# Patient Record
Sex: FEMALE | Race: WHITE | Employment: OTHER | URBAN - METROPOLITAN AREA
[De-identification: names, ages, dates, MRNs, and addresses within clinical notes are randomized per-mention and may not be internally consistent; named-entity substitution may affect disease eponyms.]

---

## 2017-01-04 ENCOUNTER — OFFICE VISIT (OUTPATIENT)
Dept: INTERNAL MEDICINE CLINIC | Age: 62
End: 2017-01-04

## 2017-01-04 VITALS
RESPIRATION RATE: 14 BRPM | HEIGHT: 64 IN | SYSTOLIC BLOOD PRESSURE: 138 MMHG | TEMPERATURE: 98.7 F | HEART RATE: 82 BPM | WEIGHT: 204.4 LBS | OXYGEN SATURATION: 98 % | BODY MASS INDEX: 34.89 KG/M2 | DIASTOLIC BLOOD PRESSURE: 68 MMHG

## 2017-01-04 DIAGNOSIS — Z79.4 TYPE 2 DIABETES MELLITUS WITHOUT COMPLICATION, WITH LONG-TERM CURRENT USE OF INSULIN (HCC): ICD-10-CM

## 2017-01-04 DIAGNOSIS — J18.9 PNEUMONIA, UNSPECIFIED ORGANISM: Primary | ICD-10-CM

## 2017-01-04 DIAGNOSIS — E11.9 TYPE 2 DIABETES MELLITUS WITHOUT COMPLICATION, WITH LONG-TERM CURRENT USE OF INSULIN (HCC): ICD-10-CM

## 2017-01-04 RX ORDER — FLUTICASONE PROPIONATE 44 UG/1
AEROSOL, METERED RESPIRATORY (INHALATION)
COMMUNITY
Start: 2016-12-29 | End: 2018-06-22

## 2017-01-04 RX ORDER — ALBUTEROL SULFATE 90 UG/1
AEROSOL, METERED RESPIRATORY (INHALATION)
COMMUNITY
Start: 2016-12-29 | End: 2018-06-22

## 2017-01-04 RX ORDER — BENZONATATE 200 MG/1
200 CAPSULE ORAL
Qty: 30 CAP | Refills: 0 | Status: SHIPPED | OUTPATIENT
Start: 2017-01-04 | End: 2017-02-01

## 2017-01-04 RX ORDER — HYDROCODONE POLISTIREX AND CHLORPHENIRAMINE POLISTIREX 10; 8 MG/5ML; MG/5ML
1 SUSPENSION, EXTENDED RELEASE ORAL
Qty: 100 ML | Refills: 0 | Status: SHIPPED | OUTPATIENT
Start: 2017-01-04 | End: 2017-02-01

## 2017-01-04 NOTE — PROGRESS NOTES
Pertinent items are noted in HPI. Subjective:   Teresita Segura is a 64 y.o. female who complains she was diagnosed with pneumonia at urgent care 6 days ago and has had symptoms for a total of 8 days. She was started on zpak, albuterol. She is also on flovent. She notes some small amt of clear sputum with her cough. She does feel there has been improvement but seems to be unchanged over the past few days. Notes wheezing, nasal congestion with clear drainage. BS running high in 300 range. She denies a history of chills, fevers and shortness of breath. Evaluation to date: none. Treatment to date: OTC products. Patient does not smoke cigarettes. Relevant PMH: No pertinent additional PMH. Current Outpatient Prescriptions   Medication Sig Dispense Refill    PROAIR HFA 90 mcg/actuation inhaler       FLOVENT HFA 44 mcg/actuation inhaler       valsartan-hydrochlorothiazide (DIOVAN-HCT) 80-12.5 mg per tablet TAKE 1 TABLET DAILY 90 Tab 1    allopurinol (ZYLOPRIM) 300 mg tablet TAKE 1 TABLET DAILY 90 Tab 1    insulin glargine (LANTUS) 100 unit/mL injection 70 Units by SubCUTAneous route nightly.  fenofibrate (LOFIBRA) 54 mg tablet Take  by mouth daily.  escitalopram oxalate (LEXAPRO) 20 mg tablet Take 1 Tab by mouth daily. 30 Tab 5    albiglutide 30 mg/0.5 mL pnij by SubCUTAneous route every seven (7) days.  simvastatin (ZOCOR) 40 mg tablet TAKE 1 TABLET NIGHTLY 90 Tab 1    cyclobenzaprine (FLEXERIL) 10 mg tablet as needed.  terbinafine HCl (LAMISIL) 1 % topical cream Apply  to affected area two (2) times a day. (Patient taking differently: Apply  to affected area two (2) times daily as needed.) 30 g 0    nystatin (MYCOSTATIN) powder Apply  to affected area two (2) times a day. As needed 60 g 1    glucose blood VI test strips (ASCENSIA CONTOUR) strip by Does Not Apply route See Admin Instructions. Boo contour test strips, tests 2-4 times per day. Ordered by Dr. Angel Arevalo.       Cetirizine (ZYRTEC) 10 mg cap Take  by mouth as needed.  glipizide-metformin (METAGLIP) 2.5-500 mg per tablet TAKE 2 TABLETS BEFORE BREAKFAST AND 2 TABLETS BEFORE DINNER 360 tablet 0    HUMALOG 100 unit/mL injection USE AS DIRECTED (Patient taking differently: USE AS DIRECTED; 5 UNITS PER 15 GRAMS CARBS THREE TIMES DAILY) 3 Vial 1    Insulin Syringe-Needle U-100 (BD INSULIN SYRINGE ULT-FINE II) 1 mL 31 x 5/16\" syrg USE 4 TIMES A DAY AS DIRECTED 100 Syringe 5    aspirin 81 mg tablet Take 81 mg by mouth. Allergies   Allergen Reactions    Adhesive Tape Contact Dermatitis     Blistering UNDER STERI-STRIPS    Contrast Agent [Iodine] Hives    Pcn [Penicillins] Hives    Levaquin [Levofloxacin] Nausea and Vomiting    Other Medication Hives and Swelling     CRAB        Review of Systems  Pertinent items are noted in HPI. Objective:     Visit Vitals    /68 (BP 1 Location: Right arm, BP Patient Position: Sitting)    Pulse 82    Temp 98.7 °F (37.1 °C) (Oral)    Resp 14    Ht 5' 4\" (1.626 m)    Wt 204 lb 6.4 oz (92.7 kg)    SpO2 98%    BMI 35.09 kg/m2     General:  alert, cooperative, no distress   Eyes: negative   Ears: normal TM's and external ear canals AU   Sinuses: Mild nasal congestion, sinus nontender. Mouth:  normal findings: oropharynx pink & moist without lesions or evidence of thrush   Neck: supple, symmetrical, trachea midline and no adenopathy. Heart: S1 and S2 normal, no murmurs noted. Lungs: clear to auscultation bilaterally   Abdomen: soft, non-tender. Bowel sounds normal. No masses,  no organomegaly        Assessment/Plan:       ICD-10-CM ICD-9-CM    1. Pneumonia, unspecified organism J18.9 486    2. Type 2 diabetes mellitus without complication, with long-term current use of insulin (HCC)  Not controlled. E11.9 250.00     Z79.4 V58.67    improving and will continue current plan and add tussionex and tessalon for cough. Recheck chest xray in 1 month.  BS running higher with infection. Will increase lantus to 75 units daily until infection resolved and she will make appt with her endocrinologist.  Reviewed her pt first note at visit. Orders Placed This Encounter    PROAIR HFA 90 mcg/actuation inhaler    FLOVENT HFA 44 mcg/actuation inhaler    chlorpheniramine-HYDROcodone (TUSSIONEX) 10-8 mg/5 mL suspension    benzonatate (TESSALON) 200 mg capsule    I have discussed the diagnosis with the patient and the intended plan as seen in the above orders. The patient has received an after-visit summary and questions were answered concerning future plans. I have discussed medication side effects and warnings with the patient as well. She has expressed understanding of the diagnosis and plan    Follow-up Disposition:  Return in about 4 weeks (around 2/1/2017) for follow up.

## 2017-01-18 LAB
CREATININE, EXTERNAL: 0.7
HBA1C MFR BLD HPLC: 10.4 %
LDL-C, EXTERNAL: 91

## 2017-02-01 ENCOUNTER — HOSPITAL ENCOUNTER (OUTPATIENT)
Dept: GENERAL RADIOLOGY | Age: 62
Discharge: HOME | End: 2017-02-01
Attending: INTERNAL MEDICINE
Payer: COMMERCIAL

## 2017-02-01 ENCOUNTER — OFFICE VISIT (OUTPATIENT)
Dept: INTERNAL MEDICINE CLINIC | Age: 62
End: 2017-02-01

## 2017-02-01 VITALS
RESPIRATION RATE: 16 BRPM | HEIGHT: 64 IN | SYSTOLIC BLOOD PRESSURE: 150 MMHG | TEMPERATURE: 98.9 F | HEART RATE: 74 BPM | BODY MASS INDEX: 34.76 KG/M2 | OXYGEN SATURATION: 97 % | WEIGHT: 203.6 LBS | DIASTOLIC BLOOD PRESSURE: 78 MMHG

## 2017-02-01 DIAGNOSIS — J18.9 PNEUMONIA, UNSPECIFIED ORGANISM: ICD-10-CM

## 2017-02-01 DIAGNOSIS — E78.5 HYPERLIPIDEMIA WITH TARGET LDL LESS THAN 100: ICD-10-CM

## 2017-02-01 DIAGNOSIS — I10 ESSENTIAL HYPERTENSION: ICD-10-CM

## 2017-02-01 DIAGNOSIS — F32.5 MAJOR DEPRESSIVE DISORDER WITH SINGLE EPISODE, IN FULL REMISSION (HCC): ICD-10-CM

## 2017-02-01 PROCEDURE — 71020 XR CHEST PA LAT: CPT

## 2017-02-01 NOTE — PROGRESS NOTES
HPI:  Hannah Foote is a 64y.o. year old female who returns to clinic today for follow up: pneumonia, DM, HTN, hypercholesterolemia. She notes cough is resolved and not using proair. Not SOB or wheezing. BS improved with increase in lantus to 75 units daily and Dr Yenny Dc her endocrinologist just added bydureon. -250 which is an improvement. Taking cholesterol medication and BP medication with no side effects. Brings recent lab work and LDL at goal.   Current Outpatient Prescriptions   Medication Sig Dispense Refill    FLOVENT HFA 44 mcg/actuation inhaler       valsartan-hydrochlorothiazide (DIOVAN-HCT) 80-12.5 mg per tablet TAKE 1 TABLET DAILY 90 Tab 1    allopurinol (ZYLOPRIM) 300 mg tablet TAKE 1 TABLET DAILY 90 Tab 1    insulin glargine (LANTUS) 100 unit/mL injection 75 Units by SubCUTAneous route nightly.  fenofibrate (LOFIBRA) 54 mg tablet Take  by mouth daily.  escitalopram oxalate (LEXAPRO) 20 mg tablet Take 1 Tab by mouth daily. 30 Tab 5    simvastatin (ZOCOR) 40 mg tablet TAKE 1 TABLET NIGHTLY 90 Tab 1    cyclobenzaprine (FLEXERIL) 10 mg tablet as needed.  terbinafine HCl (LAMISIL) 1 % topical cream Apply  to affected area two (2) times a day. (Patient taking differently: Apply  to affected area two (2) times daily as needed.) 30 g 0    nystatin (MYCOSTATIN) powder Apply  to affected area two (2) times a day. As needed 60 g 1    glucose blood VI test strips (ASCENSIA CONTOUR) strip by Does Not Apply route See Admin Instructions. Boo contour test strips, tests 2-4 times per day. Ordered by Dr. Yenny Dc.       glipizide-metformin (METAGLIP) 2.5-500 mg per tablet TAKE 2 TABLETS BEFORE BREAKFAST AND 2 TABLETS BEFORE DINNER 360 tablet 0    HUMALOG 100 unit/mL injection USE AS DIRECTED (Patient taking differently: USE AS DIRECTED; 5 UNITS PER 15 GRAMS CARBS THREE TIMES DAILY) 3 Vial 1    Insulin Syringe-Needle U-100 (BD INSULIN SYRINGE ULT-FINE II) 1 mL 31 x 5/16\" syrg USE 4 TIMES A DAY AS DIRECTED 100 Syringe 5    aspirin 81 mg tablet Take 81 mg by mouth.  PROAIR HFA 90 mcg/actuation inhaler        Allergies   Allergen Reactions    Adhesive Tape Contact Dermatitis     Blistering UNDER STERI-STRIPS    Contrast Agent [Iodine] Hives    Pcn [Penicillins] Hives    Levaquin [Levofloxacin] Nausea and Vomiting    Other Medication Hives and Swelling     CRAB     Social History   Substance Use Topics    Smoking status: Never Smoker    Smokeless tobacco: Never Used    Alcohol use No         Review of Systems   Constitutional: Negative for malaise/fatigue. Respiratory: Negative for shortness of breath. Cardiovascular: Negative for chest pain and leg swelling. Gastrointestinal: Negative for abdominal pain and heartburn. Neurological: Negative for dizziness and headaches. Physical Exam   Constitutional: She appears well-developed. No distress. /78 (BP 1 Location: Right arm, BP Patient Position: Sitting)  Pulse 74  Temp 98.9 °F (37.2 °C)  Resp 16  Ht 5' 4\" (1.626 m)  Wt 203 lb 9.6 oz (92.4 kg)  SpO2 97%  BMI 34.95 kg/m2   Cardiovascular: Normal rate, regular rhythm, normal heart sounds and intact distal pulses. No murmur heard. Pulmonary/Chest: Effort normal and breath sounds normal. She has no wheezes. Abdominal: Soft. Bowel sounds are normal. She exhibits no distension and no mass. There is no tenderness. Musculoskeletal: She exhibits no edema. Psychiatric: She has a normal mood and affect. Vitals reviewed. Assessment & Plan:    ICD-10-CM ICD-9-CM    1. Uncontrolled type 2 diabetes mellitus without complication, with long-term current use of insulin ([address]  BS improved with recent changes in medication by her endocrine specialist. Reinforced importance of taking medication and following diabetic diet. E11.65 250.02     Z79.4 V58.67    2. Essential hypertension  Continue current medication. I10 401.9    3.  Major depressive disorder with single episode, in full remission ([email or web address] F32.5 296.26    4. Hyperlipidemia with target LDL less than 100 E78.5 272.4    5. Pneumonia, unspecified organism  Clinically resolved. Recheck chest xray. J18.9 486 XR CHEST PA LAT        Follow-up Disposition:  Return in about 6 months (around 8/1/2017) for follow up. Advised her to call back or return to office if symptoms worsen/change/persist.  Discussed expected course/resolution/complications of diagnosis in detail with patient. Medication risks/benefits/costs/interactions/alternatives discussed with patient. She was given an after visit summary which includes diagnoses, current medications, & vitals. She expressed understanding with the diagnosis and plan.

## 2017-02-03 ENCOUNTER — TELEPHONE (OUTPATIENT)
Dept: INTERNAL MEDICINE CLINIC | Age: 62
End: 2017-02-03

## 2017-03-17 RX ORDER — SIMVASTATIN 40 MG/1
TABLET, FILM COATED ORAL
Qty: 90 TAB | Refills: 1 | Status: SHIPPED | OUTPATIENT
Start: 2017-03-17 | End: 2017-12-31 | Stop reason: SDUPTHER

## 2017-08-08 LAB — HBA1C MFR BLD HPLC: 11.3 %

## 2017-08-15 ENCOUNTER — OFFICE VISIT (OUTPATIENT)
Dept: INTERNAL MEDICINE CLINIC | Age: 62
End: 2017-08-15

## 2017-08-15 VITALS
TEMPERATURE: 98.9 F | SYSTOLIC BLOOD PRESSURE: 102 MMHG | HEART RATE: 72 BPM | BODY MASS INDEX: 34.97 KG/M2 | OXYGEN SATURATION: 97 % | WEIGHT: 204.8 LBS | RESPIRATION RATE: 16 BRPM | DIASTOLIC BLOOD PRESSURE: 64 MMHG | HEIGHT: 64 IN

## 2017-08-15 DIAGNOSIS — Z12.31 VISIT FOR SCREENING MAMMOGRAM: ICD-10-CM

## 2017-08-15 DIAGNOSIS — E78.5 HYPERLIPIDEMIA WITH TARGET LDL LESS THAN 100: ICD-10-CM

## 2017-08-15 DIAGNOSIS — I10 ESSENTIAL HYPERTENSION: ICD-10-CM

## 2017-08-15 DIAGNOSIS — F32.5 MAJOR DEPRESSIVE DISORDER WITH SINGLE EPISODE, IN FULL REMISSION (HCC): ICD-10-CM

## 2017-08-15 RX ORDER — EXENATIDE 2 MG/.65ML
2 INJECTION, SUSPENSION, EXTENDED RELEASE SUBCUTANEOUS
Refills: 3 | COMMUNITY
Start: 2017-06-26 | End: 2020-05-08

## 2017-08-15 RX ORDER — IBUPROFEN 200 MG
TABLET ORAL
COMMUNITY
End: 2021-06-24

## 2017-08-15 NOTE — PROGRESS NOTES
HPI:  Kala Cardenas is a 58y.o. year old female who returns to clinic today for follow up: AODM, HTN, hypercholesterolemia. 1. Endocrine Review  Since last visit: she has seen diabetic teaching for dietary changes. Home glucose monitorin-200. She reports medication compliance: compliant all of the time. She reports the following medication side effects: none. Diabetic diet compliance: compliant most of the time. 2.Cardiovascular: She reports taking medications as instructed, no medication side effects noted, The home BP readings outside of office: not checing. Diet and Lifestyle: generally follows a low fat low cholesterol diet, generally follows a low sodium diet. Exercise: going to the gym 1 x a week and doing cardio and back strengthening exercise. 3. Depression:  Treatment includes Lexapro and no other therapies. Ongoing symptoms include none. She denies depressed mood, anhedonia and No suicidal ideation. .   She experiences the following side effects from the treatment: none. Current Outpatient Prescriptions   Medication Sig Dispense Refill    ibuprofen (ADVIL) 200 mg tablet Take  by mouth.  valsartan-hydroCHLOROthiazide (DIOVAN-HCT) 80-12.5 mg per tablet TAKE 1 TABLET DAILY 90 Tab 0    allopurinol (ZYLOPRIM) 300 mg tablet TAKE 1 TABLET DAILY 90 Tab 0    escitalopram oxalate (LEXAPRO) 20 mg tablet TAKE 1 TAB BY MOUTH DAILY. 90 Tab 0    simvastatin (ZOCOR) 40 mg tablet TAKE 1 TABLET BY MOUTH AT BEDTIME 90 Tab 1    PROAIR HFA 90 mcg/actuation inhaler       FLOVENT HFA 44 mcg/actuation inhaler       insulin glargine (LANTUS) 100 unit/mL injection 75 Units by SubCUTAneous route nightly.  fenofibrate (LOFIBRA) 54 mg tablet Take  by mouth daily.  terbinafine HCl (LAMISIL) 1 % topical cream Apply  to affected area two (2) times a day.  (Patient taking differently: Apply  to affected area two (2) times daily as needed.) 30 g 0    nystatin (MYCOSTATIN) powder Apply  to affected area two (2) times a day. As needed 60 g 1    glucose blood VI test strips (ASCENSIA CONTOUR) strip by Does Not Apply route See Admin Instructions. Boo contour test strips, tests 2-4 times per day. Ordered by Dr. Safia Huynh.  glipizide-metformin (METAGLIP) 2.5-500 mg per tablet TAKE 2 TABLETS BEFORE BREAKFAST AND 2 TABLETS BEFORE DINNER 360 tablet 0    HUMALOG 100 unit/mL injection USE AS DIRECTED (Patient taking differently: USE AS DIRECTED; 5 UNITS PER 15 GRAMS CARBS THREE TIMES DAILY) 3 Vial 1    Insulin Syringe-Needle U-100 (BD INSULIN SYRINGE ULT-FINE II) 1 mL 31 x 5/16\" syrg USE 4 TIMES A DAY AS DIRECTED 100 Syringe 5    aspirin 81 mg tablet Take 81 mg by mouth.  BYDUREON 2 mg/0.65 mL pnij 2 mg by SubCUTAneous route every seven (7) days. 3    cyclobenzaprine (FLEXERIL) 10 mg tablet as needed. Allergies   Allergen Reactions    Adhesive Tape Contact Dermatitis     Blistering UNDER STERI-STRIPS    Contrast Agent [Iodine] Hives    Pcn [Penicillins] Hives    Levaquin [Levofloxacin] Nausea and Vomiting    Other Medication Hives and Swelling     CRAB     Social History   Substance Use Topics    Smoking status: Never Smoker    Smokeless tobacco: Never Used    Alcohol use No         Review of Systems   Constitutional: Positive for malaise/fatigue. Respiratory: Negative for shortness of breath. Cardiovascular: Negative for chest pain and leg swelling. Gastrointestinal: Positive for abdominal pain (has pulling sensation at site of mesh in mid abdomen where had hernia repair. ). Negative for heartburn. Neurological: Negative for dizziness and headaches. Psychiatric/Behavioral: The patient has insomnia ( wakes her up at 3 am to go to work. ). Physical Exam   Constitutional: She appears well-developed. No distress.    /64  Pulse 72  Temp 98.9 °F (37.2 °C) (Oral)   Resp 16  Ht 5' 4\" (1.626 m)  Wt 204 lb 12.8 oz (92.9 kg)  SpO2 97%  BMI 35.15 kg/m2   Neck: Carotid bruit is not present. Cardiovascular: Normal rate, regular rhythm, normal heart sounds and intact distal pulses. No murmur heard. Pulmonary/Chest: Effort normal and breath sounds normal. She has no wheezes. Abdominal: Soft. Bowel sounds are normal. She exhibits no distension and no mass. There is tenderness (mildly tender over mid line incision which is well healed. ). Musculoskeletal: She exhibits no edema. Psychiatric: She has a normal mood and affect. Vitals reviewed. foot exam: Monofilament intact bilaterally. Pulses 2+ bilaterally. No skin lesions or open wounds. Assessment & Plan:    ICD-10-CM ICD-9-CM    1. Uncontrolled type 2 diabetes mellitus without complication, with long-term current use of insulin ([address]  Not at goal. Working endocrinologist. [facility] lab in 3 weeks at her next visit Dr Wild Ramos. E11.65 250.02  DIABETES FOOT EXAM    Z79.4 V58.67    2. Hyperlipidemia with target LDL less than 100  Well controlled, continue current medication. E78.5 272.4    3. Essential hypertension  Well controlled, continue current medication. I10 401.9    4. Major depressive disorder with single episode, in full remission ([address]  Well controlled, continue current medication. F32.5 296.26       Orders Placed This Encounter    EDITH MAMMO BI SCREENING INCL CAD    AMB EXT HGBA1C    MICROALBUMIN, UR, RAND W/ MICROALBUMIN/CREA RATIO     DIABETES FOOT EXAM     Follow-up Disposition:  Return in about 6 months (around 2/15/2018) for follow up. Advised her to call back or return to office if symptoms worsen/change/persist.  Discussed expected course/resolution/complications of diagnosis in detail with patient. Medication risks/benefits/costs/interactions/alternatives discussed with patient. She was given an after visit summary which includes diagnoses, current medications, & vitals. She expressed understanding with the diagnosis and plan.

## 2017-08-15 NOTE — PROGRESS NOTES
Chief Complaint   Patient presents with    Medication Management     1. Have you been to the ER, urgent care clinic since your last visit? Hospitalized since your last visit? No    2. Have you seen or consulted any other health care providers outside of the [address] [place] [address] since your last visit? Include any pap smears or colon screening.  No

## 2017-08-16 LAB
ALBUMIN/CREAT UR: 5.6 MG/G CREAT (ref 0–30)
CREAT UR-MCNC: 91.5 MG/DL
MICROALBUMIN UR-MCNC: 5.1 UG/ML

## 2017-10-10 ENCOUNTER — TELEPHONE (OUTPATIENT)
Dept: INTERNAL MEDICINE CLINIC | Age: 62
End: 2017-10-10

## 2017-10-10 NOTE — TELEPHONE ENCOUNTER
Pt wants to know if information was faxed to [address] [place] [address]  about her being a diabetic?

## 2017-10-19 NOTE — TELEPHONE ENCOUNTER
Called pt and left a message informing her that we do not have the [employer] [facility] form and can pt send us another form.

## 2017-10-25 ENCOUNTER — TELEPHONE (OUTPATIENT)
Dept: INTERNAL MEDICINE CLINIC | Age: 62
End: 2017-10-25

## 2017-12-31 ENCOUNTER — TELEPHONE (OUTPATIENT)
Dept: INTERNAL MEDICINE CLINIC | Age: 62
End: 2017-12-31

## 2018-01-02 RX ORDER — SIMVASTATIN 40 MG/1
TABLET, FILM COATED ORAL
Qty: 90 TAB | Refills: 0 | Status: SHIPPED | OUTPATIENT
Start: 2018-01-02 | End: 2018-08-09 | Stop reason: SDUPTHER

## 2018-01-06 LAB
HBA1C MFR BLD HPLC: 11.3 %
LDL-C, EXTERNAL: 81
MICROALBUMIN UR TEST STR-MCNC: 58 MG/DL

## 2018-03-11 RX ORDER — ALLOPURINOL 300 MG/1
TABLET ORAL
Qty: 90 TAB | Refills: 0 | Status: SHIPPED | OUTPATIENT
Start: 2018-03-11 | End: 2018-03-30 | Stop reason: SDUPTHER

## 2018-03-21 ENCOUNTER — TELEPHONE (OUTPATIENT)
Dept: INTERNAL MEDICINE CLINIC | Age: 63
End: 2018-03-21

## 2018-03-21 DIAGNOSIS — N20.0 NEPHROLITHIASIS, URIC ACID: ICD-10-CM

## 2018-03-21 DIAGNOSIS — E11.9 TYPE 2 DIABETES MELLITUS WITHOUT COMPLICATION, WITHOUT LONG-TERM CURRENT USE OF INSULIN (HCC): Primary | ICD-10-CM

## 2018-03-21 DIAGNOSIS — E78.5 HYPERLIPIDEMIA WITH TARGET LDL LESS THAN 100: ICD-10-CM

## 2018-03-21 DIAGNOSIS — I10 ESSENTIAL HYPERTENSION: ICD-10-CM

## 2018-03-21 NOTE — TELEPHONE ENCOUNTER
[phone] pt would like to come in and labs done this Friday for her appt next Friday. Pleas be sure labs order is in the computer for her.

## 2018-03-24 LAB
ALBUMIN SERPL-MCNC: 4.3 G/DL (ref 3.6–4.8)
ALBUMIN/CREAT UR: 15.4 MG/G CREAT (ref 0–30)
ALBUMIN/GLOB SERPL: 1.8 {RATIO} (ref 1.2–2.2)
ALP SERPL-CCNC: 64 IU/L (ref 39–117)
ALT SERPL-CCNC: 22 IU/L (ref 0–32)
AST SERPL-CCNC: 22 IU/L (ref 0–40)
BASOPHILS # BLD AUTO: 0 X10E3/UL (ref 0–0.2)
BASOPHILS NFR BLD AUTO: 0 %
BILIRUB SERPL-MCNC: 0.3 MG/DL (ref 0–1.2)
BUN SERPL-MCNC: 21 MG/DL (ref 8–27)
BUN/CREAT SERPL: 27 (ref 12–28)
CALCIUM SERPL-MCNC: 9.2 MG/DL (ref 8.7–10.3)
CHLORIDE SERPL-SCNC: 98 MMOL/L (ref 96–106)
CHOLEST SERPL-MCNC: 180 MG/DL (ref 100–199)
CO2 SERPL-SCNC: 23 MMOL/L (ref 18–29)
CREAT SERPL-MCNC: 0.78 MG/DL (ref 0.57–1)
CREAT UR-MCNC: 86.5 MG/DL
EOSINOPHIL # BLD AUTO: 0.2 X10E3/UL (ref 0–0.4)
EOSINOPHIL NFR BLD AUTO: 3 %
ERYTHROCYTE [DISTWIDTH] IN BLOOD BY AUTOMATED COUNT: 15.4 % (ref 12.3–15.4)
EST. AVERAGE GLUCOSE BLD GHB EST-MCNC: 275 MG/DL
GFR SERPLBLD CREATININE-BSD FMLA CKD-EPI: 82 ML/MIN/1.73
GFR SERPLBLD CREATININE-BSD FMLA CKD-EPI: 94 ML/MIN/1.73
GLOBULIN SER CALC-MCNC: 2.4 G/DL (ref 1.5–4.5)
GLUCOSE SERPL-MCNC: 142 MG/DL (ref 65–99)
HBA1C MFR BLD: 11.2 % (ref 4.8–5.6)
HCT VFR BLD AUTO: 39.9 % (ref 34–46.6)
HDLC SERPL-MCNC: 75 MG/DL
HGB BLD-MCNC: 12.9 G/DL (ref 11.1–15.9)
IMM GRANULOCYTES # BLD: 0 X10E3/UL (ref 0–0.1)
IMM GRANULOCYTES NFR BLD: 0 %
LDLC SERPL CALC-MCNC: 84 MG/DL (ref 0–99)
LYMPHOCYTES # BLD AUTO: 2.5 X10E3/UL (ref 0.7–3.1)
LYMPHOCYTES NFR BLD AUTO: 34 %
MCH RBC QN AUTO: 27.4 PG (ref 26.6–33)
MCHC RBC AUTO-ENTMCNC: 32.3 G/DL (ref 31.5–35.7)
MCV RBC AUTO: 85 FL (ref 79–97)
MICROALBUMIN UR-MCNC: 13.3 UG/ML
MONOCYTES # BLD AUTO: 0.4 X10E3/UL (ref 0.1–0.9)
MONOCYTES NFR BLD AUTO: 5 %
NEUTROPHILS # BLD AUTO: 4.4 X10E3/UL (ref 1.4–7)
NEUTROPHILS NFR BLD AUTO: 58 %
PLATELET # BLD AUTO: 291 X10E3/UL (ref 150–379)
POTASSIUM SERPL-SCNC: 4.5 MMOL/L (ref 3.5–5.2)
PROT SERPL-MCNC: 6.7 G/DL (ref 6–8.5)
RBC # BLD AUTO: 4.7 X10E6/UL (ref 3.77–5.28)
SODIUM SERPL-SCNC: 139 MMOL/L (ref 134–144)
TRIGL SERPL-MCNC: 107 MG/DL (ref 0–149)
URATE SERPL-MCNC: 4.4 MG/DL (ref 2.5–7.1)
VLDLC SERPL CALC-MCNC: 21 MG/DL (ref 5–40)
WBC # BLD AUTO: 7.6 X10E3/UL (ref 3.4–10.8)

## 2018-03-30 ENCOUNTER — OFFICE VISIT (OUTPATIENT)
Dept: INTERNAL MEDICINE CLINIC | Age: 63
End: 2018-03-30

## 2018-03-30 VITALS
RESPIRATION RATE: 16 BRPM | SYSTOLIC BLOOD PRESSURE: 122 MMHG | DIASTOLIC BLOOD PRESSURE: 60 MMHG | OXYGEN SATURATION: 95 % | TEMPERATURE: 98 F | BODY MASS INDEX: 34.69 KG/M2 | HEIGHT: 64 IN | WEIGHT: 203.2 LBS | HEART RATE: 94 BPM

## 2018-03-30 DIAGNOSIS — E66.9 OBESITY (BMI 30-39.9): ICD-10-CM

## 2018-03-30 DIAGNOSIS — E78.5 HYPERLIPIDEMIA WITH TARGET LDL LESS THAN 100: ICD-10-CM

## 2018-03-30 DIAGNOSIS — I10 ESSENTIAL HYPERTENSION: ICD-10-CM

## 2018-03-30 DIAGNOSIS — E11.9 TYPE 2 DIABETES MELLITUS WITHOUT COMPLICATION, WITHOUT LONG-TERM CURRENT USE OF INSULIN (HCC): Primary | ICD-10-CM

## 2018-03-30 RX ORDER — ESCITALOPRAM OXALATE 20 MG/1
TABLET ORAL
Qty: 30 TAB | Refills: 5 | Status: SHIPPED | OUTPATIENT
Start: 2018-03-30 | End: 2019-03-09 | Stop reason: SDUPTHER

## 2018-03-30 RX ORDER — NAPROXEN SODIUM 220 MG
220 TABLET ORAL AS NEEDED
COMMUNITY
End: 2021-06-24

## 2018-03-30 RX ORDER — ALLOPURINOL 300 MG/1
TABLET ORAL
Qty: 30 TAB | Refills: 5 | Status: SHIPPED | OUTPATIENT
Start: 2018-03-30 | End: 2019-03-14 | Stop reason: SDUPTHER

## 2018-03-30 RX ORDER — INSULIN DEGLUDEC INJECTION 200 U/ML
80 INJECTION, SOLUTION SUBCUTANEOUS DAILY
Refills: 6 | COMMUNITY
Start: 2018-01-19

## 2018-03-30 RX ORDER — VALSARTAN AND HYDROCHLOROTHIAZIDE 80; 12.5 MG/1; MG/1
TABLET, FILM COATED ORAL
Qty: 30 TAB | Refills: 5 | Status: SHIPPED | OUTPATIENT
Start: 2018-03-30 | End: 2019-02-21 | Stop reason: SDUPTHER

## 2018-03-30 RX ORDER — INSULIN DEGLUDEC 100 U/ML
INJECTION, SOLUTION SUBCUTANEOUS
COMMUNITY
End: 2018-03-30 | Stop reason: CLARIF

## 2018-03-30 NOTE — PROGRESS NOTES
HPI:  Hina Salgado is a 58y.o. year old female who returns to clinic today for follow up:   AODM, HTN, hypercholesterolemia. 1. Endocrine Review  Since last visit: followed by Dr Shannan Garcia glucose monitorin-240   She reports medication compliance: compliant some of the time. She reports the following medication side effects: none. Diabetic diet compliance: compliant most of the time. 2.Cardiovascular: She reports taking medications as instructed, no medication side effects noted, The home BP readings outside of office: not checing. Diet and Lifestyle: generally follows a low fat low cholesterol diet, generally follows a low sodium diet. Exercise: going to the gym 3 x a week water aerobics. 3. Depression:  Treatment includes Lexapro and no other therapies. Ongoing symptoms include none. She denies depressed mood, anhedonia and No suicidal ideation. .   She experiences the following side effects from the treatment: none. Current Outpatient Prescriptions   Medication Sig Dispense Refill    naproxen sodium (ALEVE) 220 mg tablet Take 220 mg by mouth two (2) times daily (with meals).  TRESIBA FLEXTOUCH U-200 200 unit/mL (3 mL) inpn INJECT 75 UNITS DAILY  6    valsartan-hydroCHLOROthiazide (DIOVAN-HCT) 80-12.5 mg per tablet TAKE 1 TABLET BY MOUTH EVERY DAY 30 Tab 0    escitalopram oxalate (LEXAPRO) 20 mg tablet TAKE 1 TABLET BY MOUTH DAILY 30 Tab 0    allopurinol (ZYLOPRIM) 300 mg tablet TAKE 1 TABLET BY MOUTH EVERY DAY 90 Tab 0    simvastatin (ZOCOR) 40 mg tablet TAKE 1 TABLET BY MOUTH AT BEDTIME 90 Tab 0    NYSTOP powder APPLY TO AFFECTED AREA TWO (2) TIMES A DAY. AS NEEDED 60 g 1    BYDUREON 2 mg/0.65 mL pnij 2 mg by SubCUTAneous route every seven (7) days. 3    fenofibrate (LOFIBRA) 54 mg tablet Take  by mouth daily.  cyclobenzaprine (FLEXERIL) 10 mg tablet as needed.  terbinafine HCl (LAMISIL) 1 % topical cream Apply  to affected area two (2) times a day.  (Patient taking differently: Apply  to affected area two (2) times daily as needed.) 30 g 0    glucose blood VI test strips (ASCENSIA CONTOUR) strip by Does Not Apply route See Admin Instructions. Boo contour test strips, tests 2-4 times per day. Ordered by Dr. Meenakshi Esparza.  glipizide-metformin (METAGLIP) 2.5-500 mg per tablet TAKE 2 TABLETS BEFORE BREAKFAST AND 2 TABLETS BEFORE DINNER 360 tablet 0    HUMALOG 100 unit/mL injection USE AS DIRECTED (Patient taking differently: USE AS DIRECTED; 5 UNITS PER 15 GRAMS CARBS THREE TIMES DAILY) 3 Vial 1    Insulin Syringe-Needle U-100 (BD INSULIN SYRINGE ULT-FINE II) 1 mL 31 x 5/16\" syrg USE 4 TIMES A DAY AS DIRECTED 100 Syringe 5    aspirin 81 mg tablet Take 81 mg by mouth.  ibuprofen (ADVIL) 200 mg tablet Take  by mouth.  PROAIR HFA 90 mcg/actuation inhaler       FLOVENT HFA 44 mcg/actuation inhaler       insulin glargine (LANTUS) 100 unit/mL injection 75 Units by SubCUTAneous route nightly. Allergies   Allergen Reactions    Adhesive Tape Contact Dermatitis     Blistering UNDER STERI-STRIPS    Contrast Agent [Iodine] Hives    Pcn [Penicillins] Hives    Levaquin [Levofloxacin] Nausea and Vomiting    Other Medication Hives and Swelling     CRAB     Social History   Substance Use Topics    Smoking status: Never Smoker    Smokeless tobacco: Never Used    Alcohol use No         Review of Systems   Constitutional: Negative for malaise/fatigue. Respiratory: Negative for shortness of breath. Cardiovascular: Negative for chest pain and leg swelling. Gastrointestinal: Negative for abdominal pain and heartburn. Neurological: Negative for dizziness and headaches. Physical Exam   Constitutional: She appears well-developed. No distress. /60  Pulse 94  Temp 98 °F (36.7 °C)  Resp 16  Ht 5' 4\" (1.626 m)  Wt 203 lb 3.2 oz (92.2 kg)  SpO2 95%  BMI 34.88 kg/m2   Neck: Carotid bruit is not present.    Cardiovascular: Normal rate, regular rhythm, normal heart sounds and intact distal pulses. No murmur heard. Pulmonary/Chest: Effort normal and breath sounds normal. She has no wheezes. Abdominal: Soft. Bowel sounds are normal. She exhibits no distension and no mass. There is no tenderness. Musculoskeletal: She exhibits no edema. Psychiatric: She has a normal mood and affect. Vitals reviewed. Assessment & Plan:    ICD-10-CM ICD-9-CM    1. Type 2 diabetes mellitus without complication, without long-term current use of insulin ([address]  Check lab work and continue with current medication. She is followed by endocrine. A1c has not been at goal but this is primarily due to patient's noncompliance at times with her medications and poor diet. She states her understanding of importance of lifestyle changes. E11.9 250.00    2. Hyperlipidemia with target LDL less than 100  At goal continue current medication. E78.5 272.4    3. Essential hypertension  At goal continue current medication. I10 401.9 AMB POC EKG ROUTINE W/ 12 LEADS, INTER & REP   4. Obesity (BMI 30-39. 9)  Counseled regarding elevated BMI and current weight goals. Reviewed weight loss strategies including dietary changes and exercise. E66.9 278.00    5. Depression: Well-controlled continue with current medication. Orders Placed This Encounter    AMB POC EKG ROUTINE W/ 12 LEADS, INTER & REP    DISCONTD: insulin degludec (TRESIBA FLEXTOUCH U-100) 100 unit/mL (3 mL) inpn    naproxen sodium (ALEVE) 220 mg tablet    TRESIBA FLEXTOUCH U-200 200 unit/mL (3 mL) inpn    allopurinol (ZYLOPRIM) 300 mg tablet    escitalopram oxalate (LEXAPRO) 20 mg tablet    valsartan-hydroCHLOROthiazide (DIOVAN-HCT) 80-12.5 mg per tablet        Follow-up Disposition:  Return in about 3 months (around 6/30/2018). Advised her to call back or return to office if symptoms worsen/change/persist.  Discussed expected course/resolution/complications of diagnosis in detail with patient.     Medication risks/benefits/costs/interactions/alternatives discussed with patient. She was given an after visit summary which includes diagnoses, current medications, & vitals. She expressed understanding with the diagnosis and plan.

## 2018-03-30 NOTE — PROGRESS NOTES
Chief Complaint   Patient presents with    Hypertension    Diabetes    Other     discuss medication    Results     most recent labs    Medication Refill     Patient states she is here for routine follow up and to discuss most recent lab results. Patient also states she is needing refill of medications. Patient states she is gets a lot of gas after she eats anything.

## 2018-06-22 ENCOUNTER — OFFICE VISIT (OUTPATIENT)
Dept: INTERNAL MEDICINE CLINIC | Age: 63
End: 2018-06-22

## 2018-06-22 ENCOUNTER — HOSPITAL ENCOUNTER (OUTPATIENT)
Dept: GENERAL RADIOLOGY | Age: 63
Discharge: HOME | End: 2018-06-22
Payer: COMMERCIAL

## 2018-06-22 VITALS
SYSTOLIC BLOOD PRESSURE: 136 MMHG | BODY MASS INDEX: 35.1 KG/M2 | RESPIRATION RATE: 16 BRPM | HEIGHT: 64 IN | DIASTOLIC BLOOD PRESSURE: 62 MMHG | OXYGEN SATURATION: 94 % | TEMPERATURE: 98.1 F | HEART RATE: 78 BPM | WEIGHT: 205.6 LBS

## 2018-06-22 DIAGNOSIS — G89.29 CHRONIC MIDLINE LOW BACK PAIN WITHOUT SCIATICA: ICD-10-CM

## 2018-06-22 DIAGNOSIS — M54.50 CHRONIC MIDLINE LOW BACK PAIN WITHOUT SCIATICA: ICD-10-CM

## 2018-06-22 DIAGNOSIS — G89.29 CHRONIC MIDLINE LOW BACK PAIN WITHOUT SCIATICA: Primary | ICD-10-CM

## 2018-06-22 DIAGNOSIS — M54.50 CHRONIC MIDLINE LOW BACK PAIN WITHOUT SCIATICA: Primary | ICD-10-CM

## 2018-06-22 PROBLEM — E66.01 SEVERE OBESITY (BMI 35.0-39.9): Status: ACTIVE | Noted: 2018-06-22

## 2018-06-22 PROCEDURE — 72072 X-RAY EXAM THORAC SPINE 3VWS: CPT

## 2018-06-22 PROCEDURE — 72100 X-RAY EXAM L-S SPINE 2/3 VWS: CPT

## 2018-06-22 RX ORDER — METFORMIN HYDROCHLORIDE 500 MG/1
1000 TABLET, EXTENDED RELEASE ORAL 2 TIMES DAILY
Refills: 6 | COMMUNITY
Start: 2018-05-18

## 2018-06-22 RX ORDER — GLIMEPIRIDE 4 MG/1
4 TABLET ORAL 2 TIMES DAILY
Refills: 6 | COMMUNITY
Start: 2018-05-18

## 2018-06-22 RX ORDER — INSULIN LISPRO 100 [IU]/ML
INJECTION, SOLUTION INTRAVENOUS; SUBCUTANEOUS
Refills: 2 | COMMUNITY
Start: 2018-05-23

## 2018-06-22 RX ORDER — PEN NEEDLE, DIABETIC 32GX 5/32"
NEEDLE, DISPOSABLE MISCELLANEOUS
Refills: 3 | COMMUNITY
Start: 2018-04-04

## 2018-06-22 NOTE — PROGRESS NOTES
HPI:  Levi Thomas is a 61y.o. year old female who returns to clinic today for an acute visit: low back pain middle to right side of back. No radiation down her leg and no sensory or motor loss. If she stands and tries to walk the pain is severe but if sits resolves. She has been seen by a chiropractor. Has tried massage, manipulations, dry needling, water aerobic and gentle strength training. Current Outpatient Prescriptions   Medication Sig Dispense Refill    glimepiride (AMARYL) 4 mg tablet TAKE 1 TABLET WITH BREAKFAST OR THE FIRST MAIN MEAL OF THE DAY ONCE A DAY  6    metFORMIN ER (GLUCOPHAGE XR) 500 mg tablet TAKE 4 TABLETS WITH BREAKFAST ONCE A DAY  6    HUMALOG KWIKPEN INSULIN 100 unit/mL kwikpen INJECT 5 UNITS PER 15 GRAMS OF CARBS UP  UNITS PER DAY SUBCUTANEOUSLY  2    NOHEMY PEN NEEDLE 32 gauge x 5/32\" ndle USE AS DIRECTED FOR INSULIN INJECTIONS 4 TIMES A DAY  3    naproxen sodium (ALEVE) 220 mg tablet Take 220 mg by mouth two (2) times daily (with meals).  TRESIBA FLEXTOUCH U-200 200 unit/mL (3 mL) inpn INJECT 75 UNITS DAILY  6    allopurinol (ZYLOPRIM) 300 mg tablet TAKE 1 TABLET BY MOUTH EVERY DAY 30 Tab 5    escitalopram oxalate (LEXAPRO) 20 mg tablet TAKE 1 TABLET BY MOUTH DAILY 30 Tab 5    valsartan-hydroCHLOROthiazide (DIOVAN-HCT) 80-12.5 mg per tablet TAKE 1 TABLET BY MOUTH EVERY DAY 30 Tab 5    simvastatin (ZOCOR) 40 mg tablet TAKE 1 TABLET BY MOUTH AT BEDTIME 90 Tab 0    NYSTOP powder APPLY TO AFFECTED AREA TWO (2) TIMES A DAY. AS NEEDED 60 g 1    BYDUREON 2 mg/0.65 mL pnij 2 mg by SubCUTAneous route every seven (7) days. 3    ibuprofen (ADVIL) 200 mg tablet Take  by mouth.  insulin glargine (LANTUS) 100 unit/mL injection 75 Units by SubCUTAneous route nightly.  fenofibrate (LOFIBRA) 54 mg tablet Take  by mouth daily.  terbinafine HCl (LAMISIL) 1 % topical cream Apply  to affected area two (2) times a day.  (Patient taking differently: Apply  to affected area two (2) times daily as needed.) 30 g 0    glucose blood VI test strips (ASCENSIA CONTOUR) strip by Does Not Apply route See Admin Instructions. Boo contour test strips, tests 2-4 times per day. Ordered by Dr. Mya Macias.  Insulin Syringe-Needle U-100 (BD INSULIN SYRINGE ULT-FINE II) 1 mL 31 x 5/16\" syrg USE 4 TIMES A DAY AS DIRECTED 100 Syringe 5    aspirin 81 mg tablet Take 81 mg by mouth.  cyclobenzaprine (FLEXERIL) 10 mg tablet as needed. Allergies   Allergen Reactions    Adhesive Tape Contact Dermatitis     Blistering UNDER STERI-STRIPS    Contrast Agent [Iodine] Hives    Pcn [Penicillins] Hives    Levaquin [Levofloxacin] Nausea and Vomiting    Other Medication Hives and Swelling     CRAB     Social History   Substance Use Topics    Smoking status: Never Smoker    Smokeless tobacco: Never Used    Alcohol use No         Review of Systems   Constitutional: Negative for fever. Respiratory: Negative for shortness of breath. Cardiovascular: Negative for chest pain. Gastrointestinal: Negative for abdominal pain. Neurological: Negative for sensory change and focal weakness. Physical Exam   Constitutional: She appears well-developed. No distress. /62  Pulse 78  Temp 98.1 °F (36.7 °C) (Oral)   Resp 16  Ht 5' 4\" (1.626 m)  Wt 205 lb 9.6 oz (93.3 kg)  SpO2 94%  BMI 35.29 kg/m2   Cardiovascular: Intact distal pulses. Abdominal: Soft. Bowel sounds are normal. There is no tenderness. Musculoskeletal: She exhibits no edema. Back exam: antalgic gait, limited range of motion, pain with motion noted during exam, tenderness noted mid to upper lumbar and paralumbar musculature bilaterally, negative straight-leg raise bilaterally, intact strength bilateral lower extremities, sensory exam intact bilateral lower extremities. Vitals reviewed. Assessment & Plan:    ICD-10-CM ICD-9-CM    1.  Chronic midline low back pain without sciatica  M54.5 724.2 REFERRAL TO ORTHOPEDIC SURGERY    G89.29 338.29 XR SPINE LUMB 2 OR 3 V      XR SPINE THORAC 3 V   Reviewed diagnosis today with patient and recommend lumbar thoracic x-ray. Will likely need lumbar MRI. Referral to orthopedic surgery. Recommend weight loss. Orders Placed This Encounter    XR SPINE LUMB 2 OR 3 V    XR SPINE THORAC 3 V    REFERRAL TO ORTHOPEDIC SURGERY    glimepiride (AMARYL) 4 mg tablet    metFORMIN ER (GLUCOPHAGE XR) 500 mg tablet    HUMALOG KWIKPEN INSULIN 100 unit/mL kwikpen    NOHEMY PEN NEEDLE 32 gauge x 5/32\" ndle      Counseled regarding elevated BMI and current weight goals. Reviewed weight loss strategies including dietary changes and exercise. Follow-up Disposition:  Return if symptoms worsen or fail to improve. Advised her to call back or return to office if symptoms worsen/change/persist.  Discussed expected course/resolution/complications of diagnosis in detail with patient. Medication risks/benefits/costs/interactions/alternatives discussed with patient. She was given an after visit summary which includes diagnoses, current medications, & vitals. She expressed understanding with the diagnosis and plan.

## 2018-06-22 NOTE — PROGRESS NOTES
Chief Complaint   Patient presents with    LOW BACK PAIN     dull pain     Patient states she is here for low back pain. Patient states pain is dull and comes and goes all day long. Patient states chiropractor voiced to her she has DDD and compression fracture. Patient states she has done water aerobics, dry needling, therapeutic massage, chiropractic adjustments and general strength training. Patient states nothing has helped.

## 2018-06-27 ENCOUNTER — TELEPHONE (OUTPATIENT)
Dept: INTERNAL MEDICINE CLINIC | Age: 63
End: 2018-06-27

## 2018-07-20 ENCOUNTER — HOSPITAL ENCOUNTER (OUTPATIENT)
Dept: BONE DENSITY | Age: 63
Discharge: HOME | End: 2018-07-20
Attending: INTERNAL MEDICINE
Payer: COMMERCIAL

## 2018-07-20 DIAGNOSIS — M81.0 SENILE OSTEOPOROSIS: ICD-10-CM

## 2018-07-20 PROCEDURE — 77080 DXA BONE DENSITY AXIAL: CPT

## 2018-08-09 RX ORDER — SIMVASTATIN 40 MG/1
TABLET, FILM COATED ORAL
Qty: 90 TAB | Refills: 0 | Status: SHIPPED | OUTPATIENT
Start: 2018-08-09 | End: 2019-02-21 | Stop reason: SDUPTHER

## 2018-10-12 ENCOUNTER — OFFICE VISIT (OUTPATIENT)
Dept: INTERNAL MEDICINE CLINIC | Age: 63
End: 2018-10-12

## 2018-10-12 VITALS
HEIGHT: 64 IN | HEART RATE: 72 BPM | WEIGHT: 208 LBS | OXYGEN SATURATION: 96 % | BODY MASS INDEX: 35.51 KG/M2 | DIASTOLIC BLOOD PRESSURE: 72 MMHG | SYSTOLIC BLOOD PRESSURE: 131 MMHG | RESPIRATION RATE: 16 BRPM | TEMPERATURE: 98.5 F

## 2018-10-12 DIAGNOSIS — R40.0 DAYTIME SOMNOLENCE: ICD-10-CM

## 2018-10-12 DIAGNOSIS — Z00.00 ROUTINE GENERAL MEDICAL EXAMINATION AT A HEALTH CARE FACILITY: Primary | ICD-10-CM

## 2018-10-12 DIAGNOSIS — E78.5 HYPERLIPIDEMIA WITH TARGET LDL LESS THAN 100: ICD-10-CM

## 2018-10-12 DIAGNOSIS — I10 ESSENTIAL HYPERTENSION: ICD-10-CM

## 2018-10-12 DIAGNOSIS — L65.9 HAIR LOSS: ICD-10-CM

## 2018-10-12 DIAGNOSIS — E11.9 TYPE 2 DIABETES MELLITUS WITHOUT COMPLICATION, WITHOUT LONG-TERM CURRENT USE OF INSULIN (HCC): ICD-10-CM

## 2018-10-12 DIAGNOSIS — E66.09 CLASS 2 OBESITY DUE TO EXCESS CALORIES WITH BODY MASS INDEX (BMI) OF 35.0 TO 35.9 IN ADULT, UNSPECIFIED WHETHER SERIOUS COMORBIDITY PRESENT: ICD-10-CM

## 2018-10-12 PROBLEM — F32.A MILD DEPRESSION: Status: ACTIVE | Noted: 2018-10-12

## 2018-10-12 NOTE — PROGRESS NOTES
HPI: 
Terra Mccartney is a 61y.o. year old female who returns to clinic today for physical and follow up: AODM, HTN, hypercholesterolemia. Health maintenance reviewed and updated with patient today at visit. She would like to have the shingles vaccine. 1. Endocrine Review  Since last visit: followed by Dr Diana Seymour. Home glucose monitorin-200 and notes the higher numbers have been for the past month after her brother passed away. She states has not been eating as good of a diabetic diet.    She reports medication compliance: compliant some of the time.  She reports the following medication side effects: none.   
2.Cardiovascular: She reports taking medications as instructed, no medication side effects noted, The home BP readings outside of office: not checing.  Diet and Lifestyle: generally follows a low fat low cholesterol diet, generally follows a low sodium diet. Exercise: not exercising recently. 3. Depression:  Treatment includes Lexapro and no other therapies. Ongoing symptoms include feeling more down since brother passed away. She notes sleeping more. She denies anhedonia and No suicidal ideation. .  
She experiences the following side effects from the treatment: none.    
4. Lumbar stenosis: has has steroid injection which did help. She is in PT for her neck pain. No pain in back today and usually occurs if she walks or stands for prolonged periods. Current Outpatient Prescriptions Medication Sig Dispense Refill  simvastatin (ZOCOR) 40 mg tablet TAKE 1 TABLET BY MOUTH AT BEDTIME 90 Tab 0  
 glimepiride (AMARYL) 4 mg tablet TAKE 1 TABLET WITH BREAKFAST OR THE FIRST MAIN MEAL OF THE DAY ONCE A DAY  6  
 metFORMIN ER (GLUCOPHAGE XR) 500 mg tablet TAKE 4 TABLETS WITH BREAKFAST ONCE A DAY  6  
 HUMALOG KWIKPEN INSULIN 100 unit/mL kwikpen INJECT 5 UNITS PER 15 GRAMS OF CARBS UP  UNITS PER DAY SUBCUTANEOUSLY  2  
 NOHEMY PEN NEEDLE 32 gauge x \" ndle USE AS DIRECTED FOR INSULIN INJECTIONS 4 TIMES A DAY  3  
 naproxen sodium (ALEVE) 220 mg tablet Take 220 mg by mouth two (2) times daily (with meals).  TRESIBA FLEXTOUCH U-200 200 unit/mL (3 mL) inpn INJECT 75 UNITS DAILY  6  
 allopurinol (ZYLOPRIM) 300 mg tablet TAKE 1 TABLET BY MOUTH EVERY DAY 30 Tab 5  
 escitalopram oxalate (LEXAPRO) 20 mg tablet TAKE 1 TABLET BY MOUTH DAILY 30 Tab 5  
 valsartan-hydroCHLOROthiazide (DIOVAN-HCT) 80-12.5 mg per tablet TAKE 1 TABLET BY MOUTH EVERY DAY 30 Tab 5  
 NYSTOP powder APPLY TO AFFECTED AREA TWO (2) TIMES A DAY. AS NEEDED 60 g 1  
 BYDUREON 2 mg/0.65 mL pnij 2 mg by SubCUTAneous route every seven (7) days. 3  
 ibuprofen (ADVIL) 200 mg tablet Take  by mouth.  fenofibrate (LOFIBRA) 54 mg tablet Take  by mouth daily.  terbinafine HCl (LAMISIL) 1 % topical cream Apply  to affected area two (2) times a day. (Patient taking differently: Apply  to affected area two (2) times daily as needed.) 30 g 0  
 glucose blood VI test strips (CONWEAVERIA CONTOUR) strip by Does Not Apply route See Admin Instructions. Boo contour test strips, tests 2-4 times per day. Ordered by Dr. Nicky Mondragon.  Insulin Syringe-Needle U-100 (BD INSULIN SYRINGE ULT-FINE II) 1 mL 31 x 5/16\" syrg USE 4 TIMES A DAY AS DIRECTED 100 Syringe 5  
 aspirin 81 mg tablet Take 81 mg by mouth. Allergies Allergen Reactions  Adhesive Tape Contact Dermatitis Blistering UNDER STERI-STRIPS  Contrast Agent [Iodine] Hives  Pcn [Penicillins] Hives  Levaquin [Levofloxacin] Nausea and Vomiting  Other Medication Hives and Swelling CRAB Past Medical History:  
Diagnosis Date  Achilles tendonitis  Allergic rhinitis  Calculus 2.28/11 EXCESS URIC ACID; HAS STONES CURRENTLY  Chronic pain  Color vision deficiency  Depression  Diabetes ([facility] [address]  Hypercholesterolemia  Hypertension  Incisional hernia 10/17/2012  Pancreatitis 1969 CYST ON PANCREAS BURST BEFORE SURG FOR REMOVAL  Trigger thumb of left hand  Unspecified adverse effect of anesthesia WOKE UP BEFORE END OF SURGERY WITH MAC & CATH  
 Unspecified essential hypertension 1/27/2010 Past Surgical History:  
Procedure Laterality Date  ABDOMEN SURGERY PROC UNLISTED  2014  
 hernia repair  CARDIAC SURG PROCEDURE UNLIST  2002 CARDIAC CATH FOR PALPITATIONS  
 HX CATARACT REMOVAL Bilateral 2015 Brunilda Choi [facility]  HX GI    
 pancreatitis; CYST  
 HX GI  05/29/15  
 hernia repair, #2  
 HX GI  2014 HERNIA REPAIR #1  
[facility]  [id number]  
 incisional hernia repair with component separation  HX GYN    
 5 [facility]rat[facility]  HX HEENT    
 LASER SURGERY BOTH EYES  
 HX HEENT  7-2-15  
 left catarac surgery  HX HEENT  7-16-15  
 right catarac surgery  HX HERNIA REPAIR  1-14-16  
 recurrent ventral incisional hernia repair-[facility]-Dr. Sujey Muhammad  HX HERNIA REPAIR  2015  HX HERNIA REPAIR  08/01/2016  
 hernia mesh repair [address]  HX OOPHORECTOMY  3/11  
 benign ovarian cyst  
 HX ORTHOPAEDIC  1982 TUMOR EXCISED L MIDDLE FINGER  
 [address] [facility] MOLES EXCISED-FACE & BACK (WOKE DURING)  HX OTHER SURGICAL  08/04/2016  
 exploratory lap, DIONNE  HX OVARIAN CYST REMOVAL  3/2011 Family History Problem Relation Age of Onset  Lung Disease Mother   
  copd  Cancer Father LUNG, LIVER, skin  Other Brother ACROMEGALY  Cancer Brother   
  melanoma  Anesth Problems Neg Hx Social History Substance Use Topics  Smoking status: Never Smoker  Smokeless tobacco: Never Used  Alcohol use No  
   
 
Review of Systems Constitutional: Positive for malaise/fatigue. Negative for chills and fever. HENT: Negative for congestion, sinus pain and sore throat. Eyes: Negative for double vision. Respiratory: Negative for cough and shortness of breath. Cardiovascular: Negative for chest pain and leg swelling. Gastrointestinal: Negative for abdominal pain, blood in stool, constipation, diarrhea, heartburn and nausea. Genitourinary: Negative for dysuria, frequency and urgency. Musculoskeletal: Positive for back pain. Neurological: Negative for dizziness, sensory change, focal weakness and headaches. Endo/Heme/Allergies:  
     She complains of some diffuse thinning of her hair. No family history of hair loss. Psychiatric/Behavioral: Negative for suicidal ideas. The patient is not nervous/anxious. Physical Exam  
Constitutional: She appears well-developed. No distress. /72  Pulse 72  Temp 98.5 °F (36.9 °C) (Oral)   Resp 16  Ht 5' 4\" (1.626 m)  Wt 208 lb (94.3 kg)  SpO2 96%  BMI 35.7 kg/m2 HENT:  
Head: Normocephalic and atraumatic. Nose: Nose normal.  
Mouth/Throat: Oropharynx is clear and moist.  
Neck: Carotid bruit is not present. No thyromegaly present. Cardiovascular: Normal rate, regular rhythm, normal heart sounds and intact distal pulses. No murmur heard. Pulmonary/Chest: Effort normal and breath sounds normal. She has no wheezes. Abdominal: Soft. Bowel sounds are normal. She exhibits no distension and no mass. There is no tenderness. Musculoskeletal: She exhibits no edema. Lymphadenopathy:  
  She has no cervical adenopathy. Neurological:  
nonfocal  
Psychiatric: She has a normal mood and affect. Vitals reviewed. Assessment & Plan: ICD-10-CM ICD-9-CM 1. Routine general medical examination at a health care facility Health maintenance reviewed and updated with patient today at visit. Z00.00 V70.0 2. Essential hypertension Well controlled, continue current medication. I10 401.9 3. Hyperlipidemia with target LDL less than 100 Check lab work. Continue current medication. E78.5 272.4 4. Type 2 diabetes mellitus without complication, without long-term current use of insulin (HCC) E11.9 250.00  DIABETES FOOT EXAM  
5. Class 2 obesity due to excess calories with body mass index (BMI) of 35.0 to 35.9 in adult, unspecified whether serious comorbidity present Counseled regarding elevated BMI and current weight goals. Reviewed weight loss strategies including dietary changes and exercise. E66.09 278.00   
 Z68.35 V85.35   
6. Daytime somnolence Refer for sleep medicine evaluation possible sleep apnea. R40.0 780.54 SLEEP MEDICINE REFERRAL 7. Hair loss Had thyroid studies to lab work. L65.9 704.00 Lab work has been drawn yesterday and is pending. Orders Placed This Encounter  SLEEP MEDICINE REFERRAL  
  DIABETES FOOT EXAM  
 varicella-zoster recombinant, PF, (SHINGRIX, PF,) 50 mcg/0.5 mL susr injection Follow-up Disposition: 
Return in about 6 months (around 4/12/2019). Advised her to call back or return to office if symptoms worsen/change/persist. 
Discussed expected course/resolution/complications of diagnosis in detail with patient. Medication risks/benefits/costs/interactions/alternatives discussed with patient. She was given an after visit summary which includes diagnoses, current medications, & vitals. She expressed understanding with the diagnosis and plan.

## 2018-10-12 NOTE — PROGRESS NOTES
Patient states she feels the Lexapro may be too high of a dose, she is sleeping more than normal. She also complains of abd pain from where she had a previous hernia. Chief Complaint Patient presents with  Complete Physical  
 
 
1. Have you been to the ER, urgent care clinic since your last visit? Hospitalized since your last visit? No 
 
2. Have you seen or consulted any other health care providers outside of the [address] [place] [address] since your last visit? Include any pap smears or colon screening. No 
 
Health Maintenance Due Topic Date Due  Shingrix Vaccine Age 50> (1 of 2) 04/21/2005  
 FOOT EXAM Q1  08/15/2018  HEMOGLOBIN A1C Q6M  09/23/2018 Med Reconciliation completed and up to date and changes noted.  Please update med list.

## 2018-10-18 ENCOUNTER — DOCUMENTATION ONLY (OUTPATIENT)
Dept: INTERNAL MEDICINE CLINIC | Age: 63
End: 2018-10-18

## 2018-11-20 NOTE — PERIOP NOTES
[facility] Ambulatory Surgery Unit Pre-operative Instructions Procedure Date  Tuesday, November 27, 2018            Tentative Arrival Time 230 
 
 
1. On the day of your procedure, please report to the Ambulatory Surgery Unit Registration Desk and sign in at your designated time. The Ambulatory Surgery Unit is located in [facility] on the [facility] side of the [facility] across from the [facility]. Please have all of your health insurance cards and a photo ID. 2. You must have someone with you to drive you home as directed by your surgeon. 3. You may have a light breakfast and take normal morning medications. 4. We recommend you do not drink any alcoholic beverages for 24 hours before and after your procedure. 5. Contact your surgeons office for instructions on the following medications: non-steroidal anti-inflammatory drugs (i.e. Advil, Aleve), vitamins, and supplements. (Some surgeons will want you to stop these medications prior to surgery and others may allow you to take them) **If you are currently taking Plavix, Coumadin, Aspirin and/or other blood-thinning agents, contact your surgeon for instructions. ** Your surgeon will partner with the physician prescribing these medications to determine if it is safe to stop or if you need to continue taking. Please do not stop taking these medications without instructions from your surgeon. 6. In an effort to help prevent surgical site infection, we ask that you shower with an anti-bacterial soap (i.e. Dial or Safeguard) on the morning of your procedure. Do not apply any lotions, powders, or deodorants after showering. 7. Wear comfortable clothes. Wear glasses instead of contacts. Do not bring any jewelry or money (other than copays or fees as instructed). Do not wear make-up, particularly mascara, the morning of your procedure. Wear your hair loose or down, no ponytails, buns, adelia pins or clips.  All body piercings must be removed. 8. You should understand that if you do not follow these instructions your procedure may be cancelled. If your physical condition changes (i.e. fever, cold or flu) please contact your surgeon as soon as possible. 9. It is important that you be on time. If a situation occurs where you may be late, or if you have any questions or problems, please call [phone]. 
 
10. Your procedure time may be subject to change. You will receive a phone call the day prior to confirm your arrival time. I understand a pre-operative phone call will be made to verify my procedure time. In the event that I am not available, I give permission for a message to be left on my answering service and/or with another person? yes Preop instructions reviewed  Pt verbalized understanding.  
 
 ___________________      ___________________      ___________________ 
(Signature of Patient)          (Witness)                   (Date and Time)

## 2018-11-27 ENCOUNTER — APPOINTMENT (OUTPATIENT)
Dept: GENERAL RADIOLOGY | Age: 63
End: 2018-11-27
Attending: PHYSICAL MEDICINE & REHABILITATION
Payer: COMMERCIAL

## 2018-11-27 ENCOUNTER — HOSPITAL ENCOUNTER (OUTPATIENT)
Age: 63
Setting detail: OUTPATIENT SURGERY
Discharge: HOME | End: 2018-11-27
Attending: PHYSICAL MEDICINE & REHABILITATION | Admitting: PHYSICAL MEDICINE & REHABILITATION
Payer: COMMERCIAL

## 2018-11-27 VITALS
BODY MASS INDEX: 35.85 KG/M2 | HEART RATE: 80 BPM | HEIGHT: 64 IN | WEIGHT: 210 LBS | SYSTOLIC BLOOD PRESSURE: 152 MMHG | TEMPERATURE: 98.7 F | RESPIRATION RATE: 16 BRPM | DIASTOLIC BLOOD PRESSURE: 70 MMHG | OXYGEN SATURATION: 96 %

## 2018-11-27 LAB
GLUCOSE BLD STRIP.AUTO-MCNC: 104 MG/DL (ref 65–100)
GLUCOSE BLD STRIP.AUTO-MCNC: 79 MG/DL (ref 65–100)
GLUCOSE BLD STRIP.AUTO-MCNC: 85 MG/DL (ref 65–100)
SERVICE CMNT-IMP: ABNORMAL
SERVICE CMNT-IMP: NORMAL
SERVICE CMNT-IMP: NORMAL

## 2018-11-27 PROCEDURE — 76210000046 HC AMBSU PH II REC FIRST 0.5 HR: Performed by: PHYSICAL MEDICINE & REHABILITATION

## 2018-11-27 PROCEDURE — 74011250636 HC RX REV CODE- 250/636: Performed by: PHYSICAL MEDICINE & REHABILITATION

## 2018-11-27 PROCEDURE — 74011000250 HC RX REV CODE- 250: Performed by: PHYSICAL MEDICINE & REHABILITATION

## 2018-11-27 PROCEDURE — 76030000002 HC AMB SURG OR TIME FIRST 0.: Performed by: PHYSICAL MEDICINE & REHABILITATION

## 2018-11-27 PROCEDURE — 77030003665 HC NDL SPN BBMI -A: Performed by: PHYSICAL MEDICINE & REHABILITATION

## 2018-11-27 PROCEDURE — 82962 GLUCOSE BLOOD TEST: CPT

## 2018-11-27 PROCEDURE — 76000 FLUOROSCOPY <1 HR PHYS/QHP: CPT

## 2018-11-27 PROCEDURE — 72020 X-RAY EXAM OF SPINE 1 VIEW: CPT

## 2018-11-27 RX ORDER — BUPIVACAINE HYDROCHLORIDE 5 MG/ML
5 INJECTION, SOLUTION EPIDURAL; INTRACAUDAL ONCE
Status: COMPLETED | OUTPATIENT
Start: 2018-11-27 | End: 2018-11-27

## 2018-11-27 RX ORDER — METHYLPREDNISOLONE ACETATE 40 MG/ML
40 INJECTION, SUSPENSION INTRA-ARTICULAR; INTRALESIONAL; INTRAMUSCULAR; SOFT TISSUE ONCE
Status: COMPLETED | OUTPATIENT
Start: 2018-11-27 | End: 2018-11-27

## 2018-11-27 RX ORDER — LIDOCAINE HYDROCHLORIDE 20 MG/ML
10 INJECTION, SOLUTION INFILTRATION; PERINEURAL ONCE
Status: COMPLETED | OUTPATIENT
Start: 2018-11-27 | End: 2018-11-27

## 2018-11-27 NOTE — PERIOP NOTES
[address] flutters in stomach, usually feels this way when blood sugar is low. Patient states she took her insulin this  morning,  humalog 10 units and 74 units of tresiba as well as metformin. She has been NPO. Her blood sugar was 234 this morning and at 11am 166. .  Pre procedure BS was 106. Now blood sugar is 79. Patient given peanut crackers and regular gingerale (refuses applejuice). Patient with bilateral strong equal dorsi and plantar fexion. No c/o pain or discomfort. 1605 Repeat BS 85.  Patient states fluttering is gone in stomach. She is going to get something to eat

## 2018-11-27 NOTE — OP NOTES
Facet Steroid Injection Operative Report    Indications: This is a 61 y.o. female who presents with LBP. She was positive for LS DJD. The patient was admitted for surgery as conservative measures have failed. Date of Surgery: 11/27/2018    Preoperative Diagnosis: LS DJD    Postoperative Diagnosis: LS DJD    Surgeon(s) and Role:     * Faisal Perez MD - Primary     Procedure:  Procedure(s):  BILATERAL L4-5 L5-S1 FACET    Procedure in Detail:  After appropriate informed consent was obtained, the patient was taken to the operating suite and placed in the prone position on the operating table on appropriate padding. The LS region was prepped and draped in the usual sterile fashion. Intraoperative fluoroscopy was used to localize the LS spine. The skin was infiltrated with 2% lidocaine. An 22-g needle was advanced into the Niall L4-5 and L5-S1 facets under fluoroscopic guidance. Next, 2ml of 0.5% marcaine and 80mg of Depo-Medrol were injected. The needle was removed from the patient. The patient was then turned back into the supine position on the stretcher and was taken to the Recovery Room in stable condition.     Estimated Blood Loss:  none     Specimens: None       Drains: None          Complications:  None    Signed By: Susana Walker MD                        November 27, 2018

## 2018-11-27 NOTE — PERIOP NOTES
Skin assessment: 
 WNL Skin color: normal for ethnicity Skin condition: small cut to right arm and left hand Skin integrity: WNL Turgor: normal 
 
Neuro:  Push/Pull assessment: LUE Response: strong  LLE Response: strong push, moderate pull RUE Response: strong - equal 
 RLE Response: strong push/ moderate pull OB/Gyn assessment: 
 
LMP: n/a If no menstrual period then reason: n/a

## 2018-11-27 NOTE — DISCHARGE INSTRUCTIONS
Discharge Instructions  Lumbar Facet Block/ Medial Branch Block  You had a lumbar facet injection today. You will probably have some numbness in your low back area for the next 6 to 8 hours. The steroids will slowly become effective, reducing your pain, over the next 2 weeks. You should begin feeling better after a few days, but it may take up to 2 weeks to notice the difference. The benefit you get from your injection will last a variable amount of time, depending on the severity of your lumbar spine problem. If the results you experience are significant, but not long lasting, you may be a candidate for a procedure that can be longer lasting (radiofrequency ablation of the nerves innervating the facet joints).  Pain:  Most people do not have any increase in pain after this injection. However, you might experience some soreness at the site of the injection. If this happens, putting an ice pack over the sore area will help.  Bandage: You have a small bandage covering the site of the injection. You may remove it once you get home.  Restrictions:  Someone should drive you home after the injection. After that, you have no restrictions. You may resume your normal level of activity. You may take a shower or bath, and you may eat normally. You should continue your current exercises and/or therapy routine.  Medications:  Continue your current medications as prescribed. If your pain decreases, you may reduce the amount of your pain medicines. If you stopped taking anticoagulants or blood-thinners before the injection, start them tomorrow. If you have diabetes, your blood sugar may be elevated for a few days. Call your primary doctor with any questions.   Call Dr. Aneudy Martin at [phone] if you experience:   Fever (101 degrees Fahrenheit or greater)   Nausea or vomiting   Headache unrelieved by your normal pain medicine   Redness or swelling at the injection site that lasts more than 1 day   New numbness, tingling, weakness, or pain that you didnt have before the injection. If still having pain in 1-2 weeks, call office at [phone] for a follow up appointment. DISCHARGE SUMMARY from Nurse    The following personal items collected during your admission are returned to you:   Dental Appliance:    Vision:    Hearing Aid:    Jewelry:    Clothing:    Other Valuables:    Valuables sent to safe: If you were given prescriptions, please review the written information on prescribed medications. · You will receive a Post Operative Call from one of the Recovery Room Nurses on the day after your surgery to check on you. It is very important for us to know how you are recovering after your surgery. · You may receive an e-mail or letter in the mail from [facility] regarding your experience with us in the Ambulatory Surgery Unit. Your feedback is valuable to us and we appreciate your participation in the survey. If you have not had your influenza or pneumococcal vaccines, please follow up with your primary care physician. The discharge information has been reviewed with the patient. The patient verbalized understanding.

## 2018-11-27 NOTE — H&P
Procedural Case Note 
 
11/27/2018    (2:43 PM) Seth Yi 1955   (61 y.o.) 
 
[id number] CC:  pain ROS:   Complete ROS obtained, no CP, no SOB, no N or V 
 
PMH:    
Past Medical History:  
Diagnosis Date  Achilles tendonitis  Adverse effect of anesthesia   
 woke during surgical procedure with MAC and cath  Allergic rhinitis  Calculus 2.28/11 EXCESS URIC ACID; HAS STONES CURRENTLY  Chronic pain  Color vision deficiency  Depression  Diabetes ([facility] [address]  Hypercholesterolemia  Hypertension  Incisional hernia 10/17/2012  Pancreatitis 1969 CYST ON PANCREAS BURST BEFORE SURG FOR REMOVAL  Trigger thumb of left hand   
 and right hand  Unspecified essential hypertension 1/27/2010 ALLERGIES:    
Allergies Allergen Reactions  Adhesive Tape Contact Dermatitis Blistering UNDER STERI-STRIPS  Contrast Agent [Iodine] Hives  Pcn [Penicillins] Hives  Levaquin [Levofloxacin] Nausea and Vomiting  Other Medication Hives and Swelling CRAB  
 
 
MEDS:    
No current facility-administered medications for this encounter. Visit Vitals /68 (BP 1 Location: Right arm, BP Patient Position: At rest) Pulse (!) 104 Temp 98.1 °F (36.7 °C) Resp 17 Ht 5' 4\" (1.626 m) Wt 95.3 kg (210 lb) SpO2 96% BMI 36.05 kg/m² PE: 
Gen: NAD Head: normocephalic Heart: RRR Lungs: CTA emanuel Abd: NT, ND, soft Neuro: awake and alert Skin: intact IMPRESSION:   LS DJD Note:  The clinical status was discussed in detail with the patient. The procedure was again discussed and described in detail. All understand and accept the planned procedure and risks; reject other forms of treatment. All questions are answered.  
 
Dottie Hoyos MD

## 2018-11-27 NOTE — PERIOP NOTES
Salma Patino 1955 
[id number] Situation: 
Verbal report given from: TUAN Siegel RN Procedure: Procedure(s): BILATERAL L4-5 L5-S1 FACET Background: 
 
Preoperative diagnosis: LUMBAR DEGENERATIVE DISC DISEASE Postoperative diagnosis: LUMBAR DEGENERATIVE DISC DISEASE :  Dr. Ziggy Arthur Assessment: 
Intra-procedure medications, procedure, and allergies reviewed Recommendation: 
 
Discharge patient home after discharge instructions reviewed with patient. Rest until local has worn off.

## 2018-12-17 NOTE — H&P
CARE TEAM:  Patient Care Team:  Joan Sesay MD as PCP - General (Internal Medicine)     ASSESSMENT:  1. Trigger finger of right thumb    2. Carpal tunnel syndrome of right wrist              Patient Active Problem List   Diagnosis    Diabetes mellitus    Depression         PLAN:  Treatment Plan:  We discussed treatment options at length for her trigger thumb. She has failed conservative treatment consisting of a cortisone injection. I have recommended operative management. Sedation. We discussed reasonable expectations. We discussed usual postoperative course. She has given informed consent to proceed.     No orders of the defined types were placed in this encounter. Follow-up: Return for first postoperative visit.      HISTORY OF PRESENT ILLNESS:  Chief Complaint: Trigger Finger of the Right Thumb     Age: 61 y.o. Sex: female   History of present illness: Sandeep Villafuerte a pleasant 61 y.o. female who presents today for evaluation of right thumb trigger. She was seen in September which trigger thumb and carpal tunnel. She was given an injection for her trigger thumb. She was treated conservatively for her carpal tunnel with nighttime extension splint. She reports her carpal tunnel symptoms are much improved. She reports that the injection for trigger thumb provided minimal if any relief. Still has significant mechanical symptoms. Still has significant pain with lifting or gripping activities. Denies numbness. She does have diabetes. No current facility-administered medications on file prior to encounter.       Current Outpatient Medications on File Prior to Encounter   Medication Sig Dispense Refill    simvastatin (ZOCOR) 40 mg tablet TAKE 1 TABLET BY MOUTH AT BEDTIME 90 Tab 0    glimepiride (AMARYL) 4 mg tablet TAKE 1 TABLET WITH BREAKFAST OR THE FIRST MAIN MEAL OF THE DAY ONCE A DAY  6    metFORMIN ER (GLUCOPHAGE XR) 500 mg tablet two tabs with breakfast and two tabs with dinner  6  HUMALOG KWIKPEN INSULIN 100 unit/mL kwikpen INJECT 5 UNITS PER 15 GRAMS OF CARBS UP  UNITS PER DAY SUBCUTANEOUSLY  2    NOHEMY PEN NEEDLE 32 gauge x 5/32\" ndle USE AS DIRECTED FOR INSULIN INJECTIONS 4 TIMES A DAY  3    naproxen sodium (ALEVE) 220 mg tablet Take 220 mg by mouth two (2) times daily (with meals).  TRESIBA FLEXTOUCH U-200 200 unit/mL (3 mL) inpn INJECT 75 UNITS DAILY  6    allopurinol (ZYLOPRIM) 300 mg tablet TAKE 1 TABLET BY MOUTH EVERY DAY 30 Tab 5    escitalopram oxalate (LEXAPRO) 20 mg tablet TAKE 1 TABLET BY MOUTH DAILY 30 Tab 5    valsartan-hydroCHLOROthiazide (DIOVAN-HCT) 80-12.5 mg per tablet TAKE 1 TABLET BY MOUTH EVERY DAY 30 Tab 5    NYSTOP powder APPLY TO AFFECTED AREA TWO (2) TIMES A DAY. AS NEEDED 60 g 1    BYDUREON 2 mg/0.65 mL pnij 2 mg by SubCUTAneous route every seven (7) days. 3    ibuprofen (ADVIL) 200 mg tablet Take  by mouth every six (6) hours as needed.  fenofibrate (LOFIBRA) 54 mg tablet Take  by mouth daily.  terbinafine HCl (LAMISIL) 1 % topical cream Apply  to affected area two (2) times a day. (Patient taking differently: Apply  to affected area two (2) times daily as needed.) 30 g 0    glucose blood VI test strips (ASCENSIA CONTOUR) strip by Does Not Apply route See Admin Instructions. Boo contour test strips, tests 2-4 times per day. Ordered by Dr. Nicky Mondragon.  Insulin Syringe-Needle U-100 (BD INSULIN SYRINGE ULT-FINE II) 1 mL 31 x 5/16\" syrg USE 4 TIMES A DAY AS DIRECTED 100 Syringe 5    aspirin 81 mg tablet Take 81 mg by mouth daily.          Past Medical History:   Diagnosis Date    Achilles tendonitis     Adverse effect of anesthesia     woke during surgical procedure with MAC and cath    Allergic rhinitis     Calculus 2.28/11    EXCESS URIC ACID; HAS STONES CURRENTLY    Chronic pain     Color vision deficiency     Depression     Diabetes ([address]     Hypercholesterolemia     Hypertension     Incisional hernia 10/17/2012    Pancreatitis 1969    CYST ON PANCREAS BURST BEFORE SURG FOR REMOVAL    Trigger thumb of left hand     and right hand    Unspecified essential hypertension 1/27/2010       Allergies   Allergen Reactions    Adhesive Tape Contact Dermatitis     Blistering UNDER STERI-STRIPS    Contrast Agent [Iodine] Hives    Pcn [Penicillins] Hives    Levaquin [Levofloxacin] Nausea and Vomiting    Other Medication Hives and Swelling     CRAB       Social History     Socioeconomic History    Marital status:      Spouse name: Not on file    Number of children: Not on file    Years of education: Not on file    Highest education level: Not on file   Social Needs    Financial resource strain: Not on file    Food insecurity - worry: Not on file    Food insecurity - inability: Not on file   [employer] needs - medical: Not on file   [employer] needs - non-medical: Not on file   Occupational History    Not on file   Tobacco Use    Smoking status: Never Smoker    Smokeless tobacco: Never Used   Substance and Sexual Activity    Alcohol use: No    Drug use: No    Sexual activity: Yes     Partners: Male     Birth control/protection: None, Surgical   Other Topics Concern    Not on file   Social History Narrative    Not on file          Past medical history, past surgical history, medications, allergies, social history, and review of systems have been reviewed by me.     Review of Systems   12/7/2018     Constitutional: Unexplained: Negative  Genitourinary: Frequent Urination: Negative  HEENT: Vision Loss: Positive  Neurological: Memory Loss: Negative  Integumentary: Rash: Negative  Cardiovascular: Palpatations: Negative  Hematologic: Bruises/Bleeds Easily: Negative  Gastrointestinal: Constipation: Negative  Immunological: Seasonal Allergies: Positive  Musculoskeletal: Joint Pain: Positive        OBJECTIVE:  Constitutional:  No acute distress. Pleasant and cooperative with exam.  HEENT: Normocephalic. Atraumatic. Eyes are clear  Hearing intact to spoken word   Respiratory:  Breathing unlabored. .  Cardiovascular:  No marked edema. Psychiatric: Alert.  Affect appropriate. Gait: Normal.  Musculoskeletal    Skin is intact. She has significant triggering of the thumb. Large nodule underneath the A1 pulley. Significant tenderness at the A1 pulley.   Intact sensibility.     IMAGING / STUDIES:           No imaging obtained

## 2018-12-19 ENCOUNTER — ANESTHESIA EVENT (OUTPATIENT)
Dept: MEDSURG UNIT | Age: 63
End: 2018-12-19
Payer: COMMERCIAL

## 2018-12-19 ENCOUNTER — HOSPITAL ENCOUNTER (OUTPATIENT)
Age: 63
Setting detail: OUTPATIENT SURGERY
Discharge: HOME | End: 2018-12-19
Attending: ORTHOPAEDIC SURGERY | Admitting: ORTHOPAEDIC SURGERY
Payer: COMMERCIAL

## 2018-12-19 ENCOUNTER — ANESTHESIA (OUTPATIENT)
Dept: MEDSURG UNIT | Age: 63
End: 2018-12-19
Payer: COMMERCIAL

## 2018-12-19 VITALS
RESPIRATION RATE: 15 BRPM | TEMPERATURE: 97.6 F | HEIGHT: 64 IN | OXYGEN SATURATION: 99 % | WEIGHT: 210.1 LBS | DIASTOLIC BLOOD PRESSURE: 74 MMHG | SYSTOLIC BLOOD PRESSURE: 138 MMHG | HEART RATE: 77 BPM | BODY MASS INDEX: 35.87 KG/M2

## 2018-12-19 DIAGNOSIS — M65.311 TRIGGER THUMB OF RIGHT HAND: Primary | ICD-10-CM

## 2018-12-19 LAB
GLUCOSE BLD STRIP.AUTO-MCNC: 117 MG/DL (ref 65–100)
GLUCOSE BLD STRIP.AUTO-MCNC: 173 MG/DL (ref 65–100)
SERVICE CMNT-IMP: ABNORMAL
SERVICE CMNT-IMP: ABNORMAL

## 2018-12-19 PROCEDURE — 76030000002 HC AMB SURG OR TIME FIRST 0.: Performed by: ORTHOPAEDIC SURGERY

## 2018-12-19 PROCEDURE — 74011250636 HC RX REV CODE- 250/636: Performed by: ANESTHESIOLOGY

## 2018-12-19 PROCEDURE — 74011250636 HC RX REV CODE- 250/636

## 2018-12-19 PROCEDURE — 74011250636 HC RX REV CODE- 250/636: Performed by: ORTHOPAEDIC SURGERY

## 2018-12-19 PROCEDURE — 82962 GLUCOSE BLOOD TEST: CPT

## 2018-12-19 PROCEDURE — 77030018836 HC SOL IRR NACL ICUM -A: Performed by: ORTHOPAEDIC SURGERY

## 2018-12-19 PROCEDURE — 76210000035 HC AMBSU PH I REC 1 TO 1.5 HR: Performed by: ORTHOPAEDIC SURGERY

## 2018-12-19 PROCEDURE — 76060000073 HC AMB SURG ANES FIRST 0.5 HR: Performed by: ORTHOPAEDIC SURGERY

## 2018-12-19 PROCEDURE — 74011250637 HC RX REV CODE- 250/637: Performed by: ORTHOPAEDIC SURGERY

## 2018-12-19 PROCEDURE — 77030020782 HC GWN BAIR PAWS FLX 3M -B

## 2018-12-19 PROCEDURE — 77030002916 HC SUT ETHLN J&J -A: Performed by: ORTHOPAEDIC SURGERY

## 2018-12-19 PROCEDURE — 76210000050 HC AMBSU PH II REC 0.5 TO 1 HR: Performed by: ORTHOPAEDIC SURGERY

## 2018-12-19 PROCEDURE — 77030020754 HC CUF TRNQT 2BLA STRY -B: Performed by: ORTHOPAEDIC SURGERY

## 2018-12-19 PROCEDURE — 74011000250 HC RX REV CODE- 250: Performed by: ORTHOPAEDIC SURGERY

## 2018-12-19 RX ORDER — HYDROMORPHONE HYDROCHLORIDE 2 MG/ML
0.5 INJECTION, SOLUTION INTRAMUSCULAR; INTRAVENOUS; SUBCUTANEOUS
Status: DISCONTINUED | OUTPATIENT
Start: 2018-12-19 | End: 2018-12-19 | Stop reason: HOSPADM

## 2018-12-19 RX ORDER — HYDROCODONE BITARTRATE AND ACETAMINOPHEN 5; 325 MG/1; MG/1
1 TABLET ORAL ONCE
Status: COMPLETED | OUTPATIENT
Start: 2018-12-19 | End: 2018-12-19

## 2018-12-19 RX ORDER — SODIUM CHLORIDE 0.9 % (FLUSH) 0.9 %
5-10 SYRINGE (ML) INJECTION AS NEEDED
Status: DISCONTINUED | OUTPATIENT
Start: 2018-12-19 | End: 2018-12-19 | Stop reason: HOSPADM

## 2018-12-19 RX ORDER — MIDAZOLAM HYDROCHLORIDE 1 MG/ML
INJECTION, SOLUTION INTRAMUSCULAR; INTRAVENOUS AS NEEDED
Status: DISCONTINUED | OUTPATIENT
Start: 2018-12-19 | End: 2018-12-19 | Stop reason: HOSPADM

## 2018-12-19 RX ORDER — PROPOFOL 10 MG/ML
INJECTION, EMULSION INTRAVENOUS AS NEEDED
Status: DISCONTINUED | OUTPATIENT
Start: 2018-12-19 | End: 2018-12-19 | Stop reason: HOSPADM

## 2018-12-19 RX ORDER — MORPHINE SULFATE 10 MG/ML
2 INJECTION, SOLUTION INTRAMUSCULAR; INTRAVENOUS
Status: DISCONTINUED | OUTPATIENT
Start: 2018-12-19 | End: 2018-12-19 | Stop reason: HOSPADM

## 2018-12-19 RX ORDER — LIDOCAINE HYDROCHLORIDE 10 MG/ML
0.1 INJECTION, SOLUTION EPIDURAL; INFILTRATION; INTRACAUDAL; PERINEURAL AS NEEDED
Status: DISCONTINUED | OUTPATIENT
Start: 2018-12-19 | End: 2018-12-19 | Stop reason: HOSPADM

## 2018-12-19 RX ORDER — FENTANYL CITRATE 50 UG/ML
INJECTION, SOLUTION INTRAMUSCULAR; INTRAVENOUS AS NEEDED
Status: DISCONTINUED | OUTPATIENT
Start: 2018-12-19 | End: 2018-12-19 | Stop reason: HOSPADM

## 2018-12-19 RX ORDER — PROPOFOL 10 MG/ML
INJECTION, EMULSION INTRAVENOUS
Status: DISCONTINUED | OUTPATIENT
Start: 2018-12-19 | End: 2018-12-19

## 2018-12-19 RX ORDER — VANCOMYCIN/0.9 % SOD CHLORIDE 1.5G/250ML
1500 PLASTIC BAG, INJECTION (ML) INTRAVENOUS ONCE
Status: COMPLETED | OUTPATIENT
Start: 2018-12-19 | End: 2018-12-19

## 2018-12-19 RX ORDER — ONDANSETRON 2 MG/ML
4 INJECTION INTRAMUSCULAR; INTRAVENOUS AS NEEDED
Status: DISCONTINUED | OUTPATIENT
Start: 2018-12-19 | End: 2018-12-19 | Stop reason: HOSPADM

## 2018-12-19 RX ORDER — SODIUM CHLORIDE 0.9 % (FLUSH) 0.9 %
5-10 SYRINGE (ML) INJECTION EVERY 8 HOURS
Status: DISCONTINUED | OUTPATIENT
Start: 2018-12-19 | End: 2018-12-19 | Stop reason: HOSPADM

## 2018-12-19 RX ORDER — HYDROCODONE BITARTRATE AND ACETAMINOPHEN 5; 325 MG/1; MG/1
1 TABLET ORAL
Qty: 8 TAB | Refills: 0 | Status: SHIPPED | OUTPATIENT
Start: 2018-12-19 | End: 2019-12-26

## 2018-12-19 RX ORDER — ONDANSETRON 2 MG/ML
INJECTION INTRAMUSCULAR; INTRAVENOUS AS NEEDED
Status: DISCONTINUED | OUTPATIENT
Start: 2018-12-19 | End: 2018-12-19 | Stop reason: HOSPADM

## 2018-12-19 RX ORDER — SODIUM CHLORIDE, SODIUM LACTATE, POTASSIUM CHLORIDE, CALCIUM CHLORIDE 600; 310; 30; 20 MG/100ML; MG/100ML; MG/100ML; MG/100ML
50 INJECTION, SOLUTION INTRAVENOUS CONTINUOUS
Status: DISCONTINUED | OUTPATIENT
Start: 2018-12-19 | End: 2018-12-19 | Stop reason: HOSPADM

## 2018-12-19 RX ORDER — LIDOCAINE HYDROCHLORIDE 20 MG/ML
INJECTION, SOLUTION EPIDURAL; INFILTRATION; INTRACAUDAL; PERINEURAL AS NEEDED
Status: DISCONTINUED | OUTPATIENT
Start: 2018-12-19 | End: 2018-12-19 | Stop reason: HOSPADM

## 2018-12-19 RX ORDER — DEXAMETHASONE SODIUM PHOSPHATE 4 MG/ML
INJECTION, SOLUTION INTRA-ARTICULAR; INTRALESIONAL; INTRAMUSCULAR; INTRAVENOUS; SOFT TISSUE AS NEEDED
Status: DISCONTINUED | OUTPATIENT
Start: 2018-12-19 | End: 2018-12-19 | Stop reason: HOSPADM

## 2018-12-19 RX ORDER — FENTANYL CITRATE 50 UG/ML
25 INJECTION, SOLUTION INTRAMUSCULAR; INTRAVENOUS
Status: DISCONTINUED | OUTPATIENT
Start: 2018-12-19 | End: 2018-12-19 | Stop reason: HOSPADM

## 2018-12-19 RX ADMIN — SODIUM CHLORIDE, SODIUM LACTATE, POTASSIUM CHLORIDE, AND CALCIUM CHLORIDE 50 ML/HR: 600; 310; 30; 20 INJECTION, SOLUTION INTRAVENOUS at 07:00

## 2018-12-19 RX ADMIN — MIDAZOLAM HYDROCHLORIDE 2 MG: 1 INJECTION, SOLUTION INTRAMUSCULAR; INTRAVENOUS at 07:21

## 2018-12-19 RX ADMIN — ONDANSETRON 4 MG: 2 INJECTION INTRAMUSCULAR; INTRAVENOUS at 07:34

## 2018-12-19 RX ADMIN — PROPOFOL 20 MG: 10 INJECTION, EMULSION INTRAVENOUS at 07:38

## 2018-12-19 RX ADMIN — LIDOCAINE HYDROCHLORIDE 40 MG: 20 INJECTION, SOLUTION EPIDURAL; INFILTRATION; INTRACAUDAL; PERINEURAL at 07:29

## 2018-12-19 RX ADMIN — FENTANYL CITRATE 25 MCG: 50 INJECTION, SOLUTION INTRAMUSCULAR; INTRAVENOUS at 07:34

## 2018-12-19 RX ADMIN — HYDROCODONE BITARTRATE AND ACETAMINOPHEN 1 TABLET: 5; 325 TABLET ORAL at 09:21

## 2018-12-19 RX ADMIN — DEXAMETHASONE SODIUM PHOSPHATE 4 MG: 4 INJECTION, SOLUTION INTRA-ARTICULAR; INTRALESIONAL; INTRAMUSCULAR; INTRAVENOUS; SOFT TISSUE at 07:34

## 2018-12-19 RX ADMIN — PROPOFOL 50 MG: 10 INJECTION, EMULSION INTRAVENOUS at 07:29

## 2018-12-19 RX ADMIN — VANCOMYCIN HYDROCHLORIDE 1500 MG: 10 INJECTION, POWDER, LYOPHILIZED, FOR SOLUTION INTRAVENOUS at 07:05

## 2018-12-19 RX ADMIN — PROPOFOL 50 MG: 10 INJECTION, EMULSION INTRAVENOUS at 07:34

## 2018-12-19 NOTE — ROUTINE PROCESS
Patient: Leland Ma MRN: [id number]  SSN: [id number]   YOB: 1955  Age: 61 y.o. Sex: female     Patient is status post Procedure(s):  RIGHT TRIGGER THUMB RELEASE.     Surgeon(s) and Role:     * Rand Thurman MD - Primary    Local/Dose/Irrigation:                    Peripheral IV 12/19/18 Left Arm (Active)   Site Assessment Clean, dry, & intact 12/19/2018  7:00 AM   Phlebitis Assessment 0 12/19/2018  7:00 AM   Infiltration Assessment 0 12/19/2018  7:00 AM   Dressing Status Clean, dry, & intact 12/19/2018  7:00 AM   Alcohol Cap Used Yes 12/19/2018  7:00 AM                           Dressing/Packing:  Wound Hand Right-DRESSING TYPE: (xeroform 4x4 cast padding ace) (12/19/18 0700)  Splint/Cast:  ]    Other:

## 2018-12-19 NOTE — DISCHARGE INSTRUCTIONS
Dr. Opal Thomas  Postoperative Instructions    1. Please maintain the dressing placed at surgery for 5 days. You may remove it in 5 days. Please keep the dressing clean and dry for 5 days. In 5 days you may get the wound wet and then cover it with a bandaid. If you have any questions or problems with your dressing, please call our office at [phone].  2. Please elevate the operative extremity to the level of the heart to keep swelling at a minimum. You may get up to move around, but when seated please keep the extremity elevated as much as possible. This will decrease swelling and postoperative pain. You may do activities as tolerated. 3. You may ice your arm/hand 5 times a day for 20 minutes at a time. 4. A prescription for pain medication is provided. The key to pain control is staying ahead of the pain. For the first 48-72 hours after your surgery, you may want to take your pain medication on a regular schedule. After that, you may only need it on an as needed basis. 5. Signs and symptoms of infection include: redness, increased pain, increased swelling not relieved by elevation, drainage, fever, or chills, If you develop any of these symptoms, call the office at [phone]. 6. Please Follow-up at my office 14 days after surgery or when specified at your pre-operative appointment. DISCHARGE SUMMARY from Nurse    PATIENT INSTRUCTIONS:    After general anesthesia or intravenous sedation, for 24 hours or while taking prescription Narcotics:  · Limit your activities  · Do not drive and operate hazardous machinery  · Do not make important personal or business decisions  · Do  not drink alcoholic beverages  · If you have not urinated within 8 hours after discharge, please contact your surgeon on call.     Report the following to your surgeon:  · Excessive pain, swelling, redness or odor of or around the surgical area  · Temperature over 100.5  · Nausea and vomiting lasting longer than 4 hours or if unable to take medications  · Any signs of decreased circulation or nerve impairment to extremity: change in color, persistent  numbness, tingling, coldness or increase pain  · Any questions    What to do at Home:  Recommended activity: Activity as tolerated and no driving for today,     If you experience any of the following symptoms as aforementioned, please follow up with . *  Please give a list of your current medications to your Primary Care Provider. *  Please update this list whenever your medications are discontinued, doses are      changed, or new medications (including over-the-counter products) are added. *  Please carry medication information at all times in case of emergency situations. These are general instructions for a healthy lifestyle:    No smoking/ No tobacco products/ Avoid exposure to second hand smoke  Surgeon General's Warning:  Quitting smoking now greatly reduces serious risk to your health. Obesity, smoking, and sedentary lifestyle greatly increases your risk for illness    A healthy diet, regular physical exercise & weight monitoring are important for maintaining a healthy lifestyle    You may be retaining fluid if you have a history of heart failure or if you experience any of the following symptoms:  Weight gain of 3 pounds or more overnight or 5 pounds in a week, increased swelling in our hands or feet or shortness of breath while lying flat in bed. Please call your doctor as soon as you notice any of these symptoms; do not wait until your next office visit. Recognize signs and symptoms of STROKE:    F-face looks uneven    A-arms unable to move or move unevenly    S-speech slurred or non-existent    T-time-call 911 as soon as signs and symptoms begin-DO NOT go       Back to bed or wait to see if you get better-TIME IS BRAIN. Warning Signs of HEART ATTACK     Call 911 if you have these symptoms:   Chest discomfort.  Most heart attacks involve discomfort in the center of the chest that lasts more than a few minutes, or that goes away and comes back. It can feel like uncomfortable pressure, squeezing, fullness, or pain.  Discomfort in other areas of the upper body. Symptoms can include pain or discomfort in one or both arms, the back, neck, jaw, or stomach.  Shortness of breath with or without chest discomfort.  Other signs may include breaking out in a cold sweat, nausea, or lightheadedness. Don't wait more than five minutes to call 911 - MINUTES MATTER! Fast action can save your life. Calling 911 is almost always the fastest way to get lifesaving treatment. Emergency Medical Services staff can begin treatment when they arrive -- up to an hour sooner than if someone gets to the hospital by car. The discharge information has been reviewed with the patient. The patient verbalized understanding. Discharge medications reviewed with the patient and appropriate educational materials and side effects teaching were provided.   ___________________________________________________________________________________________________________________________________

## 2018-12-19 NOTE — ANESTHESIA PREPROCEDURE EVALUATION
Anesthetic History   No history of anesthetic complications            Review of Systems / Medical History  Patient summary reviewed, nursing notes reviewed and pertinent labs reviewed    Pulmonary  Within defined limits                 Neuro/Psych         Psychiatric history     Cardiovascular    Hypertension              Exercise tolerance: >4 METS     GI/Hepatic/Renal  Within defined limits              Endo/Other    Diabetes    Morbid obesity     Other Findings              Physical Exam    Airway  Mallampati: I  TM Distance: > 6 cm  Neck ROM: normal range of motion   Mouth opening: Normal     Cardiovascular    Rhythm: regular  Rate: normal         Dental  No notable dental hx       Pulmonary  Breath sounds clear to auscultation               Abdominal         Other Findings            Anesthetic Plan    ASA: 2  Anesthesia type: MAC          Induction: Intravenous  Anesthetic plan and risks discussed with: Patient

## 2018-12-19 NOTE — ANESTHESIA POSTPROCEDURE EVALUATION
Post-Anesthesia Evaluation and Assessment    Patient: Juan Francisco Monroe MRN: [id number]  SSN: [id number]    YOB: 1955  Age: 61 y.o. Sex: female      I have evaluated the patient and they are stable and ready for discharge from the PACU. Cardiovascular Function/Vital Signs  Visit Vitals  /62   Pulse 77   Temp 36.4 °C (97.6 °F)   Resp 13   Ht 5' 4\" (1.626 m)   Wt 95.3 kg (210 lb 1.6 oz)   SpO2 95%   BMI 36.06 kg/m²       Patient is status post MAC anesthesia for Procedure(s):  RIGHT TRIGGER THUMB RELEASE. Nausea/Vomiting: None    Postoperative hydration reviewed and adequate. Pain:  Pain Scale 1: Numeric (0 - 10) (12/19/18 0745)  Pain Intensity 1: 0 (12/19/18 0745)   Managed    Neurological Status:   Neuro (WDL): Within Defined Limits (12/19/18 0745)  Neuro  LUE Motor Response: Purposeful (12/19/18 0745)  LLE Motor Response: Purposeful (12/19/18 0745)  RUE Motor Response: Purposeful (12/19/18 0745)  RLE Motor Response: Purposeful (12/19/18 0745)   At baseline    Mental Status, Level of Consciousness: Alert and  oriented to person, place, and time    Pulmonary Status:   O2 Device: Nasal cannula (12/19/18 0749)   Adequate oxygenation and airway patent    Complications related to anesthesia: None    Post-anesthesia assessment completed.  No concerns    Signed By: Juancarlos John MD     December 19, 2018              Procedure(s):  RIGHT TRIGGER THUMB RELEASE.    <BSHSIANPOST>    Visit Vitals  /62   Pulse 77   Temp 36.4 °C (97.6 °F)   Resp 13   Ht 5' 4\" (1.626 m)   Wt 95.3 kg (210 lb 1.6 oz)   SpO2 95%   BMI 36.06 kg/m²

## 2018-12-19 NOTE — INTERVAL H&P NOTE
H&P Update:  Salma Patino was seen and examined. History and physical has been reviewed. The patient has been examined.  There have been no significant clinical changes since the completion of the originally dated History and Physical.    Signed By: Aneesh Morataya MD     December 19, 2018 7:21 AM

## 2018-12-19 NOTE — OP NOTES
PREOPERATIVE DIAGNOSIS: Right thumb trigger.     POSTOPERATIVE DIAGNOSIS: Right thumb trigger     PROCEDURES PERFORMED: Release of right thumb trigger.     SURGEON: Judah Catalan. Tish Braxton MD.     ANESTHESIA: Sedation.     ESTIMATED BLOOD LOSS: Minimal.     SPECIMENS REMOVED: None.      COMPLICATIONS: None.     IMPLANTS: None.     INDICATIONS FOR PROCEDURE: This is a 60 yo diabetic female who   has a trigger thumb, which has not responded to   conservative treatment. She has elected for surgical management. We   discussed risks, benefits, potential complications and alternatives of   surgery, and She gave informed consent to proceed.     DESCRIPTION OF PROCEDURE: The patient was identified in the   preoperative holding area. Informed consent was obtained. The   operative site was marked. She was then transported to the OR and   placed supine on the operating table. All bony prominences were well-  padded. A tourniquet was applied to the upper brachium. After   induction of deep sedation, the planned surgical site was anesthetized   with 0.5% Marcaine and 1% lidocaine. The right upper extremity was   then sterilely prepped and draped in the usual fashion. Surgical time-  out was held. The operative site was confirmed. Preoperative   antibiotics were used. After Esmarch exsanguination, the   tourniquet was elevated to 250 mmHg. A transverse incision was made   in the palm over the A1 pulley. Blunt dissection was performed. The   radial and ulnar neurovascular bundles were protected throughout the   remainder of the case. The A1 pulley was incised. Complete release   was confirmed both proximally and distally. The digit was taken   through a full range of motion without triggering. The wound   was irrigated. The skin was closed with 4-0 nylon sutures. Sterile   dressings were applied. The tourniquet was let down and brisk cap   refill returned to the digits.  She was transferred to the PACU in stable   condition without complication.

## 2019-01-24 ENCOUNTER — OFFICE VISIT (OUTPATIENT)
Dept: INTERNAL MEDICINE CLINIC | Age: 64
End: 2019-01-24

## 2019-01-24 VITALS
RESPIRATION RATE: 16 BRPM | OXYGEN SATURATION: 92 % | DIASTOLIC BLOOD PRESSURE: 60 MMHG | HEART RATE: 106 BPM | BODY MASS INDEX: 36.23 KG/M2 | SYSTOLIC BLOOD PRESSURE: 146 MMHG | TEMPERATURE: 98.7 F | WEIGHT: 212.2 LBS | HEIGHT: 64 IN

## 2019-01-24 DIAGNOSIS — J20.9 ACUTE BRONCHITIS WITH BRONCHOSPASM: Primary | ICD-10-CM

## 2019-01-24 RX ORDER — BENZONATATE 200 MG/1
200 CAPSULE ORAL
Qty: 30 CAP | Refills: 0 | Status: SHIPPED | OUTPATIENT
Start: 2019-01-24 | End: 2019-12-26

## 2019-01-24 RX ORDER — DOXYCYCLINE 100 MG/1
100 CAPSULE ORAL 2 TIMES DAILY
Qty: 20 CAP | Refills: 0 | Status: SHIPPED | OUTPATIENT
Start: 2019-01-24 | End: 2019-02-03

## 2019-01-24 RX ORDER — METHYLPREDNISOLONE 4 MG/1
TABLET ORAL
Qty: 1 DOSE PACK | Refills: 0 | Status: SHIPPED | OUTPATIENT
Start: 2019-01-24 | End: 2019-04-26 | Stop reason: ALTCHOICE

## 2019-01-24 NOTE — PROGRESS NOTES
Chief Complaint   Patient presents with    Cough    Nasal Congestion     Patient states states she is here for cough and nasal congestion for the past 4 weeks. Patient states sputum is yellow in color and thick and nasal congestion is clear. [facility] stated [facility] and was [facility] Z-Bo about 2 weeks ago and patient states she is still not feeling any better. Patient states she is coughing a lot even at night.

## 2019-01-24 NOTE — PROGRESS NOTES
Subjective:   May Basurto is a 61 y.o. female who complains 4 weeks ago onset of cold symptoms and was seen and [facility]. She felt some initial improvement but over the past week he states has continued cough yellow sputum, wheezing and SOB. Notes nasal congestion clear drainage. She denies a history of fevers. Patient does not smoke cigarettes. Relevant PMH: No pertinent additional PMH. Current Outpatient Medications   Medication Sig Dispense Refill    simvastatin (ZOCOR) 40 mg tablet TAKE 1 TABLET BY MOUTH AT BEDTIME 90 Tab 0    glimepiride (AMARYL) 4 mg tablet TAKE 1 TABLET WITH BREAKFAST OR THE FIRST MAIN MEAL OF THE DAY ONCE A DAY  6    metFORMIN ER (GLUCOPHAGE XR) 500 mg tablet two tabs with breakfast and two tabs with dinner  6    HUMALOG KWIKPEN INSULIN 100 unit/mL kwikpen INJECT 5 UNITS PER 15 GRAMS OF CARBS UP  UNITS PER DAY SUBCUTANEOUSLY  2    NOHEMY PEN NEEDLE 32 gauge x 5/32\" ndle USE AS DIRECTED FOR INSULIN INJECTIONS 4 TIMES A DAY  3    naproxen sodium (ALEVE) 220 mg tablet Take 220 mg by mouth two (2) times daily (with meals).  TRESIBA FLEXTOUCH U-200 200 unit/mL (3 mL) inpn INJECT 75 UNITS DAILY  6    allopurinol (ZYLOPRIM) 300 mg tablet TAKE 1 TABLET BY MOUTH EVERY DAY 30 Tab 5    escitalopram oxalate (LEXAPRO) 20 mg tablet TAKE 1 TABLET BY MOUTH DAILY 30 Tab 5    valsartan-hydroCHLOROthiazide (DIOVAN-HCT) 80-12.5 mg per tablet TAKE 1 TABLET BY MOUTH EVERY DAY 30 Tab 5    NYSTOP powder APPLY TO AFFECTED AREA TWO (2) TIMES A DAY. AS NEEDED 60 g 1    BYDUREON 2 mg/0.65 mL pnij 2 mg by SubCUTAneous route every seven (7) days. 3    ibuprofen (ADVIL) 200 mg tablet Take  by mouth every six (6) hours as needed.  fenofibrate (LOFIBRA) 54 mg tablet Take  by mouth daily.  terbinafine HCl (LAMISIL) 1 % topical cream Apply  to affected area two (2) times a day.  (Patient taking differently: Apply  to affected area two (2) times daily as needed.) 30 g 0    glucose blood VI test strips (ASCENSIA CONTOUR) strip by Does Not Apply route See Admin Instructions. Boo contour test strips, tests 2-4 times per day. Ordered by Dr. Gina Starr.  Insulin Syringe-Needle U-100 (BD INSULIN SYRINGE ULT-FINE II) 1 mL 31 x 5/16\" syrg USE 4 TIMES A DAY AS DIRECTED 100 Syringe 5    aspirin 81 mg tablet Take 81 mg by mouth daily.  HYDROcodone-acetaminophen (NORCO) 5-325 mg per tablet Take 1 Tab by mouth every six (6) hours as needed for Pain. Max Daily Amount: 4 Tabs. 8 Tab 0     Allergies   Allergen Reactions    Adhesive Tape Contact Dermatitis     Blistering UNDER STERI-STRIPS    Contrast Agent [Iodine] Hives    Pcn [Penicillins] Hives    Levaquin [Levofloxacin] Nausea and Vomiting    Other Medication Hives and Swelling     CRAB        Review of Systems  Pertinent items are noted in HPI. Objective:     Visit Vitals  /60 (BP 1 Location: Left arm, BP Patient Position: Sitting)   Pulse (!) 106   Temp 98.7 °F (37.1 °C) (Oral)   Resp 16   Ht 5' 4\" (1.626 m)   Wt 212 lb 3.2 oz (96.3 kg)   SpO2 92%   BMI 36.42 kg/m²     General:  alert, cooperative, no distress   Eyes: negative   Ears: normal TM's and external ear canals AU   Sinuses:  Mild nasal congestion sinuses nontender. Mouth:  normal findings: oropharynx pink & moist without lesions or evidence of thrush   Neck: supple, symmetrical, trachea midline and no adenopathy. Heart: normal rate and regular rhythm. Lungs:  Few scattered wheezes. Improved post neb. Abdomen: soft, non-tender. Bowel sounds normal. No masses,  no organomegaly        Assessment/Plan:     Encounter Diagnoses   Name Primary?  Acute bronchitis with bronchospasm Yes   Treated with albuterol neb with improvement in symptoms. Plan:   Orders Placed This Encounter    doxycycline (MONODOX) 100 mg capsule    methylPREDNISolone (MEDROL DOSEPACK) 4 mg tablet    benzonatate (TESSALON) 200 mg capsule   . I have discussed the diagnosis with the patient and the intended plan as seen in the above orders. The patient has received an after-visit summary and questions were answered concerning future plans. I have discussed medication side effects and warnings with the patient as well. She has expressed understanding of the diagnosis and plan    Follow-up Disposition:  Return if symptoms worsen or fail to improve.

## 2019-03-01 ENCOUNTER — TELEPHONE (OUTPATIENT)
Dept: INTERNAL MEDICINE CLINIC | Age: 64
End: 2019-03-01

## 2019-03-01 NOTE — TELEPHONE ENCOUNTER
Notify patient due for follow-up appointment and lab work for her diabetes. Please schedule an appointment.

## 2019-03-18 ENCOUNTER — OFFICE VISIT (OUTPATIENT)
Dept: SLEEP MEDICINE | Age: 64
End: 2019-03-18

## 2019-03-18 VITALS
DIASTOLIC BLOOD PRESSURE: 78 MMHG | HEART RATE: 71 BPM | SYSTOLIC BLOOD PRESSURE: 117 MMHG | OXYGEN SATURATION: 96 % | HEIGHT: 64 IN | WEIGHT: 212 LBS | BODY MASS INDEX: 36.19 KG/M2

## 2019-03-18 DIAGNOSIS — G47.33 OSA (OBSTRUCTIVE SLEEP APNEA): Primary | ICD-10-CM

## 2019-03-18 NOTE — PATIENT INSTRUCTIONS

## 2019-03-18 NOTE — PROGRESS NOTES
[address] [place]. [place], [address]  Tel.  [phone]  Fax. [address] [facility] [address]  [place], [address] [facility]  Tel.  [phone]  Fax. [phone] [address] [facility] [address] [place] [address]  Tel.  [phone]  Fax. [phone]       Chief Complaint       Chief Complaint   Patient presents with    Sleep Problem     NP refd by Dr. Pato Bhardwaj for daytime fatigue       HPI      Eric Llanos is a  61 y.o.  female seen for evaluation of a sleep disorder  . She notes that she is \"exhausted all the time\". She believes that this is becoming more pronounced. In general, she retires at 8: 30 p.m. and awakens at 5 AM with an alarm. She is not fatigued on awakening but often will go back to sleep. She notes that sleep is often fragmented and that she is \"dreaming all the time. She may doze if seated and inactive such as watching TV or reading. She denies hypnagogic hallucinations or sleep paralysis, sleep talking or sleepwalking, bruxism or nocturnal incontinence, headache on awakening, abnormal arm or leg movements, cataplexy. The patient has not undergone diagnostic testing for the current problems.          [place] Sleepiness Score: 10       Allergies   Allergen Reactions    Adhesive Tape Contact Dermatitis     Blistering UNDER STERI-STRIPS    Contrast Agent [Iodine] Hives    Pcn [Penicillins] Hives    Levaquin [Levofloxacin] Nausea and Vomiting    Other Medication Hives and Swelling     CRAB       Current Outpatient Medications   Medication Sig Dispense Refill    allopurinol (ZYLOPRIM) 300 mg tablet TAKE 1 TABLET BY MOUTH EVERY DAY 90 Tab 0    escitalopram oxalate (LEXAPRO) 20 mg tablet TAKE 1 TABLET BY MOUTH DAILY 30 Tab 1    simvastatin (ZOCOR) 40 mg tablet TAKE 1 TABLET BY MOUTH AT BEDTIME 90 Tab 0    valsartan-hydroCHLOROthiazide (DIOVAN-HCT) 80-12.5 mg per tablet TAKE 1 TABLET BY MOUTH EVERY DAY 90 Tab 0    glimepiride (AMARYL) 4 mg tablet TAKE 1 TABLET WITH BREAKFAST OR THE FIRST MAIN MEAL OF THE DAY ONCE A DAY  6    metFORMIN ER (GLUCOPHAGE XR) 500 mg tablet two tabs with breakfast and two tabs with dinner  6    HUMALOG KWIKPEN INSULIN 100 unit/mL kwikpen INJECT 5 UNITS PER 15 GRAMS OF CARBS UP  UNITS PER DAY SUBCUTANEOUSLY  2    NOHEMY PEN NEEDLE 32 gauge x 5/32\" ndle USE AS DIRECTED FOR INSULIN INJECTIONS 4 TIMES A DAY  3    naproxen sodium (ALEVE) 220 mg tablet Take 220 mg by mouth two (2) times daily (with meals).  TRESIBA FLEXTOUCH U-200 200 unit/mL (3 mL) inpn INJECT 75 UNITS DAILY  6    ibuprofen (ADVIL) 200 mg tablet Take  by mouth every six (6) hours as needed.  fenofibrate (LOFIBRA) 54 mg tablet Take  by mouth daily.  glucose blood VI test strips ([employer] CONTOUR) strip by Does Not Apply route See Admin Instructions. Boo contour test strips, tests 2-4 times per day. Ordered by Dr. Vinicio Kelley.  Insulin Syringe-Needle U-100 (BD INSULIN SYRINGE ULT-FINE II) 1 mL 31 x 5/16\" syrg USE 4 TIMES A DAY AS DIRECTED 100 Syringe 5    aspirin 81 mg tablet Take 81 mg by mouth daily.  methylPREDNISolone (MEDROL DOSEPACK) 4 mg tablet 6 tablets day 1 then 5 tablets day 2 then 4 tablets day 3 then 3 tablets day 4 then 2 tablets day 5 then 1 tablet day 6 then stop. 1 Dose Pack 0    benzonatate (TESSALON) 200 mg capsule Take 1 Cap by mouth three (3) times daily as needed for Cough. 30 Cap 0    HYDROcodone-acetaminophen (NORCO) 5-325 mg per tablet Take 1 Tab by mouth every six (6) hours as needed for Pain. Max Daily Amount: 4 Tabs. 8 Tab 0    NYSTOP powder APPLY TO AFFECTED AREA TWO (2) TIMES A DAY. AS NEEDED 60 g 1    BYDUREON 2 mg/0.65 mL pnij 2 mg by SubCUTAneous route every seven (7) days. 3    terbinafine HCl (LAMISIL) 1 % topical cream Apply  to affected area two (2) times a day.  (Patient taking differently: Apply  to affected area two (2) times daily as needed.) 30 g 0        She  has a past medical history of Achilles tendonitis, Adverse effect of anesthesia, Allergic rhinitis, Calculus (2.28/11), Chronic pain, Color vision deficiency, Depression, Diabetes ([address] Hypercholesterolemia, Hypertension, Incisional hernia (10/17/2012), Pancreatitis (1969), Trigger thumb of left hand, and Unspecified essential hypertension (1/27/2010). She  has a past surgical history that includes hx other surgical (1996); pr cardiac surg procedure unlist (2002); hx ovarian cyst removal (3/2011); hx other surgical (08/04/2016); pr abdomen surgery proc unlisted (2014); hx hysterectomy (1982); hx oophorectomy (Bilateral, 3/11); hx gyn; hx gi; hx gi (05/29/15); hx gi (2014); hx gi ([id number]); hx hernia repair (1-14-16); hx hernia repair (2015); hx hernia repair (08/01/2016); hx orthopaedic (1982); hx heent (1988); hx heent (1993); hx heent; hx heent (7-2-15); hx heent (7-16-15); hx cataract removal (Bilateral, 2015); hx colonoscopy; and hx skin biopsy. She family history includes Cancer in her brother and father; Lung Disease in her mother; Other in her brother. She  reports that  has never smoked. she has never used smokeless tobacco. She reports that she does not drink alcohol or use drugs. Review of Systems:  Review of Systems   Constitutional: Negative for chills and fever. HENT: Negative for hearing loss and tinnitus. Eyes: Negative for blurred vision and double vision. Respiratory: Positive for cough and shortness of breath. Cardiovascular: Negative for chest pain and palpitations. Gastrointestinal: Positive for abdominal pain. Negative for heartburn. Genitourinary: Negative for frequency and urgency. Musculoskeletal: Positive for back pain and neck pain. Skin: Positive for itching. Negative for rash. Neurological: Negative for dizziness and headaches. Psychiatric/Behavioral: Positive for depression.          Objective:     Visit Vitals  /78 (BP 1 Location: Left arm, BP Patient Position: Sitting) Pulse 71   Ht 5' 4\" (1.626 m)   Wt 212 lb (96.2 kg)   SpO2 96%   BMI 36.39 kg/m²     Body mass index is 36.39 kg/m². General:   Conversant, cooperative   Eyes:  Pupils equal and reactive, no nystagmus   Oropharynx:   Mallampati score II,  tongue scalloped   Tonsils:      Neck:   No carotid bruits; Neck circ. in \"inches\": 15   Chest/Lungs:  Clear on auscultation    CVS:  Normal rate, regular rhythm   Skin:  Warm to touch; no obvious rashes   Neuro:  Speech fluent, face symmetrical, tongue movement normal   Psych:  Normal affect,  normal countenance        Assessment:       ICD-10-CM ICD-9-CM    1. APOLINAR (obstructive sleep apnea) G47.33 327.23    Potential sleep disordered breathing,    Plan:     No orders of the defined types were placed in this encounter. * Patient has a history and examination consistent with the diagnosis of sleep apnea. *Home sleep testing was ordered for initial evaluation. * She was provided information on sleep apnea including corresponding risk factors and the importance of proper treatment. * Treatment options if indicated were reviewed today. Instructions:  o The patient would benefit from weight reduction measures. o Do not engage in activities requiring a normal degree of alertness if fatigue is present. o The patient understands that untreated or undertreated sleep apnea could impair judgement and the ability to function normally during the day.  o Call or return if symptoms worsen or persist.          Martin Reno MD, FAASM  Electronically signed 03/18/19       This note was created using voice recognition software. Despite editing, there may be syntax errors. This note will not be viewable in [address]

## 2019-03-26 ENCOUNTER — HOSPITAL ENCOUNTER (OUTPATIENT)
Dept: SLEEP MEDICINE | Age: 64
Discharge: HOME | End: 2019-03-26
Payer: COMMERCIAL

## 2019-03-26 PROCEDURE — 95806 SLEEP STUDY UNATT&RESP EFFT: CPT | Performed by: SPECIALIST

## 2019-03-27 ENCOUNTER — DOCUMENTATION ONLY (OUTPATIENT)
Dept: SLEEP MEDICINE | Age: 64
End: 2019-03-27

## 2019-04-05 ENCOUNTER — TELEPHONE (OUTPATIENT)
Dept: SLEEP MEDICINE | Age: 64
End: 2019-04-05

## 2019-04-05 DIAGNOSIS — G47.33 OSA (OBSTRUCTIVE SLEEP APNEA): Primary | ICD-10-CM

## 2019-04-05 NOTE — TELEPHONE ENCOUNTER
HSAT Returned-[facility]    Date of Study: 3/26/19    The following information was gathered from the patients study log:    Further information provided: Log scanned into media.

## 2019-04-08 ENCOUNTER — DOCUMENTATION ONLY (OUTPATIENT)
Dept: SLEEP MEDICINE | Age: 64
End: 2019-04-08

## 2019-04-08 NOTE — TELEPHONE ENCOUNTER
HSAT demonstrated sleep disordered breathing characterized by an overall AHI of 17.5/h associated with minimal SaO2 77%. Events were more prominent supine with the supine-related AHI of 38.7/h. Snoring during 9.6% of the study. Recommendation: APAP 7-15 cm. Chief technologist: Please review the HSAT results with the patient. Order has been generated for APAP 7-15 cm. Please schedule first compliance appointment.

## 2019-04-08 NOTE — PROGRESS NOTES
This note is being routed to Terrance Walker MD    Sleep Medicine consult note and sleep study report in patient's chart for review.      Thank you for the referral.

## 2019-04-11 LAB
HBA1C MFR BLD HPLC: 12.2 %
LDL-C, EXTERNAL: 70

## 2019-04-12 LAB
CREATININE, EXTERNAL: 0.85
HBA1C MFR BLD HPLC: 12.2 %
LDL-C, EXTERNAL: 70

## 2019-04-12 NOTE — TELEPHONE ENCOUNTER
Provided patient with medical equipment info but patient was driving. She will call back if she hasn't heard from [facility] by 4/16/19. She states she will call back to schedule follow up visit when she gets set up on her machine.

## 2019-04-16 ENCOUNTER — APPOINTMENT (OUTPATIENT)
Dept: CT IMAGING | Age: 64
End: 2019-04-16
Attending: EMERGENCY MEDICINE
Payer: COMMERCIAL

## 2019-04-16 ENCOUNTER — OFFICE VISIT (OUTPATIENT)
Dept: INTERNAL MEDICINE CLINIC | Age: 64
End: 2019-04-16

## 2019-04-16 ENCOUNTER — HOSPITAL ENCOUNTER (EMERGENCY)
Age: 64
Discharge: HOME | End: 2019-04-16
Attending: EMERGENCY MEDICINE
Payer: COMMERCIAL

## 2019-04-16 VITALS
HEIGHT: 64 IN | SYSTOLIC BLOOD PRESSURE: 130 MMHG | DIASTOLIC BLOOD PRESSURE: 52 MMHG | WEIGHT: 204.2 LBS | HEART RATE: 72 BPM | TEMPERATURE: 100.1 F | RESPIRATION RATE: 20 BRPM | BODY MASS INDEX: 34.86 KG/M2 | OXYGEN SATURATION: 96 %

## 2019-04-16 VITALS
TEMPERATURE: 98.8 F | OXYGEN SATURATION: 98 % | HEIGHT: 64 IN | HEART RATE: 82 BPM | WEIGHT: 204.15 LBS | DIASTOLIC BLOOD PRESSURE: 62 MMHG | RESPIRATION RATE: 16 BRPM | SYSTOLIC BLOOD PRESSURE: 132 MMHG | BODY MASS INDEX: 34.85 KG/M2

## 2019-04-16 DIAGNOSIS — E11.9 TYPE 2 DIABETES MELLITUS WITHOUT COMPLICATION, WITHOUT LONG-TERM CURRENT USE OF INSULIN (HCC): ICD-10-CM

## 2019-04-16 DIAGNOSIS — R11.2 INTRACTABLE VOMITING WITH NAUSEA, UNSPECIFIED VOMITING TYPE: Primary | ICD-10-CM

## 2019-04-16 DIAGNOSIS — R91.8 MULTIPLE LUNG NODULES ON CT: ICD-10-CM

## 2019-04-16 DIAGNOSIS — E86.0 DEHYDRATION: ICD-10-CM

## 2019-04-16 DIAGNOSIS — R10.84 GENERALIZED ABDOMINAL PAIN: ICD-10-CM

## 2019-04-16 DIAGNOSIS — I10 ESSENTIAL HYPERTENSION: ICD-10-CM

## 2019-04-16 DIAGNOSIS — J18.9 PNEUMONIA OF RIGHT LOWER LOBE DUE TO INFECTIOUS ORGANISM: Primary | ICD-10-CM

## 2019-04-16 LAB
ALBUMIN SERPL-MCNC: 3.4 G/DL (ref 3.5–5)
ALBUMIN/GLOB SERPL: 0.9 {RATIO} (ref 1.1–2.2)
ALP SERPL-CCNC: 72 U/L (ref 45–117)
ALT SERPL-CCNC: 24 U/L (ref 12–78)
ANION GAP SERPL CALC-SCNC: 13 MMOL/L (ref 5–15)
APPEARANCE UR: CLEAR
AST SERPL-CCNC: 18 U/L (ref 15–37)
BACTERIA URNS QL MICRO: ABNORMAL /HPF
BASOPHILS # BLD: 0 K/UL (ref 0–0.1)
BASOPHILS NFR BLD: 0 % (ref 0–1)
BILIRUB SERPL-MCNC: 0.4 MG/DL (ref 0.2–1)
BILIRUB UR QL CFM: NEGATIVE
BUN SERPL-MCNC: 20 MG/DL (ref 6–20)
BUN/CREAT SERPL: 22 (ref 12–20)
CALCIUM SERPL-MCNC: 9.1 MG/DL (ref 8.5–10.1)
CHLORIDE SERPL-SCNC: 93 MMOL/L (ref 97–108)
CO2 SERPL-SCNC: 25 MMOL/L (ref 21–32)
COLOR UR: ABNORMAL
CREAT SERPL-MCNC: 0.89 MG/DL (ref 0.55–1.02)
DIFFERENTIAL METHOD BLD: ABNORMAL
EOSINOPHIL # BLD: 0 K/UL (ref 0–0.4)
EOSINOPHIL NFR BLD: 0 % (ref 0–7)
EPITH CASTS URNS QL MICRO: ABNORMAL /LPF
ERYTHROCYTE [DISTWIDTH] IN BLOOD BY AUTOMATED COUNT: 13.8 % (ref 11.5–14.5)
GLOBULIN SER CALC-MCNC: 4 G/DL (ref 2–4)
GLUCOSE BLD STRIP.AUTO-MCNC: 313 MG/DL (ref 65–100)
GLUCOSE SERPL-MCNC: 322 MG/DL (ref 65–100)
GLUCOSE UR STRIP.AUTO-MCNC: >1000 MG/DL
HCT VFR BLD AUTO: 42.4 % (ref 35–47)
HGB BLD-MCNC: 13.5 G/DL (ref 11.5–16)
HGB UR QL STRIP: ABNORMAL
IMM GRANULOCYTES # BLD AUTO: 0 K/UL (ref 0–0.04)
IMM GRANULOCYTES NFR BLD AUTO: 0 % (ref 0–0.5)
KETONES UR QL STRIP.AUTO: 15 MG/DL
LEUKOCYTE ESTERASE UR QL STRIP.AUTO: NEGATIVE
LIPASE SERPL-CCNC: 101 U/L (ref 73–393)
LYMPHOCYTES # BLD: 0.5 K/UL (ref 0.8–3.5)
LYMPHOCYTES NFR BLD: 13 % (ref 12–49)
MCH RBC QN AUTO: 26.9 PG (ref 26–34)
MCHC RBC AUTO-ENTMCNC: 31.8 G/DL (ref 30–36.5)
MCV RBC AUTO: 84.6 FL (ref 80–99)
MONOCYTES # BLD: 0.2 K/UL (ref 0–1)
MONOCYTES NFR BLD: 5 % (ref 5–13)
NEUTS SEG # BLD: 3.5 K/UL (ref 1.8–8)
NEUTS SEG NFR BLD: 82 % (ref 32–75)
NITRITE UR QL STRIP.AUTO: NEGATIVE
NRBC # BLD: 0 K/UL (ref 0–0.01)
NRBC BLD-RTO: 0 PER 100 WBC
PH UR STRIP: 5 [PH] (ref 5–8)
PLATELET # BLD AUTO: 217 K/UL (ref 150–400)
PMV BLD AUTO: 10.6 FL (ref 8.9–12.9)
POTASSIUM SERPL-SCNC: 3.8 MMOL/L (ref 3.5–5.1)
PROT SERPL-MCNC: 7.4 G/DL (ref 6.4–8.2)
PROT UR STRIP-MCNC: 100 MG/DL
RBC # BLD AUTO: 5.01 M/UL (ref 3.8–5.2)
RBC #/AREA URNS HPF: ABNORMAL /HPF (ref 0–5)
RBC MORPH BLD: ABNORMAL
SERVICE CMNT-IMP: ABNORMAL
SODIUM SERPL-SCNC: 131 MMOL/L (ref 136–145)
SP GR UR REFRACTOMETRY: >1.03 (ref 1–1.03)
TROPONIN I SERPL-MCNC: <0.05 NG/ML
UROBILINOGEN UR QL STRIP.AUTO: 0.2 EU/DL (ref 0.2–1)
WBC # BLD AUTO: 4.2 K/UL (ref 3.6–11)
WBC URNS QL MICRO: ABNORMAL /HPF (ref 0–4)

## 2019-04-16 PROCEDURE — 83690 ASSAY OF LIPASE: CPT

## 2019-04-16 PROCEDURE — 36415 COLL VENOUS BLD VENIPUNCTURE: CPT

## 2019-04-16 PROCEDURE — 93005 ELECTROCARDIOGRAM TRACING: CPT

## 2019-04-16 PROCEDURE — 74011250637 HC RX REV CODE- 250/637: Performed by: EMERGENCY MEDICINE

## 2019-04-16 PROCEDURE — 96361 HYDRATE IV INFUSION ADD-ON: CPT

## 2019-04-16 PROCEDURE — 99284 EMERGENCY DEPT VISIT MOD MDM: CPT

## 2019-04-16 PROCEDURE — 82962 GLUCOSE BLOOD TEST: CPT

## 2019-04-16 PROCEDURE — 74011000250 HC RX REV CODE- 250: Performed by: EMERGENCY MEDICINE

## 2019-04-16 PROCEDURE — 80053 COMPREHEN METABOLIC PANEL: CPT

## 2019-04-16 PROCEDURE — 74011250636 HC RX REV CODE- 250/636: Performed by: EMERGENCY MEDICINE

## 2019-04-16 PROCEDURE — 96375 TX/PRO/DX INJ NEW DRUG ADDON: CPT

## 2019-04-16 PROCEDURE — 74176 CT ABD & PELVIS W/O CONTRAST: CPT

## 2019-04-16 PROCEDURE — 84484 ASSAY OF TROPONIN QUANT: CPT

## 2019-04-16 PROCEDURE — 96374 THER/PROPH/DIAG INJ IV PUSH: CPT

## 2019-04-16 PROCEDURE — 81001 URINALYSIS AUTO W/SCOPE: CPT

## 2019-04-16 PROCEDURE — 85025 COMPLETE CBC W/AUTO DIFF WBC: CPT

## 2019-04-16 RX ORDER — ONDANSETRON 2 MG/ML
4 INJECTION INTRAMUSCULAR; INTRAVENOUS
Status: COMPLETED | OUTPATIENT
Start: 2019-04-16 | End: 2019-04-16

## 2019-04-16 RX ORDER — AZITHROMYCIN 250 MG/1
TABLET, FILM COATED ORAL
Qty: 6 TAB | Refills: 0 | Status: SHIPPED | OUTPATIENT
Start: 2019-04-16 | End: 2019-04-21

## 2019-04-16 RX ORDER — ONDANSETRON 4 MG/1
4 TABLET, ORALLY DISINTEGRATING ORAL
Qty: 12 TAB | Refills: 0 | Status: SHIPPED | OUTPATIENT
Start: 2019-04-16 | End: 2019-12-26

## 2019-04-16 RX ORDER — PROMETHAZINE HYDROCHLORIDE 25 MG/1
25 TABLET ORAL
Qty: 12 TAB | Refills: 0 | Status: SHIPPED | OUTPATIENT
Start: 2019-04-16 | End: 2019-12-26

## 2019-04-16 RX ADMIN — LEVOFLOXACIN 750 MG: 500 TABLET, FILM COATED ORAL at 15:24

## 2019-04-16 RX ADMIN — ONDANSETRON 4 MG: 2 INJECTION INTRAMUSCULAR; INTRAVENOUS at 13:47

## 2019-04-16 RX ADMIN — PROCHLORPERAZINE EDISYLATE 10 MG: 5 INJECTION INTRAMUSCULAR; INTRAVENOUS at 16:26

## 2019-04-16 RX ADMIN — FAMOTIDINE 20 MG: 10 INJECTION, SOLUTION INTRAVENOUS at 13:46

## 2019-04-16 RX ADMIN — SODIUM CHLORIDE 1000 ML: 900 INJECTION, SOLUTION INTRAVENOUS at 13:46

## 2019-04-16 NOTE — PROGRESS NOTES
Per verbal order from Dr. Felipe Hendrickson, patient is to go to [address].  This nurse called the ER dept. @ [phone] and spoke with Nurse Abhay Tatum. Patient is diabetic, fever, intractable vomiting & nausea, & dehydrated. Patient needs to be hydrated per Dr. Felipe Hendrickson. Mana Mckeon LPN

## 2019-04-16 NOTE — ED TRIAGE NOTES
Pt states she has had nausea and vomiting. Pt was seen by PCP and was referred to ER for rehydration and nausea medications.

## 2019-04-16 NOTE — DISCHARGE INSTRUCTIONS
Patient Education      PLEASE HAVE YOUR DOCTOR SCHEDULE AN OUTPATIENT DEDICATED CT THORAX AFTER YOUR PNEUMONIA HAS RESOLVED FOR FURTHER EVALUATION OF THE RIGHT LUNG PULMONARY NODULES. Pneumonia: Care Instructions  Your Care Instructions    Pneumonia is an infection of the lungs. Most cases are caused by infections from bacteria or viruses. Pneumonia may be mild or very severe. If it is caused by bacteria, you will be treated with antibiotics. It may take a few weeks to a few months to recover fully from pneumonia, depending on how sick you were and whether your overall health is good. Follow-up care is a key part of your treatment and safety. Be sure to make and go to all appointments, and call your doctor if you are having problems. It's also a good idea to know your test results and keep a list of the medicines you take. How can you care for yourself at home? · Take your antibiotics exactly as directed. Do not stop taking the medicine just because you are feeling better. You need to take the full course of antibiotics. · Take your medicines exactly as prescribed. Call your doctor if you think you are having a problem with your medicine. · Get plenty of rest and sleep. You may feel weak and tired for a while, but your energy level will improve with time. · To prevent dehydration, drink plenty of fluids, enough so that your urine is light yellow or clear like water. Choose water and other caffeine-free clear liquids until you feel better. If you have kidney, heart, or liver disease and have to limit fluids, talk with your doctor before you increase the amount of fluids you drink. · Take care of your cough so you can rest. A cough that brings up mucus from your lungs is common with pneumonia. It is one way your body gets rid of the infection. But if coughing keeps you from resting or causes severe fatigue and chest-wall pain, talk to your doctor.  He or she may suggest that you take a medicine to reduce the cough.  · Use a vaporizer or humidifier to add moisture to your bedroom. Follow the directions for cleaning the machine. · Do not smoke or allow others to smoke around you. Smoke will make your cough last longer. If you need help quitting, talk to your doctor about stop-smoking programs and medicines. These can increase your chances of quitting for good. · Take an over-the-counter pain medicine, such as acetaminophen (Tylenol), ibuprofen (Advil, Motrin), or naproxen (Aleve). Read and follow all instructions on the label. · Do not take two or more pain medicines at the same time unless the doctor told you to. Many pain medicines have acetaminophen, which is Tylenol. Too much acetaminophen (Tylenol) can be harmful. · If you were given a spirometer to measure how well your lungs are working, use it as instructed. This can help your doctor tell how your recovery is going. · To prevent pneumonia in the future, talk to your doctor about getting a flu vaccine (once a year) and a pneumococcal vaccine (one time only for most people). When should you call for help? Call 911 anytime you think you may need emergency care. For example, call if:    · You have severe trouble breathing.    Call your doctor now or seek immediate medical care if:    · You cough up dark brown or bloody mucus (sputum).     · You have new or worse trouble breathing.     · You are dizzy or lightheaded, or you feel like you may faint.    Watch closely for changes in your health, and be sure to contact your doctor if:    · You have a new or higher fever.     · You are coughing more deeply or more often.     · You are not getting better after 2 days (48 hours).     · You do not get better as expected. Where can you learn more? Go to http://grey-speedy.info/. Enter [id number] in the search box to learn more about \"Pneumonia: Care Instructions. \"  Current as of: September 5, 2018  Content Version: 11.9  © 5663-0084 Healthwise, Incorporated. Care instructions adapted under license by [employer] (which disclaims liability or warranty for this information). If you have questions about a medical condition or this instruction, always ask your healthcare professional. [address] any warranty or liability for your use of this information. Patient Education        Learning About Lung Nodules  What is a lung nodule? A lung nodule is a growth in the lung. A single nodule surrounded by lung tissue is called a solitary pulmonary nodule. A lung nodule might not cause any symptoms. Your doctor may have found one or more nodules on your lung when you were having a chest X-ray or CT scan. Or it may have been found during a lung cancer screening. A lung nodule may be caused by an old infection or cancer. It might also be a noncancerous growth. Lung nodules can cause a screening to give an abnormal result. Most nodules do not cause any harm. But without further tests, your doctor can't tell whether an abnormal finding is cancer, a harmless nodule, or something else. What can you expect when you have a lung nodule? Your doctor will look at several risk factors to see how likely it is that the nodule is cancer. He or she will look at:  · Whether you smoke or have ever smoked. · Your age and your family's medical history. · Whether you have ever had lung cancer. · The size and shape of the nodule. · Whether the nodule has changed in size. Your doctor may look at past chest X-rays or CT scans, if available, and compare them. Or you may have a series of CT scans to see if the nodule grows over time. What happens next depends on the risk of the nodule being cancer. · If you have no risk factors and the nodule is small, your doctor may advise doing nothing. · If the risk is small, your doctor may schedule follow-up appointments and tests. You may have more CT scans later to see if the nodule is growing.  If the nodule hasn't changed in 3 to 6 months, you may have CT scans every year. If it hasn't changed in 2 years, you may not need any more tests. · If there's a higher risk of cancer, your doctor may:  ? Do a PET scan, which may help tell if the nodule is cancerous or not. ? Take a sample of tissue from the nodule for testing. This is called a biopsy. ? Remove the nodule with surgery. Follow-up care is a key part of your treatment and safety. Be sure to make and go to all appointments, and call your doctor if you are having problems. It's also a good idea to know your test results and keep a list of the medicines you take. Where can you learn more? Go to http://grey-speedy.info/. Enter E876 in the search box to learn more about \"Learning About Lung Nodules. \"  Current as of: March 27, 2018  Content Version: 11.9  © 3151-9180 [employer]orporated. Care instructions adapted under license by [employer] (which disclaims liability or warranty for this information). If you have questions about a medical condition or this instruction, always ask your healthcare professional. [address] any warranty or liability for your use of this information.

## 2019-04-16 NOTE — PROGRESS NOTES
HPI:  Aracelis Oshea is a 61y.o. year old female who returns to clinic today for acute visit. She reports 5 days of N and V and some watery diarrhea and not taking medications. She states she had about 8 ounces of a diet Pepsi yesterday. Has had fever for the past 5 days and treating with tylenol. Notes upper abdominal pain. Feels very lightheaded and fatigued. No syncope. She drove herself to office. She is diabetic but has not been checking her blood sugars. She stopped all her medications except for Tylenol. Current Outpatient Medications   Medication Sig Dispense Refill    allopurinol (ZYLOPRIM) 300 mg tablet TAKE 1 TABLET BY MOUTH EVERY DAY 90 Tab 0    escitalopram oxalate (LEXAPRO) 20 mg tablet TAKE 1 TABLET BY MOUTH DAILY 30 Tab 1    simvastatin (ZOCOR) 40 mg tablet TAKE 1 TABLET BY MOUTH AT BEDTIME 90 Tab 0    valsartan-hydroCHLOROthiazide (DIOVAN-HCT) 80-12.5 mg per tablet TAKE 1 TABLET BY MOUTH EVERY DAY 90 Tab 0    methylPREDNISolone (MEDROL DOSEPACK) 4 mg tablet 6 tablets day 1 then 5 tablets day 2 then 4 tablets day 3 then 3 tablets day 4 then 2 tablets day 5 then 1 tablet day 6 then stop. 1 Dose Pack 0    benzonatate (TESSALON) 200 mg capsule Take 1 Cap by mouth three (3) times daily as needed for Cough. 30 Cap 0    HYDROcodone-acetaminophen (NORCO) 5-325 mg per tablet Take 1 Tab by mouth every six (6) hours as needed for Pain. Max Daily Amount: 4 Tabs. 8 Tab 0    glimepiride (AMARYL) 4 mg tablet TAKE 1 TABLET WITH BREAKFAST OR THE FIRST MAIN MEAL OF THE DAY ONCE A DAY  6    metFORMIN ER (GLUCOPHAGE XR) 500 mg tablet two tabs with breakfast and two tabs with dinner  6    HUMALOG KWIKPEN INSULIN 100 unit/mL kwikpen INJECT 5 UNITS PER 15 GRAMS OF CARBS UP  UNITS PER DAY SUBCUTANEOUSLY  2    NOHEMY PEN NEEDLE 32 gauge x 5/32\" ndle USE AS DIRECTED FOR INSULIN INJECTIONS 4 TIMES A DAY  3    naproxen sodium (ALEVE) 220 mg tablet Take 220 mg by mouth two (2) times daily (with meals).       TRESIBA FLEXTOUCH U-200 200 unit/mL (3 mL) inpn INJECT 75 UNITS DAILY  6    NYSTOP powder APPLY TO AFFECTED AREA TWO (2) TIMES A DAY. AS NEEDED 60 g 1    BYDUREON 2 mg/0.65 mL pnij 2 mg by SubCUTAneous route every seven (7) days. 3    ibuprofen (ADVIL) 200 mg tablet Take  by mouth every six (6) hours as needed.  fenofibrate (LOFIBRA) 54 mg tablet Take  by mouth daily.  terbinafine HCl (LAMISIL) 1 % topical cream Apply  to affected area two (2) times a day. (Patient taking differently: Apply  to affected area two (2) times daily as needed.) 30 g 0    glucose blood VI test strips ([employer] CONTOUR) strip by Does Not Apply route See Admin Instructions. [employer] contour test strips, tests 2-4 times per day. Ordered by Dr. Bret Duarte.  Insulin Syringe-Needle U-100 (BD INSULIN SYRINGE ULT-FINE II) 1 mL 31 x 5/16\" syrg USE 4 TIMES A DAY AS DIRECTED 100 Syringe 5    aspirin 81 mg tablet Take 81 mg by mouth daily.        Allergies   Allergen Reactions    Adhesive Tape Contact Dermatitis     Blistering UNDER STERI-STRIPS    Contrast Agent [Iodine] Hives    Pcn [Penicillins] Hives    Levaquin [Levofloxacin] Nausea and Vomiting    Other Medication Hives and Swelling     CRAB     Past Medical History:   Diagnosis Date    Adverse effect of anesthesia     woke during surgical procedure with MAC and cath    Allergic rhinitis     Calculus 2.28/11    EXCESS URIC ACID; HAS STONES CURRENTLY    Chronic pain     Color vision deficiency     Depression     Diabetes (HCC)     Hypercholesterolemia     Hypertension     Incisional hernia 10/17/2012    Pancreatitis 1969    CYST ON PANCREAS BURST BEFORE SURG FOR REMOVAL    Trigger thumb of left hand     and right hand      Past Surgical History:   Procedure Laterality Date    ABDOMEN SURGERY PROC UNLISTED  2014    hernia repair    CARDIAC SURG PROCEDURE UNLIST  2002    CARDIAC CATH FOR PALPITATIONS    HX CATARACT REMOVAL Bilateral 2015    [place] [facility] Dr. Marshel Mojica    HX COLONOSCOPY      HX GI      pancreatitis; CYST    HX GI  05/29/15    hernia repair, #2    HX GI  2014    HERNIA REPAIR #1    HX GI  [id number]    incisional hernia repair with component separation    HX GYN      5 dilation and curratages    HX HEENT  1988    T&A    HX HEENT  1993    SEPTOPLASTY    HX HEENT      LASER SURGERY BOTH EYES    HX HEENT  7-2-15    left catarac surgery    HX HEENT  7-16-15    right catarac surgery    HX HERNIA REPAIR  1-14-16    re[facility] ventral incisional hernia repair-[facility]-Dr. Nasra Martinez    HX HERNIA REPAIR  2015    HX HERNIA REPAIR  08/01/2016    hernia mesh repair    HX HYSTERECTOMY  1982    HX OOPHORECTOMY Bilateral 3/11    benign ovarian cyst    HX ORTHOPAEDIC  1982    TUMOR EXCISED L MIDDLE FINGER    HX OTHER SURGICAL  1996    MOLES EXCISED-FACE & BACK (WOKE DURING)    HX OTHER SURGICAL  08/04/2016    exploratory lap, DIONNE    HX OVARIAN CYST REMOVAL  3/2011    HX SKIN BIOPSY      moles on back (size of dime) and some on face removed      Family History   Problem Relation Age of Onset    Lung Disease Mother         copd    Cancer Father         LUNG, LIVER, skin    Other Brother         ACROMEGALY    Cancer Brother         melanoma    Anesth Problems Neg Hx       Social History     Tobacco Use    Smoking status: Never Smoker    Smokeless tobacco: Never Used   Substance Use Topics    Alcohol use: No         ROS  Physical Exam   HENT:   Head: Normocephalic and atraumatic. Nose: Nose normal.   OP clear mucous membranes dry. Cardiovascular: Normal rate, regular rhythm and intact distal pulses. Pulmonary/Chest: Effort normal and breath sounds normal.   Abdominal: Soft. Bowel sounds are normal. She exhibits no mass. There is tenderness (Mildly tender diffusely). There is no rebound and no guarding. Musculoskeletal: She exhibits no edema. Lymphadenopathy:     She has no cervical adenopathy. Neurological: She is alert.      Visit Vitals  /52 (BP 1 Location: Right arm, BP Patient Position: Sitting)   Pulse 72   Temp 100.1 °F (37.8 °C) (Oral)   Resp 20   Ht 5' 4\" (1.626 m)   Wt 204 lb 3.2 oz (92.6 kg)   SpO2 96%   BMI 35.05 kg/m²       Assessment & Plan:    ICD-10-CM ICD-9-CM    1. Intractable vomiting with nausea, unspecified vomiting type R11.2 536.2    2. Dehydration E86.0 276.51    3. Generalized abdominal pain R10.84 789.07    4. Type 2 diabetes mellitus without complication, without long-term current use of insulin (HCC) E11.9 250.00    5. Essential hypertension I10 401.9    refered to [facility] emergency room for urgent evaluation stat lab work and imaging, hydration and medication control of intractable nausea and vomiting. She is stable and  will drive her to ER. Follow-up in 1 week. Advised her to call back or return to office if symptoms worsen/change/persist.  Discussed expected course/resolution/complications of diagnosis in detail with patient. Medication risks/benefits/costs/interactions/alternatives discussed with patient. She was given an after visit summary which includes diagnoses, current medications, & vitals. She expressed understanding with the diagnosis and plan.

## 2019-04-16 NOTE — PROGRESS NOTES
Pt. Has N/V x5d and is not tolerating food or liquids, has held ALL medications except tylenol, last dose 7am but vomited right afterwards. Pt. Now has a cough.

## 2019-04-17 LAB
ATRIAL RATE: 74 BPM
CALCULATED P AXIS, ECG09: 77 DEGREES
CALCULATED R AXIS, ECG10: 8 DEGREES
CALCULATED T AXIS, ECG11: 77 DEGREES
DIAGNOSIS, 93000: NORMAL
P-R INTERVAL, ECG05: 160 MS
Q-T INTERVAL, ECG07: 412 MS
QRS DURATION, ECG06: 74 MS
QTC CALCULATION (BEZET), ECG08: 457 MS
VENTRICULAR RATE, ECG03: 74 BPM

## 2019-04-22 ENCOUNTER — DOCUMENTATION ONLY (OUTPATIENT)
Dept: SLEEP MEDICINE | Age: 64
End: 2019-04-22

## 2019-04-26 ENCOUNTER — OFFICE VISIT (OUTPATIENT)
Dept: INTERNAL MEDICINE CLINIC | Age: 64
End: 2019-04-26

## 2019-04-26 ENCOUNTER — TELEPHONE (OUTPATIENT)
Dept: SLEEP MEDICINE | Age: 64
End: 2019-04-26

## 2019-04-26 VITALS
TEMPERATURE: 98.3 F | RESPIRATION RATE: 18 BRPM | BODY MASS INDEX: 34.31 KG/M2 | SYSTOLIC BLOOD PRESSURE: 120 MMHG | OXYGEN SATURATION: 96 % | HEIGHT: 64 IN | HEART RATE: 82 BPM | DIASTOLIC BLOOD PRESSURE: 64 MMHG | WEIGHT: 201 LBS

## 2019-04-26 DIAGNOSIS — E11.9 TYPE 2 DIABETES MELLITUS WITHOUT COMPLICATION, WITHOUT LONG-TERM CURRENT USE OF INSULIN (HCC): ICD-10-CM

## 2019-04-26 DIAGNOSIS — F32.5 MAJOR DEPRESSIVE DISORDER WITH SINGLE EPISODE, IN FULL REMISSION (HCC): ICD-10-CM

## 2019-04-26 DIAGNOSIS — E78.5 HYPERLIPIDEMIA WITH TARGET LDL LESS THAN 100: ICD-10-CM

## 2019-04-26 DIAGNOSIS — E66.01 SEVERE OBESITY WITH BODY MASS INDEX (BMI) OF 35.0 TO 39.9 WITH SERIOUS COMORBIDITY (HCC): ICD-10-CM

## 2019-04-26 DIAGNOSIS — R91.1 LUNG NODULE: ICD-10-CM

## 2019-04-26 DIAGNOSIS — E87.1 HYPONATREMIA: ICD-10-CM

## 2019-04-26 DIAGNOSIS — I10 ESSENTIAL HYPERTENSION: ICD-10-CM

## 2019-04-26 DIAGNOSIS — J18.9 PNEUMONIA OF RIGHT LOWER LOBE DUE TO INFECTIOUS ORGANISM: Primary | ICD-10-CM

## 2019-04-26 NOTE — TELEPHONE ENCOUNTER
Phoned Dallas Reyes with [employer] to find out what additional information was needed in order for the patient to receive her device. LVM requesting a return call.

## 2019-04-26 NOTE — PROGRESS NOTES
HPI: 
Danitza is a 59y.o. year old female who returns to clinic today for follow up: Recent emergency room visit for gastroenteritis and follow-up of her routine medical problems. 1.  Recent gastroenteritis: She reports now feeling back to her usual self. Sodium level noted to be low. She was diagnosed with RLL pneumonia during her ER visit and was treated with azithromycin and has completed course. Abdominal CT showed new pulmonary nodules and repeat chest CT recommended after pneumonia resolved. 2.  Diabetes mellitus: BS ranging 200-300's. She reports was missing medication doses and not checking BS. Recent hgba1c 12.2 by her endocrinologist.  Lab work reviewed lipids and triglycerides also elevated. 3.  Sleep apnea just diagnosed and getting CPAP set up. 4.  Hypertension and hypercholesterolemia: Cardiovascular: She reports taking medications as instructed, no medication side effects noted, The home BP readings outside of office have been: not checking . Diet and Lifestyle: generally follows a low fat low cholesterol diet, generally follows a low sodium diet. Exercise: has started walking and gardening and chair yoga. 5.  Depression: Denies feeling sad and taking Lexapro daily. No suicidal thoughts. No side effects. Current Outpatient Medications Medication Sig Dispense Refill  allopurinol (ZYLOPRIM) 300 mg tablet TAKE 1 TABLET BY MOUTH EVERY DAY 90 Tab 0  
 escitalopram oxalate (LEXAPRO) 20 mg tablet TAKE 1 TABLET BY MOUTH DAILY 30 Tab 1  
 simvastatin (ZOCOR) 40 mg tablet TAKE 1 TABLET BY MOUTH AT BEDTIME 90 Tab 0  
 valsartan-hydroCHLOROthiazide (DIOVAN-HCT) 80-12.5 mg per tablet TAKE 1 TABLET BY MOUTH EVERY DAY 90 Tab 0  
 glimepiride (AMARYL) 4 mg tablet TAKE 1 TABLET WITH BREAKFAST OR THE FIRST MAIN MEAL OF THE DAY ONCE A DAY  6  
 metFORMIN ER (GLUCOPHAGE XR) 500 mg tablet two tabs with breakfast and two tabs with dinner  6  
  HUMALOG KWIKPEN INSULIN 100 unit/mL kwikpen INJECT 5 UNITS PER 15 GRAMS OF CARBS UP  UNITS PER DAY SUBCUTANEOUSLY  2  
 NOHEMY PEN NEEDLE 32 gauge x 5/32\" ndle USE AS DIRECTED FOR INSULIN INJECTIONS 4 TIMES A DAY  3  
 naproxen sodium (ALEVE) 220 mg tablet Take 220 mg by mouth two (2) times daily (with meals).  TRESIBA FLEXTOUCH U-200 200 unit/mL (3 mL) inpn INJECT 75 UNITS DAILY  6  
 NYSTOP powder APPLY TO AFFECTED AREA TWO (2) TIMES A DAY. AS NEEDED 60 g 1  
 ibuprofen (ADVIL) 200 mg tablet Take  by mouth every six (6) hours as needed.  fenofibrate (LOFIBRA) 54 mg tablet Take  by mouth daily.  terbinafine HCl (LAMISIL) 1 % topical cream Apply  to affected area two (2) times a day. (Patient taking differently: Apply  to affected area two (2) times daily as needed.) 30 g 0  
 glucose blood VI test strips ([employer] CONTOUR) strip by Does Not Apply route See Admin Instructions. [employer] contour test strips, tests 2-4 times per day. Ordered by Dr. Brendan Huynh.  Insulin Syringe-Needle U-100 (BD INSULIN SYRINGE ULT-FINE II) 1 mL 31 x 5/16\" syrg USE 4 TIMES A DAY AS DIRECTED 100 Syringe 5  
 aspirin 81 mg tablet Take 81 mg by mouth daily.  ondansetron (ZOFRAN ODT) 4 mg disintegrating tablet Take 1 Tab by mouth every eight (8) hours as needed for Nausea. 12 Tab 0  promethazine (PHENERGAN) 25 mg tablet Take 1 Tab by mouth every six (6) hours as needed for Nausea. 12 Tab 0  
 benzonatate (TESSALON) 200 mg capsule Take 1 Cap by mouth three (3) times daily as needed for Cough. 30 Cap 0  
 HYDROcodone-acetaminophen (NORCO) 5-325 mg per tablet Take 1 Tab by mouth every six (6) hours as needed for Pain. Max Daily Amount: 4 Tabs. 8 Tab 0  
 BYDUREON 2 mg/0.65 mL pnij 2 mg by SubCUTAneous route every seven (7) days. 3 Allergies Allergen Reactions  Adhesive Tape Contact Dermatitis Blistering UNDER STERI-STRIPS  Contrast Agent [Iodine] Hives  Pcn [Penicillins] Hives  Levaquin [Levofloxacin] Nausea and Vomiting  Shellfish Derived Hives and Swelling CRAB Social History Tobacco Use  Smoking status: Never Smoker  Smokeless tobacco: Never Used Substance Use Topics  Alcohol use: No  
   
 
Review of Systems Respiratory: Positive for cough (some residual cough with clear sputum). Negative for shortness of breath and wheezing. Cardiovascular: Negative for chest pain and leg swelling. Gastrointestinal: Negative for abdominal pain. Neurological: Negative for dizziness and headaches. Physical Exam  
Constitutional: She appears well-developed. No distress. HENT:  
Head: Normocephalic and atraumatic. Cardiovascular: Normal rate, regular rhythm and intact distal pulses. Pulmonary/Chest: Effort normal. She has no wheezes. Right basilar crackles noted. Abdominal: Soft. Bowel sounds are normal. She exhibits no distension and no mass. There is no tenderness. Musculoskeletal: She exhibits no edema. Psychiatric: She has a normal mood and affect. Nursing note and vitals reviewed. Visit Vitals /64 (BP 1 Location: Right arm, BP Patient Position: Sitting) Pulse 82 Temp 98.3 °F (36.8 °C) (Oral) Resp 18 Ht 5' 4\" (1.626 m) Wt 201 lb (91.2 kg) SpO2 96% BMI 34.50 kg/m² Assessment & Plan: ICD-10-CM ICD-9-CM 1. Pneumonia of right lower lobe due to infectious organism [facility]) resolving J18.1 486 CT CHEST WO CONT 2. Hyponatremia Recheck level L68.0 780.0 METABOLIC PANEL, BASIC 3. Type 2 diabetes mellitus without complication, without long-term current use of insulin ([address] Poorly controlled due to patient's noncompliance with medications and diet. She is working with her endocrinologist to improve this. Counseled regarding importance of trolling her diabetes and risk for endorgan damage if we are not controlling it. E11.9 250.00   
4.  Severe obesity with body mass index (BMI) of 35.0 to 39.9 with serious comorbidity ([facility] Counseled regarding elevated BMI and current weight goals. Reviewed weight loss strategies including dietary changes and exercise. E66.01 278.01   
5. Essential hypertension Well controlled, continue current medication. I10 401.9 6. Hyperlipidemia with target LDL less than 100  E78.5 272.4 7. Lung nodule Repeat chest CT in 5 weeks R91.1 793.11 CT CHEST WO CONT 8. Major depressive disorder with single episode, in full remission ([facility] Well-controlled continue current medication. F32.5 296.26 Follow-up in 2 weeks. Orders Placed This Encounter  CT CHEST WO CONT Standing Status:   Future Standing Expiration Date:   5/26/2020 Order Specific Question:   Is Patient Allergic to Contrast Dye? Answer: Yes  METABOLIC PANEL, BASIC  
 AMB EXT LDL-C This external order was created through the Results Console.  AMB EXT HGBA1C This external order was created through the Results Console. Advised her to call back or return to office if symptoms worsen/change/persist. 
Discussed expected course/resolution/complications of diagnosis in detail with patient. Medication risks/benefits/costs/interactions/alternatives discussed with patient. She was given an after visit summary which includes diagnoses, current medications, & vitals. She expressed understanding with the diagnosis and plan.

## 2019-04-26 NOTE — TELEPHONE ENCOUNTER
Spoke with James Mack and he informed us that the patients [facility]CP [facility] must document in her office visit the reason that he referred her for a sleep consult. Upon receiving that dictation [place] can provide the patient with her device. The patient is aware of the situation.

## 2019-04-26 NOTE — PROGRESS NOTES
Identified pt with two pt identifiers(name and ). Reviewed record in preparation for visit and have obtained necessary documentation. Chief Complaint Patient presents with  Medication Evaluation  
  follow up ,[facility] Follow Up Visit Vitals /64 (BP 1 Location: Right arm, BP Patient Position: Sitting) Pulse 82 Temp 98.3 °F (36.8 °C) (Oral) Resp 18 Ht 5' 4\" (1.626 m) Wt 201 lb (91.2 kg) SpO2 96% BMI 34.50 kg/m² Health Maintenance Due Topic  Shingrix Vaccine Age 50> (1 of 2)  Pneumococcal 0-64 years (1 of 1 - PPSV23)  BREAST CANCER SCRN MAMMOGRAM   
 MEDICARE YEARLY EXAM   
 HEMOGLOBIN A1C Q6M Coordination of Care Questionnaire: 
:  
1) Have you been to an emergency room, urgent care, or hospitalized since your last visit? If yes, where when, and reason for visit? no  
 
 
2. Have seen or consulted any other health care provider since your last visit? If yes, where when, and reason for visit? NO 
 
 
3) Do you have an Advanced Directive/ Living Will in place? YES If yes, do we have a copy on file YES If no, would you like information NO Patient is accompanied by self I have received verbal consent from Екатерина Anderson to discuss any/all medical information while they are present in the room.

## 2019-04-26 NOTE — TELEPHONE ENCOUNTER
Patient called stating she spoke to [facility] on 4/22/19 who said they are waiting on additional information from us. She is not sure what that is exactly. Advised patient that we would follow up with [facility] to look into this for her.

## 2019-04-27 LAB
BUN SERPL-MCNC: 21 MG/DL (ref 8–27)
BUN/CREAT SERPL: 27 (ref 12–28)
CALCIUM SERPL-MCNC: 9.8 MG/DL (ref 8.7–10.3)
CHLORIDE SERPL-SCNC: 98 MMOL/L (ref 96–106)
CO2 SERPL-SCNC: 27 MMOL/L (ref 20–29)
CREAT SERPL-MCNC: 0.78 MG/DL (ref 0.57–1)
GLUCOSE SERPL-MCNC: 222 MG/DL (ref 65–99)
POTASSIUM SERPL-SCNC: 4.8 MMOL/L (ref 3.5–5.2)
SODIUM SERPL-SCNC: 140 MMOL/L (ref 134–144)

## 2019-05-17 ENCOUNTER — HOSPITAL ENCOUNTER (OUTPATIENT)
Dept: CT IMAGING | Age: 64
Discharge: HOME | End: 2019-05-17
Attending: INTERNAL MEDICINE
Payer: COMMERCIAL

## 2019-05-17 DIAGNOSIS — J18.9 PNEUMONIA OF RIGHT LOWER LOBE DUE TO INFECTIOUS ORGANISM: ICD-10-CM

## 2019-05-17 DIAGNOSIS — R91.1 LUNG NODULE: ICD-10-CM

## 2019-05-17 PROCEDURE — 71250 CT THORAX DX C-: CPT

## 2019-05-22 NOTE — PROGRESS NOTES
[employer] message sent. Right lower lobe pneumonia changes nearly resolved. Right lung nodules 5 mm or less and will plan to repeat chest CT in 12 months. She has upcoming follow-up on her pneumonia on May 28, 2019.

## 2019-05-28 ENCOUNTER — OFFICE VISIT (OUTPATIENT)
Dept: INTERNAL MEDICINE CLINIC | Age: 64
End: 2019-05-28

## 2019-05-28 VITALS
BODY MASS INDEX: 36.23 KG/M2 | RESPIRATION RATE: 16 BRPM | WEIGHT: 212.2 LBS | DIASTOLIC BLOOD PRESSURE: 62 MMHG | HEART RATE: 76 BPM | SYSTOLIC BLOOD PRESSURE: 138 MMHG | OXYGEN SATURATION: 97 % | HEIGHT: 64 IN | TEMPERATURE: 98.5 F

## 2019-05-28 DIAGNOSIS — J18.9 PNEUMONIA OF RIGHT LOWER LOBE DUE TO INFECTIOUS ORGANISM: Primary | ICD-10-CM

## 2019-05-28 DIAGNOSIS — R91.1 LUNG NODULE: ICD-10-CM

## 2019-05-28 DIAGNOSIS — Z79.4 TYPE 2 DIABETES MELLITUS WITH MICROALBUMINURIA, WITH LONG-TERM CURRENT USE OF INSULIN (HCC): ICD-10-CM

## 2019-05-28 DIAGNOSIS — F33.42 DEPRESSION, MAJOR, RECURRENT, IN COMPLETE REMISSION (HCC): ICD-10-CM

## 2019-05-28 DIAGNOSIS — R80.9 TYPE 2 DIABETES MELLITUS WITH MICROALBUMINURIA, WITH LONG-TERM CURRENT USE OF INSULIN (HCC): ICD-10-CM

## 2019-05-28 DIAGNOSIS — J30.1 SEASONAL ALLERGIC RHINITIS DUE TO POLLEN: ICD-10-CM

## 2019-05-28 DIAGNOSIS — E11.29 TYPE 2 DIABETES MELLITUS WITH MICROALBUMINURIA, WITH LONG-TERM CURRENT USE OF INSULIN (HCC): ICD-10-CM

## 2019-05-28 PROBLEM — F32.A MILD DEPRESSION: Status: RESOLVED | Noted: 2018-10-12 | Resolved: 2019-05-28

## 2019-05-28 RX ORDER — MINERAL OIL
180 ENEMA (ML) RECTAL DAILY
Qty: 30 TAB | Refills: 11 | COMMUNITY
End: 2020-10-20

## 2019-05-28 RX ORDER — ESCITALOPRAM OXALATE 10 MG/1
10 TABLET ORAL DAILY
Qty: 30 TAB | Refills: 5 | Status: SHIPPED | OUTPATIENT
Start: 2019-05-28 | End: 2019-10-30 | Stop reason: SDUPTHER

## 2019-05-28 NOTE — PROGRESS NOTES
HPI:  Najma Herrera is a 59y.o. year old female who returns to clinic today for follow up: pneumonia and chest nodule. Reviewed follow up chest CT with near residual resolution of her pneumonia. Nodule unchanged. Cough from pneumonia seems to have resolved. She notes some post nasal drip and throat clearing and some dry cough occasionally with this. Depression: has been very well controlled and she would like to decrease her lexapro. No suicidal ideation. .   Diabetes: working with endocrine and restarted her medications. BS running 190 over the past week. Low BS 51 yesterday. Sees endocrine end of June and labs planned at that time. Current Outpatient Medications   Medication Sig Dispense Refill    allopurinol (ZYLOPRIM) 300 mg tablet TAKE 1 TABLET BY MOUTH EVERY DAY 90 Tab 0    escitalopram oxalate (LEXAPRO) 20 mg tablet TAKE 1 TABLET BY MOUTH DAILY 30 Tab 1    simvastatin (ZOCOR) 40 mg tablet TAKE 1 TABLET BY MOUTH AT BEDTIME 90 Tab 0    valsartan-hydroCHLOROthiazide (DIOVAN-HCT) 80-12.5 mg per tablet TAKE 1 TABLET BY MOUTH EVERY DAY 90 Tab 0    glimepiride (AMARYL) 4 mg tablet TAKE 1 TABLET WITH BREAKFAST OR THE FIRST MAIN MEAL OF THE DAY ONCE A DAY  6    metFORMIN ER (GLUCOPHAGE XR) 500 mg tablet two tabs with breakfast and two tabs with dinner  6    HUMALOG KWIKPEN INSULIN 100 unit/mL kwikpen INJECT 5 UNITS PER 15 GRAMS OF CARBS UP  UNITS PER DAY SUBCUTANEOUSLY  2    NOHEMY PEN NEEDLE 32 gauge x 5/32\" ndle USE AS DIRECTED FOR INSULIN INJECTIONS 4 TIMES A DAY  3    TRESIBA FLEXTOUCH U-200 200 unit/mL (3 mL) inpn INJECT 75 UNITS DAILY  6    BYDUREON 2 mg/0.65 mL pnij 2 mg by SubCUTAneous route every seven (7) days. 3    fenofibrate (LOFIBRA) 54 mg tablet Take  by mouth daily.  glucose blood VI test strips ([employer] CONTOUR) strip by Does Not Apply route See Admin Instructions. [employer] contour test strips, tests 2-4 times per day. Ordered by Dr. Amado Burt.       Insulin Syringe-Needle U-100 (BD INSULIN SYRINGE ULT-FINE II) 1 mL 31 x 5/16\" syrg USE 4 TIMES A DAY AS DIRECTED 100 Syringe 5    aspirin 81 mg tablet Take 81 mg by mouth daily.  ondansetron (ZOFRAN ODT) 4 mg disintegrating tablet Take 1 Tab by mouth every eight (8) hours as needed for Nausea. 12 Tab 0    promethazine (PHENERGAN) 25 mg tablet Take 1 Tab by mouth every six (6) hours as needed for Nausea. 12 Tab 0    benzonatate (TESSALON) 200 mg capsule Take 1 Cap by mouth three (3) times daily as needed for Cough. 30 Cap 0    HYDROcodone-acetaminophen (NORCO) 5-325 mg per tablet Take 1 Tab by mouth every six (6) hours as needed for Pain. Max Daily Amount: 4 Tabs. 8 Tab 0    naproxen sodium (ALEVE) 220 mg tablet Take 220 mg by mouth two (2) times daily (with meals).  NYSTOP powder APPLY TO AFFECTED AREA TWO (2) TIMES A DAY. AS NEEDED 60 g 1    ibuprofen (ADVIL) 200 mg tablet Take  by mouth every six (6) hours as needed.  terbinafine HCl (LAMISIL) 1 % topical cream Apply  to affected area two (2) times a day. (Patient taking differently: Apply  to affected area two (2) times daily as needed.) 30 g 0     Allergies   Allergen Reactions    Adhesive Tape Contact Dermatitis     Blistering UNDER STERI-STRIPS    Contrast Agent [Iodine] Hives    Pcn [Penicillins] Hives    Levaquin [Levofloxacin] Nausea and Vomiting    Shellfish Derived Hives and Swelling     CRAB     Social History     Tobacco Use    Smoking status: Never Smoker    Smokeless tobacco: Never Used   Substance Use Topics    Alcohol use: No         Review of Systems   Constitutional: Negative for fever. Respiratory: Negative for shortness of breath and wheezing. Cardiovascular: Negative for chest pain. Gastrointestinal: Negative for abdominal pain. Psychiatric/Behavioral: Negative for depression and suicidal ideas. Physical Exam   Constitutional: She appears well-developed. No distress. HENT:   Head: Normocephalic and atraumatic.    Mouth/Throat: Oropharynx is clear and moist.   Mild nasal congestion with clear drainage present. Cardiovascular: Normal rate, regular rhythm and intact distal pulses. Pulmonary/Chest: Effort normal and breath sounds normal. She has no wheezes. She has no rales. Abdominal: Soft. Bowel sounds are normal. She exhibits no distension and no mass. There is no tenderness. Musculoskeletal: She exhibits no edema. Psychiatric: She has a normal mood and affect. Nursing note and vitals reviewed. Visit Vitals  /62 (BP 1 Location: Right arm)   Pulse 76   Temp 98.5 °F (36.9 °C) (Oral)   Resp 16   Ht 5' 4\" (1.626 m)   Wt 212 lb 3.2 oz (96.3 kg)   SpO2 97%   BMI 36.42 kg/m²       Assessment & Plan:    ICD-10-CM ICD-9-CM    1. Pneumonia of right lower lobe due to infectious organism [facility])  Clinically resolved and you complete resolution on repeat chest CT. Will repeat chest x-ray and 2 weeks to confirm clearance. J18.1 486 XR CHEST PA LAT   2. Type 2 diabetes mellitus with microalbuminuria, with long-term current use of insulin ([facility])  Continue with current regimen and working with endocrinologist.  Continue current medications and diabetic diet. Monitor for low blood sugars. E11.29 250.40     R80.9 791.0     Z79.4 V58.67    3. Depression, major, recurrent, in complete remission ([facility]  Well-controlled and will decrease Lexapro to 10 mg daily. F33.42 296.36    4. Seasonal allergic rhinitis due to pollen  Start Allegra daily as needed for allergies. J30.1 477.0    5. Lung nodules 5 mm or less  Unchanged repeat chest CT one year. R91.1 793.11    Follow-up in 3 months or earlier if any problems. Orders Placed This Encounter    XR CHEST PA LAT    fexofenadine (ALLEGRA) 180 mg tablet    escitalopram oxalate (LEXAPRO) 10 mg tablet            Advised her to call back or return to office if symptoms worsen/change/persist.  Discussed expected course/resolution/complications of diagnosis in detail with patient.     Medication risks/benefits/costs/interactions/alternatives discussed with patient. She was given an after visit summary which includes diagnoses, current medications, & vitals. She expressed understanding with the diagnosis and plan.

## 2019-06-28 ENCOUNTER — HOSPITAL ENCOUNTER (OUTPATIENT)
Dept: GENERAL RADIOLOGY | Age: 64
Discharge: HOME | End: 2019-06-28
Attending: INTERNAL MEDICINE
Payer: COMMERCIAL

## 2019-06-28 DIAGNOSIS — J18.9 PNEUMONIA OF RIGHT LOWER LOBE DUE TO INFECTIOUS ORGANISM: ICD-10-CM

## 2019-06-28 PROCEDURE — 71046 X-RAY EXAM CHEST 2 VIEWS: CPT

## 2019-07-09 RX ORDER — ALLOPURINOL 300 MG/1
TABLET ORAL
Qty: 30 TAB | Refills: 2 | Status: SHIPPED | OUTPATIENT
Start: 2019-07-09 | End: 2019-10-30 | Stop reason: SDUPTHER

## 2019-07-29 ENCOUNTER — OFFICE VISIT (OUTPATIENT)
Dept: SLEEP MEDICINE | Age: 64
End: 2019-07-29

## 2019-07-29 VITALS
BODY MASS INDEX: 35.85 KG/M2 | HEART RATE: 87 BPM | HEIGHT: 64 IN | OXYGEN SATURATION: 96 % | WEIGHT: 210 LBS | SYSTOLIC BLOOD PRESSURE: 129 MMHG | DIASTOLIC BLOOD PRESSURE: 65 MMHG

## 2019-07-29 DIAGNOSIS — G47.33 OSA (OBSTRUCTIVE SLEEP APNEA): Primary | ICD-10-CM

## 2019-07-29 NOTE — PATIENT INSTRUCTIONS

## 2019-07-29 NOTE — PROGRESS NOTES
[address] [place], [address]  Tel.  [phone]  Fax. [address] [facility] [address]  [place], [address] [facility]  Tel.  [phone]  Fax. [phone] [address] [place] [address]  Tel.  [phone]  Fax. [phone]         Chief Complaint       Chief Complaint   Patient presents with    Sleep Problem     1st adherence         HPI        Denzel Castellanos is a 59 y.o. female seen for follow-up. She was diagnosed with sleep disordered breathing; HSAT demonstrated overall AHI of 17.5/h associated with minimal SaO2 77%. Events were more prominent supine with the supine-related AHI of 38.7/h. Snoring during 9.6% of the study. APAP 7-15 cm was initiated. Compliance data downloaded and reviewed in detail with the patient today. During the past 30 days, APAP used during 30 days with the average daily use of 6.15 hours. CMS compliance  criteria 100 %. AHI 0.8 per hour. She notes she is not experiencing nonrestorative sleep or daytime fatigue. Allergies   Allergen Reactions    Adhesive Tape Contact Dermatitis     Blistering UNDER STERI-STRIPS    Contrast Agent [Iodine] Hives    Pcn [Penicillins] Hives    Levaquin [Levofloxacin] Nausea and Vomiting    Shellfish Derived Hives and Swelling     CRAB       Current Outpatient Medications   Medication Sig Dispense Refill    allopurinol (ZYLOPRIM) 300 mg tablet TAKE 1 TABLET BY MOUTH EVERY DAY 30 Tab 2    fexofenadine (ALLEGRA) 180 mg tablet Take 1 Tab by mouth daily. As needed for allergy 30 Tab 11    escitalopram oxalate (LEXAPRO) 10 mg tablet Take 1 Tab by mouth daily. 30 Tab 5    ondansetron (ZOFRAN ODT) 4 mg disintegrating tablet Take 1 Tab by mouth every eight (8) hours as needed for Nausea. 12 Tab 0    promethazine (PHENERGAN) 25 mg tablet Take 1 Tab by mouth every six (6) hours as needed for Nausea.  12 Tab 0    simvastatin (ZOCOR) 40 mg tablet TAKE 1 TABLET BY MOUTH AT BEDTIME 90 Tab 0    valsartan-hydroCHLOROthiazide (DIOVAN-HCT) 80-12.5 mg per tablet TAKE 1 TABLET BY MOUTH EVERY DAY 90 Tab 0    benzonatate (TESSALON) 200 mg capsule Take 1 Cap by mouth three (3) times daily as needed for Cough. 30 Cap 0    HYDROcodone-acetaminophen (NORCO) 5-325 mg per tablet Take 1 Tab by mouth every six (6) hours as needed for Pain. Max Daily Amount: 4 Tabs. 8 Tab 0    glimepiride (AMARYL) 4 mg tablet TAKE 1 TABLET WITH BREAKFAST OR THE FIRST MAIN MEAL OF THE DAY ONCE A DAY  6    metFORMIN ER (GLUCOPHAGE XR) 500 mg tablet two tabs with breakfast and two tabs with dinner  6    HUMALOG KWIKPEN INSULIN 100 unit/mL kwikpen INJECT 5 UNITS PER 15 GRAMS OF CARBS UP  UNITS PER DAY SUBCUTANEOUSLY  2    NOHEMY PEN NEEDLE 32 gauge x 5/32\" ndle USE AS DIRECTED FOR INSULIN INJECTIONS 4 TIMES A DAY  3    naproxen sodium (ALEVE) 220 mg tablet Take 220 mg by mouth two (2) times daily (with meals).  TRESIBA FLEXTOUCH U-200 200 unit/mL (3 mL) inpn INJECT 75 UNITS DAILY  6    NYSTOP powder APPLY TO AFFECTED AREA TWO (2) TIMES A DAY. AS NEEDED 60 g 1    BYDUREON 2 mg/0.65 mL pnij 2 mg by SubCUTAneous route every seven (7) days. 3    ibuprofen (ADVIL) 200 mg tablet Take  by mouth every six (6) hours as needed.  fenofibrate (LOFIBRA) 54 mg tablet Take  by mouth daily.  terbinafine HCl (LAMISIL) 1 % topical cream Apply  to affected area two (2) times a day. (Patient taking differently: Apply  to affected area two (2) times daily as needed.) 30 g 0    glucose blood VI test strips ([employer] CONTOUR) strip by Does Not Apply route See Admin Instructions. [employer] contour test strips, tests 2-4 times per day. Ordered by Dr. Marah Crespo.  Insulin Syringe-Needle U-100 (BD INSULIN SYRINGE ULT-FINE II) 1 mL 31 x 5/16\" syrg USE 4 TIMES A DAY AS DIRECTED 100 Syringe 5    aspirin 81 mg tablet Take 81 mg by mouth daily.           She  has a past medical history of Adverse effect of anesthesia, Allergic rhinitis, Calculus (2.28/11), Chronic pain, Color vision deficiency, Depression, Diabetes ([facility] Hypercholesterolemia, Hypertension, Incisional hernia (10/17/2012), Pancreatitis (1969), and Trigger thumb of left hand. She  has a past surgical history that includes hx other surgical (1996); pr cardiac surg procedure unlist (2002); hx ovarian cyst removal (3/2011); hx other surgical (08/04/2016); pr abdomen surgery proc unlisted (2014); hx hysterectomy (1982); hx oophorectomy (Bilateral, 3/11); hx gyn; hx gi; hx gi (05/29/15); hx gi (2014); hx gi ([id number]); hx hernia repair (1-14-16); hx hernia repair (2015); hx hernia repair (08/01/2016); hx orthopaedic (1982); hx heent (1988); hx heent (1993); hx heent; hx heent (7-2-15); hx heent (7-16-15); hx cataract removal (Bilateral, 2015); hx colonoscopy; and hx skin biopsy. She family history includes Cancer in her brother and father; Lung Disease in her mother; Other in her brother. She  reports that she has never smoked. She has never used smokeless tobacco. She reports that she does not drink alcohol or use drugs. Review of Systems:  Unchanged per patient      Objective:     Visit Vitals  /65 (BP 1 Location: Left arm, BP Patient Position: Sitting)   Pulse 87   Ht 5' 4\" (1.626 m)   Wt 210 lb (95.3 kg)   SpO2 96%   BMI 36.05 kg/m²     Body mass index is 36.05 kg/m². Assessment:       ICD-10-CM ICD-9-CM    1. APOLINAR (obstructive sleep apnea) G47.33 327.23      Sleep disordered breathing more evident supine responding well to APAP at 7-15 cm. She will continue at the current pressure settings. She will contact the office for specific problems. she is compliant with PAP therapy and PAP continues to benefit patient and remains necessary for control of her sleep apnea. Plan:   No orders of the defined types were placed in this encounter. * Patient has a history and examination consistent with the diagnosis of sleep apnea.   * She was provided information on sleep apnea including corresponding risk factors and the importance of proper treatment. * Treatment options if indicated were reviewed today. Potential benefit of weight reduction was discussed. Weight reduction techniques reviewed. Amanda Ugarte MD, FAASM  Electronically signed 07/29/19        This note was created using voice recognition software. Despite editing, there may be syntax errors. This note will not be viewable in [address]

## 2019-10-02 RX ORDER — SIMVASTATIN 40 MG/1
TABLET, FILM COATED ORAL
Qty: 30 TAB | Refills: 2 | Status: SHIPPED | OUTPATIENT
Start: 2019-10-02 | End: 2019-12-26 | Stop reason: SDUPTHER

## 2019-10-18 RX ORDER — VALSARTAN AND HYDROCHLOROTHIAZIDE 80; 12.5 MG/1; MG/1
TABLET, FILM COATED ORAL
Qty: 30 TAB | Refills: 2 | Status: SHIPPED | OUTPATIENT
Start: 2019-10-18 | End: 2019-11-21 | Stop reason: SDUPTHER

## 2019-10-31 RX ORDER — SIMVASTATIN 40 MG/1
TABLET, FILM COATED ORAL
Qty: 30 TAB | Refills: 2 | OUTPATIENT
Start: 2019-10-31

## 2019-10-31 RX ORDER — ALLOPURINOL 300 MG/1
TABLET ORAL
Qty: 30 TAB | Refills: 2 | Status: SHIPPED | OUTPATIENT
Start: 2019-10-31 | End: 2019-11-15 | Stop reason: SDUPTHER

## 2019-10-31 RX ORDER — ESCITALOPRAM OXALATE 10 MG/1
10 TABLET ORAL DAILY
Qty: 30 TAB | Refills: 2 | Status: SHIPPED | OUTPATIENT
Start: 2019-10-31 | End: 2019-12-26 | Stop reason: SDUPTHER

## 2019-11-14 RX ORDER — ALLOPURINOL 300 MG/1
TABLET ORAL
Qty: 30 TAB | Refills: 2 | OUTPATIENT
Start: 2019-11-14

## 2019-11-15 ENCOUNTER — TELEPHONE (OUTPATIENT)
Dept: INTERNAL MEDICINE CLINIC | Age: 64
End: 2019-11-15

## 2019-11-15 RX ORDER — ALLOPURINOL 300 MG/1
TABLET ORAL
Qty: 90 TAB | Refills: 0 | Status: SHIPPED | OUTPATIENT
Start: 2019-11-15 | End: 2019-12-26 | Stop reason: SDUPTHER

## 2019-11-21 RX ORDER — VALSARTAN AND HYDROCHLOROTHIAZIDE 80; 12.5 MG/1; MG/1
TABLET, FILM COATED ORAL
Qty: 30 TAB | Refills: 0 | Status: SHIPPED | OUTPATIENT
Start: 2019-11-21 | End: 2020-01-02

## 2019-12-04 LAB
CREATININE, EXTERNAL: 0.79
HBA1C MFR BLD HPLC: 11.7 %
LDL-C, EXTERNAL: 72
MICROALBUMIN UR TEST STR-MCNC: 98.8 MG/DL

## 2019-12-26 ENCOUNTER — OFFICE VISIT (OUTPATIENT)
Dept: INTERNAL MEDICINE CLINIC | Age: 64
End: 2019-12-26

## 2019-12-26 VITALS
HEART RATE: 70 BPM | HEIGHT: 64 IN | BODY MASS INDEX: 35.34 KG/M2 | TEMPERATURE: 97.7 F | RESPIRATION RATE: 18 BRPM | OXYGEN SATURATION: 99 % | DIASTOLIC BLOOD PRESSURE: 82 MMHG | WEIGHT: 207 LBS | SYSTOLIC BLOOD PRESSURE: 136 MMHG

## 2019-12-26 DIAGNOSIS — J30.1 SEASONAL ALLERGIC RHINITIS DUE TO POLLEN: ICD-10-CM

## 2019-12-26 DIAGNOSIS — Z79.4 TYPE 2 DIABETES MELLITUS WITH MICROALBUMINURIA, WITH LONG-TERM CURRENT USE OF INSULIN (HCC): Primary | ICD-10-CM

## 2019-12-26 DIAGNOSIS — E11.29 TYPE 2 DIABETES MELLITUS WITH MICROALBUMINURIA, WITH LONG-TERM CURRENT USE OF INSULIN (HCC): Primary | ICD-10-CM

## 2019-12-26 DIAGNOSIS — R80.9 TYPE 2 DIABETES MELLITUS WITH MICROALBUMINURIA, WITH LONG-TERM CURRENT USE OF INSULIN (HCC): Primary | ICD-10-CM

## 2019-12-26 DIAGNOSIS — E78.5 HYPERLIPIDEMIA WITH TARGET LDL LESS THAN 100: ICD-10-CM

## 2019-12-26 DIAGNOSIS — F33.42 DEPRESSION, MAJOR, RECURRENT, IN COMPLETE REMISSION (HCC): ICD-10-CM

## 2019-12-26 DIAGNOSIS — R10.10 UPPER ABDOMINAL PAIN: ICD-10-CM

## 2019-12-26 DIAGNOSIS — I10 ESSENTIAL HYPERTENSION: ICD-10-CM

## 2019-12-26 DIAGNOSIS — E66.9 OBESITY (BMI 30-39.9): ICD-10-CM

## 2019-12-26 RX ORDER — OMEPRAZOLE 20 MG/1
20 CAPSULE, DELAYED RELEASE ORAL DAILY
Qty: 30 CAP | Refills: 1 | Status: SHIPPED | OUTPATIENT
Start: 2019-12-26 | End: 2020-01-17

## 2019-12-26 RX ORDER — FENOFIBRATE 54 MG/1
54 TABLET ORAL DAILY
Qty: 90 TAB | Refills: 1 | Status: SHIPPED | OUTPATIENT
Start: 2019-12-26 | End: 2020-12-23

## 2019-12-26 RX ORDER — ESCITALOPRAM OXALATE 10 MG/1
10 TABLET ORAL DAILY
Qty: 90 TAB | Refills: 1 | Status: SHIPPED | OUTPATIENT
Start: 2019-12-26 | End: 2020-04-21 | Stop reason: SDUPTHER

## 2019-12-26 RX ORDER — ALLOPURINOL 300 MG/1
TABLET ORAL
Qty: 90 TAB | Refills: 0 | Status: SHIPPED | OUTPATIENT
Start: 2019-12-26 | End: 2020-05-15

## 2019-12-26 RX ORDER — SIMVASTATIN 40 MG/1
TABLET, FILM COATED ORAL
Qty: 90 TAB | Refills: 1 | Status: SHIPPED | OUTPATIENT
Start: 2019-12-26 | End: 2020-12-29

## 2019-12-26 NOTE — PROGRESS NOTES
HPI:  Reshma Catalan is a 59y.o. year old female who returns to clinic today for follow up: AODM, HTN, hypercholesterolemia. 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 1. Endocrine Review  Since last visit: followed by Dr Elena Ward endocrine and recent hgba1c 6 and for the 3 months prior to this hemoglobin A1c she had not been taking her medications for her diabetes and also not following diabetic diet. For the past week has been taking meds and following better diabetic diet. Home glucose still running high .    2. Cardiovascular: She did not take her blood pressure medication this morning. No medication side effects noted, The home BP readings outside of office: not checking.  Diet and Lifestyle: Not eating as healthy recently. Exercise: has stopped going to the gym but is doing some walking  3. Depression: Carleen Lucas feels more down around [place] missing her  brother but feeling better and denies depression today. No suicidal ideation. . Treatment includes Lexapro and no other therapies. Ongoing symptoms include none. Current Outpatient Medications   Medication Sig Dispense Refill    valsartan-hydroCHLOROthiazide (DIOVAN-HCT) 80-12.5 mg per tablet TAKE 1 TABLET BY MOUTH EVERY DAY 30 Tab 0    allopurinol (ZYLOPRIM) 300 mg tablet TAKE 1 TABLET BY MOUTH EVERY DAY 90 Tab 0    escitalopram oxalate (LEXAPRO) 10 mg tablet Take 1 Tab by mouth daily. 30 Tab 2    simvastatin (ZOCOR) 40 mg tablet TAKE 1 TABLET BY MOUTH AT BEDTIME 30 Tab 2    fexofenadine (ALLEGRA) 180 mg tablet Take 1 Tab by mouth daily.  As needed for allergy 30 Tab 11    glimepiride (AMARYL) 4 mg tablet TAKE 1 TABLET WITH BREAKFAST OR THE FIRST MAIN MEAL OF THE DAY ONCE A DAY  6    metFORMIN ER (GLUCOPHAGE XR) 500 mg tablet two tabs with breakfast and two tabs with dinner  6    HUMALOG KWIKPEN INSULIN 100 unit/mL kwikpen INJECT 5 UNITS PER 15 GRAMS OF CARBS UP  UNITS PER DAY SUBCUTANEOUSLY  2    NOHEMY PEN NEEDLE 32 gauge x \" ndle USE AS DIRECTED FOR INSULIN INJECTIONS 4 TIMES A DAY  3    TRESIBA FLEXTOUCH U-200 200 unit/mL (3 mL) inpn 80 Units. 6    BYDUREON 2 mg/0.65 mL pnij 2 mg by SubCUTAneous route every seven (7) days. 3    fenofibrate (LOFIBRA) 54 mg tablet Take  by mouth daily.  glucose blood VI test strips (ASCENSIA CONTOUR) strip by Does Not Apply route See Admin Instructions. Boo contour test strips, tests 2-4 times per day. Ordered by Dr. Machelle Emerson.  Insulin Syringe-Needle U-100 (BD INSULIN SYRINGE ULT-FINE II) 1 mL 31 x 5/16\" syrg USE 4 TIMES A DAY AS DIRECTED 100 Syringe 5    aspirin 81 mg tablet Take 81 mg by mouth daily.  naproxen sodium (ALEVE) 220 mg tablet Take 220 mg by mouth two (2) times daily (with meals).  NYSTOP powder APPLY TO AFFECTED AREA TWO (2) TIMES A DAY. AS NEEDED 60 g 1    ibuprofen (ADVIL) 200 mg tablet Take  by mouth every six (6) hours as needed.  terbinafine HCl (LAMISIL) 1 % topical cream Apply  to affected area two (2) times a day. (Patient taking differently: Apply  to affected area two (2) times daily as needed.) 30 g 0     Allergies   Allergen Reactions    Adhesive Tape Contact Dermatitis     Blistering UNDER STERI-STRIPS    Contrast Agent [Iodine] Hives    Pcn [Penicillins] Hives    Levaquin [Levofloxacin] Nausea and Vomiting    Shellfish Derived Hives and Swelling     CRAB     Social History     Tobacco Use    Smoking status: Never Smoker    Smokeless tobacco: Never Used   Substance Use Topics    Alcohol use: No         Review of Systems   Respiratory: Negative for shortness of breath. Cardiovascular: Negative for chest pain and leg swelling. Gastrointestinal: Positive for abdominal pain (mid epigastric and sometimes right upper and has not tried acid blocker. has had multiple abdominal hernias with mesh revisions. ). Negative for heartburn. Physical Exam  Vitals signs and nursing note reviewed.    Constitutional:       General: She is not in acute distress. Appearance: She is well-developed. HENT:      Head: Normocephalic and atraumatic. Cardiovascular:      Rate and Rhythm: Normal rate and regular rhythm. Pulmonary:      Effort: Pulmonary effort is normal.      Breath sounds: Normal breath sounds. No wheezing. Abdominal:      General: Bowel sounds are normal. There is no distension. Palpations: Abdomen is soft. There is no mass. Tenderness: There is tenderness in the epigastric area. Comments: No abdominal hernia noted. Monofilament intact bilaterally. Pulses 1+ bilaterally. No skin lesions or open wounds. Visit Vitals  /82   Pulse 70   Temp 97.7 °F (36.5 °C) (Oral)   Resp 18   Ht 5' 4\" (1.626 m)   Wt 207 lb (93.9 kg)   SpO2 99%   BMI 35.53 kg/m²       Assessment & Plan:    ICD-10-CM ICD-9-CM    1. Type 2 diabetes mellitus with microalbuminuria, with long-term current use of insulin ([facility])  Very poorly controlled and counseled the importance of taking her medication and following a diabetic diet. We reviewed end-stage organ damage that can occur from out-of-control diabetes. She already has microalbuminuria. She states her understanding and is planning to do better with her lifestyle and her medications. E11.29 250.40     R80.9 791.0     Z79.4 V58.67    2. Essential hypertension  Borderline but she did not take her blood pressures this morning. Counseled to send blood pressures for review in 2 weeks. Encouraged her to take her medications daily. I10 401.9    3. Hyperlipidemia with target LDL less than 100  At goal continue current medication. E78.5 272.4    4. Depression, major, recurrent, in complete remission ([facility] [address] [address]  Some mood swings with the [place] holidays but overall has been controlled. Continue with current medication. No suicidal ideation. F33.42 296.36    5. Seasonal allergic rhinitis due to pollen  Stable J30.1 477.0    6. Obesity (BMI 30-39. 9)  Counseled regarding elevated BMI and current weight goals.  Reviewed weight loss strategies including dietary changes and exercise. E66.9 278.00    7. Upper abdominal pain  Reviewed abdominal CT from earlier this year which was unremarkable that was done for the same pain. Will start Prilosec daily and refer to GI for further evaluation. Suspect pain may be related to the mesh she has from her repeated abdominal hernia repairs. Counseled importance of weight loss to decrease risk for further abdominal hernia. R10.10 789.09 REFERRAL TO GASTROENTEROLOGY      Orders Placed This Encounter    AMB EXT LDL-C    AMB EXT CREATININE    AMB EXT HGBA1C    AMB EXT URINE MICROALBUMIN    REFERRAL TO GASTROENTEROLOGY    simvastatin (ZOCOR) 40 mg tablet    fenofibrate (LOFIBRA) 54 mg tablet    escitalopram oxalate (LEXAPRO) 10 mg tablet    allopurinol (ZYLOPRIM) 300 mg tablet    omeprazole (PRILOSEC) 20 mg capsule    follow up 3 months. Advised her to call back or return to office if symptoms worsen/change/persist.  Discussed expected course/resolution/complications of diagnosis in detail with patient. Medication risks/benefits/costs/interactions/alternatives discussed with patient. She was given an after visit summary which includes diagnoses, current medications, & vitals. She expressed understanding with the diagnosis and plan.

## 2020-01-02 RX ORDER — VALSARTAN AND HYDROCHLOROTHIAZIDE 80; 12.5 MG/1; MG/1
TABLET, FILM COATED ORAL
Qty: 30 TAB | Refills: 2 | Status: SHIPPED | OUTPATIENT
Start: 2020-01-02 | End: 2020-05-08

## 2020-01-08 ENCOUNTER — DOCUMENTATION ONLY (OUTPATIENT)
Dept: INTERNAL MEDICINE CLINIC | Age: 65
End: 2020-01-08

## 2020-02-12 ENCOUNTER — ANESTHESIA EVENT (OUTPATIENT)
Dept: ENDOSCOPY | Age: 65
End: 2020-02-12
Payer: COMMERCIAL

## 2020-02-12 ENCOUNTER — HOSPITAL ENCOUNTER (OUTPATIENT)
Age: 65
Setting detail: OUTPATIENT SURGERY
Discharge: HOME | End: 2020-02-12
Attending: INTERNAL MEDICINE | Admitting: INTERNAL MEDICINE
Payer: COMMERCIAL

## 2020-02-12 ENCOUNTER — ANESTHESIA (OUTPATIENT)
Dept: ENDOSCOPY | Age: 65
End: 2020-02-12
Payer: COMMERCIAL

## 2020-02-12 VITALS
HEIGHT: 64 IN | TEMPERATURE: 98.1 F | OXYGEN SATURATION: 98 % | WEIGHT: 205 LBS | SYSTOLIC BLOOD PRESSURE: 147 MMHG | BODY MASS INDEX: 35 KG/M2 | HEART RATE: 70 BPM | DIASTOLIC BLOOD PRESSURE: 80 MMHG | RESPIRATION RATE: 11 BRPM

## 2020-02-12 LAB
GLUCOSE BLD STRIP.AUTO-MCNC: 200 MG/DL (ref 65–100)
SERVICE CMNT-IMP: ABNORMAL

## 2020-02-12 PROCEDURE — 76040000019: Performed by: INTERNAL MEDICINE

## 2020-02-12 PROCEDURE — 77030019988 HC FCPS ENDOSC DISP BSC -B: Performed by: INTERNAL MEDICINE

## 2020-02-12 PROCEDURE — 88305 TISSUE EXAM BY PATHOLOGIST: CPT

## 2020-02-12 PROCEDURE — 82962 GLUCOSE BLOOD TEST: CPT

## 2020-02-12 PROCEDURE — 74011250636 HC RX REV CODE- 250/636: Performed by: INTERNAL MEDICINE

## 2020-02-12 PROCEDURE — 74011250636 HC RX REV CODE- 250/636: Performed by: ANESTHESIOLOGY

## 2020-02-12 PROCEDURE — 76060000031 HC ANESTHESIA FIRST 0.5 HR: Performed by: INTERNAL MEDICINE

## 2020-02-12 PROCEDURE — 74011000250 HC RX REV CODE- 250: Performed by: ANESTHESIOLOGY

## 2020-02-12 RX ORDER — NALOXONE HYDROCHLORIDE 0.4 MG/ML
0.4 INJECTION, SOLUTION INTRAMUSCULAR; INTRAVENOUS; SUBCUTANEOUS
Status: DISCONTINUED | OUTPATIENT
Start: 2020-02-12 | End: 2020-02-12 | Stop reason: HOSPADM

## 2020-02-12 RX ORDER — PROPOFOL 10 MG/ML
INJECTION, EMULSION INTRAVENOUS AS NEEDED
Status: DISCONTINUED | OUTPATIENT
Start: 2020-02-12 | End: 2020-02-12 | Stop reason: HOSPADM

## 2020-02-12 RX ORDER — LIDOCAINE HYDROCHLORIDE 20 MG/ML
INJECTION, SOLUTION EPIDURAL; INFILTRATION; INTRACAUDAL; PERINEURAL AS NEEDED
Status: DISCONTINUED | OUTPATIENT
Start: 2020-02-12 | End: 2020-02-12 | Stop reason: HOSPADM

## 2020-02-12 RX ORDER — ATROPINE SULFATE 0.1 MG/ML
0.5 INJECTION INTRAVENOUS
Status: DISCONTINUED | OUTPATIENT
Start: 2020-02-12 | End: 2020-02-12 | Stop reason: HOSPADM

## 2020-02-12 RX ORDER — SODIUM CHLORIDE 0.9 % (FLUSH) 0.9 %
5-40 SYRINGE (ML) INJECTION EVERY 8 HOURS
Status: DISCONTINUED | OUTPATIENT
Start: 2020-02-12 | End: 2020-02-12 | Stop reason: HOSPADM

## 2020-02-12 RX ORDER — EPINEPHRINE 0.1 MG/ML
1 INJECTION INTRACARDIAC; INTRAVENOUS
Status: DISCONTINUED | OUTPATIENT
Start: 2020-02-12 | End: 2020-02-12 | Stop reason: HOSPADM

## 2020-02-12 RX ORDER — FLUMAZENIL 0.1 MG/ML
0.2 INJECTION INTRAVENOUS
Status: DISCONTINUED | OUTPATIENT
Start: 2020-02-12 | End: 2020-02-12 | Stop reason: HOSPADM

## 2020-02-12 RX ORDER — DEXTROMETHORPHAN/PSEUDOEPHED 2.5-7.5/.8
1.2 DROPS ORAL
Status: DISCONTINUED | OUTPATIENT
Start: 2020-02-12 | End: 2020-02-12 | Stop reason: HOSPADM

## 2020-02-12 RX ORDER — SODIUM CHLORIDE 0.9 % (FLUSH) 0.9 %
5-40 SYRINGE (ML) INJECTION AS NEEDED
Status: DISCONTINUED | OUTPATIENT
Start: 2020-02-12 | End: 2020-02-12 | Stop reason: HOSPADM

## 2020-02-12 RX ORDER — SODIUM CHLORIDE 9 MG/ML
75 INJECTION, SOLUTION INTRAVENOUS CONTINUOUS
Status: DISCONTINUED | OUTPATIENT
Start: 2020-02-12 | End: 2020-02-12 | Stop reason: HOSPADM

## 2020-02-12 RX ADMIN — LIDOCAINE HYDROCHLORIDE 100 MG: 20 INJECTION, SOLUTION EPIDURAL; INFILTRATION; INTRACAUDAL; PERINEURAL at 12:14

## 2020-02-12 RX ADMIN — PROPOFOL 280 MG: 10 INJECTION, EMULSION INTRAVENOUS at 12:26

## 2020-02-12 RX ADMIN — SODIUM CHLORIDE 75 ML/HR: 900 INJECTION, SOLUTION INTRAVENOUS at 11:34

## 2020-02-12 NOTE — DISCHARGE INSTRUCTIONS
[facility]  [id number]  1955    EGD DISCHARGE INSTRUCTIONS  Discomfort:  Sore throat- throat lozenges or warm salt water gargle  redness at IV site- apply warm compress to area; if redness or soreness persist- contact your physician  Gaseous discomfort- walking, belching will help relieve any discomfort  You may not operate a vehicle for 12 hours  You may not engage in an occupation involving machinery or appliances for rest of today  You may not drink alcoholic beverages for at least 12 hours  Avoid making any critical decisions for at least 24 hour  DIET  You may resume your regular diet - however -  remember your colon is empty and a heavy meal will produce gas. Avoid these foods:  vegetables, fried / greasy foods, carbonated drinks    MEDICATIONS:  Per Medication Reconciliation      ACTIVITY  You may resume your normal daily activities until tomorrow AM;  Spend the remainder of the day resting -  avoid any strenuous activity. CALL M.D. ANY SIGN OF   Increasing pain, nausea, vomiting  Abdominal distension (swelling)  New increased bleeding (oral or rectal)  Fever (chills)  Pain in chest area  Bloody discharge from nose or mouth  Shortness of breath    You may take any Advil, Aspirin, Ibuprofen, Motrin, Aleve, or Goodys for 10 days, ONLY  Tylenol as needed for pain. IMPRESSION:  Impression:    1. Normal proximal, mid, and distal esophagus  2. Moderate, patchy, non-erosive gastropathy with heme in body and stomach. Biopsied  3. Stomach otherwise normal, including retroflexion  4. Normal duodenal bulb and 2nd portion of the duodenum. Biopsied    Recommendations:  1.  Follow up pathology    Follow-up Instructions:   Call Dr. Adam Lara for the results of procedure / biopsy in 7-10 days   Telephone #[phone]    Malcolm Kelley MD

## 2020-02-12 NOTE — ANESTHESIA PREPROCEDURE EVALUATION
Anesthetic History   No history of anesthetic complications            Review of Systems / Medical History  Patient summary reviewed, nursing notes reviewed and pertinent labs reviewed    Pulmonary  Within defined limits                 Neuro/Psych         Psychiatric history    Comments: Depression Cardiovascular    Hypertension          Hyperlipidemia    Exercise tolerance: >4 METS     GI/Hepatic/Renal         Renal disease: stones      Comments: Abdominal Pain Endo/Other    Diabetes: poorly controlled, type 2, using insulin    Obesity  Pertinent negatives: No morbid obesity   Other Findings              Physical Exam    Airway  Mallampati: I  TM Distance: > 6 cm  Neck ROM: normal range of motion   Mouth opening: Normal     Cardiovascular    Rhythm: regular  Rate: normal         Dental  No notable dental hx       Pulmonary  Breath sounds clear to auscultation               Abdominal         Other Findings            Anesthetic Plan    ASA: 3  Anesthesia type: general and total IV anesthesia          Induction: Intravenous  Anesthetic plan and risks discussed with: Patient

## 2020-02-12 NOTE — ANESTHESIA POSTPROCEDURE EVALUATION
Procedure(s):  ESOPHAGOGASTRODUODENOSCOPY (EGD)  ESOPHAGOGASTRODUODENAL (EGD) BIOPSY. total IV anesthesia    Anesthesia Post Evaluation        Patient location during evaluation: PACU  Note status: Adequate. Level of consciousness: responsive to verbal stimuli and sleepy but conscious  Pain management: satisfactory to patient  Airway patency: patent  Anesthetic complications: no  Cardiovascular status: acceptable  Respiratory status: acceptable  Hydration status: acceptable  Comments: +Post-Anesthesia Evaluation and Assessment    Patient: Shawn Gurrola MRN: [id number]  SSN: [id number]   YOB: 1955  Age: 59 y.o. Sex: female      Cardiovascular Function/Vital Signs    /69   Pulse 71   Temp 36.8 °C (98.2 °F)   Resp 15   Ht 5' 4\" (1.626 m)   Wt 93 kg (205 lb)   SpO2 99%   BMI 35.19 kg/m²     Patient is status post Procedure(s):  ESOPHAGOGASTRODUODENOSCOPY (EGD)  ESOPHAGOGASTRODUODENAL (EGD) BIOPSY. Nausea/Vomiting: Controlled. Postoperative hydration reviewed and adequate. Pain:  Pain Scale 1: Numeric (0 - 10) (02/12/20 1231)  Pain Intensity 1: 0 (02/12/20 1231)   Managed. Neurological Status: At baseline. Mental Status and Level of Consciousness: Arousable. Pulmonary Status:   O2 Device: CPAP nasal (02/12/20 1231)   Adequate oxygenation and airway patent. Complications related to anesthesia: None    Post-anesthesia assessment completed. No concerns. Signed By: Jassi Perdomo MD    2/12/2020  Post anesthesia nausea and vomiting:  controlled      Vitals Value Taken Time   BP 0/0 2/12/2020 12:40 PM   Temp     Pulse 70 2/12/2020 12:42 PM   Resp 9 2/12/2020 12:42 PM   SpO2 97 % 2/12/2020 12:42 PM   Vitals shown include unvalidated device data.

## 2020-02-12 NOTE — PROGRESS NOTES
[facility]  1955  [id number]    Situation:  Verbal report received from: Malena Clemons RN  Procedure: Procedure(s):  ESOPHAGOGASTRODUODENOSCOPY (EGD)  ESOPHAGOGASTRODUODENAL (EGD) BIOPSY    Background:    Preoperative diagnosis: abdominal pain  Postoperative diagnosis: gastritis    :  Dr. Adam Lara  Assistant(s): Endoscopy RN-1: Massiel Dickey    Specimens:   ID Type Source Tests Collected by Time Destination   1 : duodenum biopsy for pathology Preservative Duodenum  Nayeli Posadas MD 2/12/2020 1219 Pathology   2 : gastric biopsy for pathology Preservative Gastric  Nayeli Posadas MD 2/12/2020 1221 Pathology     H. Pylori  no    Assessment:  Intra-procedure medications     Anesthesia gave intra-procedure sedation and medications, see anesthesia flow sheet yes    Intravenous fluids: NS@ KVO     Vital signs stable      Abdominal assessment: round and soft      Recommendation:  Discharge patient per MD order .   Family or Friend    Permission to share finding with family or friend yes

## 2020-02-12 NOTE — PROCEDURES
NAME:  Kourtney Hutchison   :   1955   MRN:   [id number]     Date/Time:  2020 12:23 PM    Esophagogastroduodenoscopy (EGD) Procedure Note    Procedure: Esophagogastroduodenoscopy with biopsy    Indication:  Abdominal pain, epigastric  Pre-operative Diagnosis: see indication above  Post-operative Diagnosis: see findings below  :  Cory Webber MD  Referring Provider:   --Castillo Pike MD    Exam:  Airway: clear, no airway problems anticipated  Heart: RRR, without gallops or rubs  Lungs: clear bilaterally without wheezes, crackles, or rhonchi  Abdomen: soft, nontender, nondistended, bowel sounds present  Mental Status: awake, alert and oriented to person, place and time     Anethesia/Sedation:  MAC anesthesia Propofol  Procedure Details   After informed consent was obtained for the procedure, with all risks and benefits of procedure explained the patient was taken to the endoscopy suite and placed in the left lateral decubitus position. Following sequential administration of sedation as per above, the [id number] gastroscope was inserted into the mouth and advanced under direct vision to second portion of the duodenum. A careful inspection was made as the gastroscope was withdrawn, including a retroflexed view of the proximal stomach; findings and interventions are described below. Findings:  1. Normal proximal, mid, and distal esophagus  2. Moderate, patchy, non-erosive gastropathy with heme in body and stomach. Biopsied  3. Stomach otherwise normal, including retroflexion  4. Normal duodenal bulb and 2nd portion of the duodenum. Biopsied    Therapies:  1. Biopsies    Specimens: 1. Duodenal 2. Gastric    EBL:  None. Complications:   None; patient tolerated the procedure well. Impression:    1. Normal proximal, mid, and distal esophagus  2. Moderate, patchy, non-erosive gastropathy with heme in body and stomach. Biopsied  3.  Stomach otherwise normal, including retroflexion  4. Normal duodenal bulb and 2nd portion of the duodenum. Biopsied    Recommendations:  1.  Follow up pathology    Discharge disposition:  Home in the company of  when able to ambulate    Everette Parsons MD

## 2020-04-21 RX ORDER — ESCITALOPRAM OXALATE 10 MG/1
10 TABLET ORAL DAILY
Qty: 90 TAB | Refills: 0 | Status: SHIPPED | OUTPATIENT
Start: 2020-04-21 | End: 2020-10-20 | Stop reason: SDUPTHER

## 2020-04-29 ENCOUNTER — PATIENT MESSAGE (OUTPATIENT)
Dept: INTERNAL MEDICINE CLINIC | Age: 65
End: 2020-04-29

## 2020-04-29 ENCOUNTER — TELEPHONE (OUTPATIENT)
Dept: INTERNAL MEDICINE CLINIC | Age: 65
End: 2020-04-29

## 2020-04-29 RX ORDER — ESCITALOPRAM OXALATE 10 MG/1
10 TABLET ORAL DAILY
Qty: 90 TAB | Refills: 0 | OUTPATIENT
Start: 2020-04-29

## 2020-04-29 NOTE — TELEPHONE ENCOUNTER
Verified patient identity with two identifiers. Spoke with patient by phone. Per Dr. Myrick Patient pt advised to be evaluated in the ER.   She states she will go to [address] [place] [address].

## 2020-04-29 NOTE — TELEPHONE ENCOUNTER
Verified patient identity with two identifiers. Spoke with patient by phone. C/o pain in back and abdomen 9/10 started last night, vomited x 4-5 throughout the night and this am.   Temp 98  Patient does have a cough but states this is chronic. H/o pancreatitis - very remote - 50 years ago. Put on video visit with Dr. González Reddy today at 6 as Dr. Joselo Valle out of office today. Will route call to both in case they prefer patient to go to ER prior to video visit.

## 2020-05-04 LAB — EF %, EXTERNAL: NORMAL

## 2020-05-06 ENCOUNTER — PATIENT MESSAGE (OUTPATIENT)
Dept: INTERNAL MEDICINE CLINIC | Age: 65
End: 2020-05-06

## 2020-05-08 ENCOUNTER — VIRTUAL VISIT (OUTPATIENT)
Dept: INTERNAL MEDICINE CLINIC | Age: 65
End: 2020-05-08

## 2020-05-08 VITALS — WEIGHT: 220 LBS | BODY MASS INDEX: 37.56 KG/M2 | HEIGHT: 64 IN

## 2020-05-08 DIAGNOSIS — R91.8 PULMONARY NODULES: ICD-10-CM

## 2020-05-08 DIAGNOSIS — E78.5 HYPERLIPIDEMIA WITH TARGET LDL LESS THAN 100: ICD-10-CM

## 2020-05-08 DIAGNOSIS — Z79.4 TYPE 2 DIABETES MELLITUS WITH MICROALBUMINURIA, WITH LONG-TERM CURRENT USE OF INSULIN (HCC): ICD-10-CM

## 2020-05-08 DIAGNOSIS — I10 ESSENTIAL HYPERTENSION: ICD-10-CM

## 2020-05-08 DIAGNOSIS — F33.42 DEPRESSION, MAJOR, RECURRENT, IN COMPLETE REMISSION (HCC): ICD-10-CM

## 2020-05-08 DIAGNOSIS — K85.90 ACUTE PANCREATITIS WITHOUT INFECTION OR NECROSIS, UNSPECIFIED PANCREATITIS TYPE: Primary | ICD-10-CM

## 2020-05-08 DIAGNOSIS — R80.9 TYPE 2 DIABETES MELLITUS WITH MICROALBUMINURIA, WITH LONG-TERM CURRENT USE OF INSULIN (HCC): ICD-10-CM

## 2020-05-08 DIAGNOSIS — E11.29 TYPE 2 DIABETES MELLITUS WITH MICROALBUMINURIA, WITH LONG-TERM CURRENT USE OF INSULIN (HCC): ICD-10-CM

## 2020-05-08 RX ORDER — VALSARTAN 80 MG/1
TABLET ORAL
COMMUNITY
Start: 2020-05-04 | End: 2020-05-28

## 2020-05-08 NOTE — PROGRESS NOTES
Christian Hernandez is a 72 y.o. female who was seen by synchronous (real-time) audio-video technology on 5/8/2020. Consent: Christian Hernandez who was seen by synchronous (real-time) audio-video technology, and/or her healthcare decision maker, is aware that this patient-initiated, Telehealth encounter on 5/8/2020 is a billable service, with coverage as determined by her insurance carrier. She is aware that she may receive a bill and has provided verbal consent to proceed: Yes. Patient identification was verified prior to start of the visit. A caregiver was present when appropriate. Due to this being a TeleHealth encounter (during [facility] emergency), evaluation of the following organ systems was limited: VS/Constituional/EENT/Resp/CV/GI//MS/Neuro/Skin/Heme-Lymph-Imm. Pursuant to the emergency declaration under the [address] [place] [address] waiver authority and the [employer] Act, this Virtual Visit was conducted, with patient's (and/or legal guardian's) consent, to reduce the patient's risk of exposure to COVID-19 and provide necessary medical care. Services were provided through a synchronous discussion virtually to substitute for in-person clinic visit. I was in the office. The patient was at home. Assessment & Plan:   Diagnoses and all orders for this visit:    1. Acute pancreatitis without infection or necrosis, unspecified pancreatitis type  Clinically resolved. She has follow up appt with GI. Agree with stopping bydureon. She is not longer on HCTZ. 2. Type 2 diabetes mellitus with microalbuminuria, with long-term current use of insulin ([address]  Not at goal and she is advised to call her endocrinologist to discuss medication changes now that she is off of her bydureon. Follow strict diabetic diet. 3. Essential hypertension  Check BP and send via [employer] for review.   4. Hyperlipidemia with target LDL less than 100  Continue current medication. 5. Depression, major, recurrent, in complete remission ([address]  Managed by psychiatry and at goal.   6. Pulmonary nodules and SOB with exertion  Agree with further pulmonary evaluation and has upcoming appt next week. 7.  Anemia: etiology unclear but suspect due to GI source and she is advised to discuss further evaluation with her GI specialist Dr Roxana Mejia. May need a colonoscopy. She just had upper endo which per pt showed gastritis. She is on prilosec and no Gerd symptoms. follow up 3 months      Subjective:   [facility]. She reports she was admitted for pancreatitis and discharged on 5/4/20. Presented to [facility] with abdominal pain and nausea and diagnosed with pancreatitis. She had a CT performed which showed acute pancreatitis. Chest xray remarkable for  5 mm and 4 mm pulmonary nodules and she was noted to have hypoxia with O2 desat with exertion into the 80's during hospitalization. Pulmonary consult obtained and has outpt follow up scheduled for 5/15/10. She notes continues to have some mild SOB at rest but worse with exertion. No Cp.  RA O2 at rest was 93-98 during hospitalization. She did have an MRCP which showed no evidence of gallstones in the common duct or pancreas divisum but she was noted to have an atrophic body and tail of her pancreas. GI felt that her pancreatitis was possibly related to elevated triglycerides or hydrochlorothiazide and recommended stopping the hydrochlorothiazide. She has also stopped her bydureon after discussing with her endocrinologist. She had a venous duplex that was negative for DVT and echo was nl except for mild LVH. Her hemoglobin was decreased but there was no evidence of bleeding. hgb 5/4 8.9 5/3 9.5. 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 She reports since discharge home BS ranging 200's.  She is followed by Dr Gerry Pimentel endocrine. BP  not checking. Taking medications and leg swelling improved. Following low fat diet. Increasing fruit and vegetables. Has vv with GI Dr Mensah Brood 5/18/20. Prior to Admission medications    Medication Sig Start Date End Date Taking? Authorizing Provider   valsartan (DIOVAN) 80 mg tablet  5/4/20  Yes Provider, Historical   escitalopram oxalate (LEXAPRO) 10 mg tablet Take 1 Tab by mouth daily. 4/21/20  Yes Lolly Ortiz MD   omeprazole (PRILOSEC) 20 mg capsule TAKE 1 CAPSULE BY MOUTH EVERY DAY 1/17/20  Yes Lolly Ortiz MD   simvastatin (ZOCOR) 40 mg tablet TAKE 1 TABLET BY MOUTH AT BEDTIME 12/26/19  Yes Lolly Ortiz MD   fenofibrate (LOFIBRA) 54 mg tablet Take 1 Tab by mouth daily. 12/26/19  Yes Lolly Ortiz MD   allopurinol (ZYLOPRIM) 300 mg tablet TAKE 1 TABLET BY MOUTH EVERY DAY 12/26/19  Yes Lolly Ortiz MD   fexofenadine (ALLEGRA) 180 mg tablet Take 1 Tab by mouth daily. As needed for allergy   Yes Lolly Ortiz MD   glimepiride (AMARYL) 4 mg tablet TAKE 1 TABLET WITH BREAKFAST OR THE FIRST MAIN MEAL OF THE DAY ONCE A DAY 5/18/18  Yes Provider, Historical   metFORMIN ER (GLUCOPHAGE XR) 500 mg tablet two tabs with breakfast and two tabs with dinner 5/18/18  Yes Provider, Historical   HUMALOG KWIKPEN INSULIN 100 unit/mL kwikpen INJECT 5 UNITS PER 15 GRAMS OF CARBS UP  UNITS PER DAY SUBCUTANEOUSLY 5/23/18  Yes Provider, Historical   NOHEMY PEN NEEDLE 32 gauge x 5/32\" ndle USE AS DIRECTED FOR INSULIN INJECTIONS 4 TIMES A DAY 4/4/18  Yes Provider, Historical   naproxen sodium (ALEVE) 220 mg tablet Take 220 mg by mouth two (2) times daily (with meals). Yes Provider, Historical   TRESIBA FLEXTOUCH U-200 200 unit/mL (3 mL) inpn 80 Units. 1/19/18  Yes Provider, Historical   NYSTOP powder APPLY TO AFFECTED AREA TWO (2) TIMES A DAY.  AS NEEDED 10/16/17  Yes Lolly Ortiz MD   ibuprofen (ADVIL) 200 mg tablet Take  by mouth every six (6) hours as needed. Yes Provider, Historical   terbinafine HCl (LAMISIL) 1 % topical cream Apply  to affected area two (2) times a day. Patient taking differently: Apply  to affected area two (2) times daily as needed. 10/6/15  Yes Too Ferrer MD   glucose blood VI test strips (ASCENSIA CONTOUR) strip by Does Not Apply route See Admin Instructions. Boo contour test strips, tests 2-4 times per day. Ordered by Dr. Allie Moseley. Yes Provider, Historical   Insulin Syringe-Needle U-100 (BD INSULIN SYRINGE ULT-FINE II) 1 mL 31 x 5/16\" syrg USE 4 TIMES A DAY AS DIRECTED 3/20/14  Yes Too Ferrer MD   aspirin 81 mg tablet Take 81 mg by mouth daily. Yes Provider, Historical   valsartan-hydroCHLOROthiazide (DIOVAN-HCT) 80-12.5 mg per tablet TAKE 1 TABLET BY MOUTH EVERY DAY 1/2/20 5/8/20  Too Ferrer MD   BYDUREON 2 mg/0.65 mL pnij 2 mg by SubCUTAneous route every seven (7) days. 6/26/17 5/8/20  Provider, Historical       Allergies   Allergen Reactions    Adhesive Tape Contact Dermatitis     Blistering UNDER STERI-STRIPS    Contrast Agent [Iodine] Hives    Pcn [Penicillins] Hives    Levaquin [Levofloxacin] Nausea and Vomiting    Shellfish Derived Hives and Swelling     CRAB         Review of Systems   Constitutional: Negative for fever. Respiratory: Positive for shortness of breath (at rest mild and worse with exertion). Negative for cough. Cardiovascular: Negative for chest pain and leg swelling. Gastrointestinal: Negative for abdominal pain and heartburn. Neurological: Negative for dizziness and headaches.           Objective:     General: alert, cooperative, no distress   Mental  status: normal mood, behavior, speech, dress, motor activity, and thought processes, able to follow commands   Eyes: EOM intact, normal sclera   Resp: normal effort and no respiratory distress   Neuro: no gross deficits   Psychiatric: normal affect     Visit Vitals  Ht 5' 4\" (1.626 m)   Wt 220 lb (99.8 kg)   BMI 37.76 kg/m² We discussed the expected course, resolution and complications of the diagnosis(es) in detail. Medication risks, benefits, costs, interactions, and alternatives were discussed as indicated. I advised her to contact the office if her condition worsens, changes or fails to improve as anticipated. She expressed understanding with the diagnosis(es) and plan.      Cooper Asencio MD

## 2020-05-12 LAB
CREATININE, EXTERNAL: 0.66
HBA1C MFR BLD HPLC: 10.4 %
LDL-C, EXTERNAL: 45
MICROALBUMIN UR TEST STR-MCNC: 805 MG/DL

## 2020-05-18 ENCOUNTER — TELEPHONE (OUTPATIENT)
Dept: INTERNAL MEDICINE CLINIC | Age: 65
End: 2020-05-18

## 2020-05-18 RX ORDER — SPIRONOLACTONE 25 MG/1
25 TABLET ORAL DAILY
COMMUNITY

## 2020-05-18 RX ORDER — FUROSEMIDE 40 MG/1
TABLET ORAL DAILY
COMMUNITY
End: 2020-11-02 | Stop reason: ALTCHOICE

## 2020-05-18 RX ORDER — AMLODIPINE BESYLATE 5 MG/1
5 TABLET ORAL DAILY
COMMUNITY
End: 2020-10-20

## 2020-05-18 NOTE — TELEPHONE ENCOUNTER
Verified patient identity with two identifiers. Spoke with patient by phone. Saw cardiology today- saw Dr. Zachary Briseno with [facility]  She has been put on 2 fluid pills and additional BP pill  Spironolactone 25mg every day  Amlodipine 5mg every day  Lasix 40mg every day  No current meds stopped, these were added to the Valsartan. Chart updated. Next week she has stress test, echo bubble test, CT lungs, VQ scan.

## 2020-05-20 LAB — HBA1C MFR BLD HPLC: 10.4 %

## 2020-06-05 LAB — EF %, EXTERNAL: NORMAL

## 2020-07-27 ENCOUNTER — VIRTUAL VISIT (OUTPATIENT)
Dept: SLEEP MEDICINE | Age: 65
End: 2020-07-27

## 2020-07-27 DIAGNOSIS — I10 ESSENTIAL HYPERTENSION: ICD-10-CM

## 2020-07-27 DIAGNOSIS — G47.33 OSA (OBSTRUCTIVE SLEEP APNEA): Primary | ICD-10-CM

## 2020-07-27 NOTE — PATIENT INSTRUCTIONS

## 2020-07-27 NOTE — PROGRESS NOTES
[address] [facility] [place], [facility] Sabiha Roy  Tel.  [phone]  Fax. [address] [facility] [address]  [place], [address] [facility]  Tel.  [phone]  Fax. [phone] [address] [facility] Terry Ellis   Tel.  [phone]  Fax. [phone] is a 72 y.o. female who was seen by synchronous (real-time) audio-video technology on 7/27/2020. Consent:  She and/or her healthcare decision maker is aware that this patient-initiated Telehealth encounter is a billable service, with coverage as determined by her insurance carrier. She is aware that she may receive a bill and has provided verbal consent to proceed: Yes    I was in the office while conducting this encounter. Chief Complaint       No chief complaint on file. BRENDEN Monroy is a 72 y.o. female seen for follow-up. She was diagnosed with sleep disordered breathing; HSAT demonstrated overall AHI of 17.5/h associated with minimal SaO2 77%.  Events were more prominent supine with the supinerelated AHI of 38.7/h.  Snoring during 9.6% of the study.      APAP 7-15 cm was initiated. Compliance data downloaded and reviewed in detail with the patient today. During the past 30 days, APAP used during 21 days with the average daily use of 4.9 hours. CMS compliance criteria 50%. AHI 0.9 per hour. Recent episode of pancreatitis and hospitalization accounting for reduction in usage. She notes she is sleeping well with CPAP; not experiencing non-restorative sleep or daytime fatigue.      Allergies   Allergen Reactions    Adhesive Tape Contact Dermatitis     Blistering UNDER STERI-STRIPS    Contrast Agent [Iodine] Hives    Pcn [Penicillins] Hives    Levaquin [Levofloxacin] Nausea and Vomiting    Shellfish Derived Hives and Swelling     CRAB       Current Outpatient Medications   Medication Sig Dispense Refill    valsartan (DIOVAN) 80 mg tablet TAKE 1 TABLET BY MOUTH EVERY DAY 90 Tab 1    spironolactone (ALDACTONE) 25 mg tablet Take  by mouth daily.  furosemide (Lasix) 40 mg tablet Take  by mouth daily.  amLODIPine (NORVASC) 5 mg tablet Take 5 mg by mouth daily.  allopurinoL (ZYLOPRIM) 300 mg tablet TAKE 1 TABLET BY MOUTH EVERY DAY 30 Tab 5    escitalopram oxalate (LEXAPRO) 10 mg tablet Take 1 Tab by mouth daily. 90 Tab 0    omeprazole (PRILOSEC) 20 mg capsule TAKE 1 CAPSULE BY MOUTH EVERY DAY 30 Cap 5    simvastatin (ZOCOR) 40 mg tablet TAKE 1 TABLET BY MOUTH AT BEDTIME 90 Tab 1    fenofibrate (LOFIBRA) 54 mg tablet Take 1 Tab by mouth daily. 90 Tab 1    fexofenadine (ALLEGRA) 180 mg tablet Take 1 Tab by mouth daily. As needed for allergy 30 Tab 11    glimepiride (AMARYL) 4 mg tablet TAKE 1 TABLET WITH BREAKFAST OR THE FIRST MAIN MEAL OF THE DAY ONCE A DAY  6    metFORMIN ER (GLUCOPHAGE XR) 500 mg tablet two tabs with breakfast and two tabs with dinner  6    HUMALOG KWIKPEN INSULIN 100 unit/mL kwikpen INJECT 5 UNITS PER 15 GRAMS OF CARBS UP  UNITS PER DAY SUBCUTANEOUSLY  2    NOHEMY PEN NEEDLE 32 gauge x 5/32\" ndle USE AS DIRECTED FOR INSULIN INJECTIONS 4 TIMES A DAY  3    naproxen sodium (ALEVE) 220 mg tablet Take 220 mg by mouth two (2) times daily (with meals).  TRESIBA FLEXTOUCH U-200 200 unit/mL (3 mL) inpn 80 Units. 6    NYSTOP powder APPLY TO AFFECTED AREA TWO (2) TIMES A DAY. AS NEEDED 60 g 1    ibuprofen (ADVIL) 200 mg tablet Take  by mouth every six (6) hours as needed.  terbinafine HCl (LAMISIL) 1 % topical cream Apply  to affected area two (2) times a day. (Patient taking differently: Apply  to affected area two (2) times daily as needed.) 30 g 0    glucose blood VI test strips (ASCENSIA CONTOUR) strip by Does Not Apply route See Admin Instructions. Boo contour test strips, tests 2-4 times per day. Ordered by Dr. Evi Emerson.       Insulin Syringe-Needle U-100 (BD INSULIN SYRINGE ULT-FINE II) 1 mL 31 x 5/16\" syrg USE 4 TIMES A DAY AS DIRECTED 100 Syringe 5    aspirin 81 mg tablet Take 81 mg by mouth daily. She  has a past medical history of Adverse effect of anesthesia, Allergic rhinitis, Calculus (2.28/11), Chronic pain, Color vision deficiency, Depression, Diabetes ([address] Hypercholesterolemia, Hypertension, Incisional hernia (10/17/2012), Pancreatitis (1969), and Trigger thumb of left hand. She  has a past surgical history that includes hx other surgical (1996); pr cardiac surg procedure unlist (2002); hx ovarian cyst removal (3/2011); hx other surgical (08/04/2016); pr abdomen surgery proc unlisted (2014); hx hysterectomy (1982); hx oophorectomy (Bilateral, 3/11); hx gyn; hx gi; hx gi (05/29/15); hx gi (2014); hx gi ([id number]); hx hernia repair (1-14-16); hx hernia repair (2015); hx hernia repair (08/01/2016); hx orthopaedic (1982); hx heent (1988); hx heent (1993); hx heent; hx heent (7-2-15); hx heent (7-16-15); hx cataract removal (Bilateral, 2015); hx colonoscopy; and hx skin biopsy. She family history includes Cancer in her brother and father; Lung Disease in her mother; Other in her brother. She  reports that she has never smoked. She has never used smokeless tobacco. She reports that she does not drink alcohol or use drugs. Review of Systems:  Unchanged per patient    Guild: 2    Due to this being a telemedicine evaluation, certain elements of the physical examination are unable to be assessed. Objective:     Height: 5'4\"  Weight: 198 lb  BMI: 34.     General:   Conversant, cooperative   Eyes:  no nystagmus   Oropharynx:   Mallampati score II, tongue scalloped                         Neuro:  Speech fluent, face symmetrical             Assessment:       ICD-10-CM ICD-9-CM    1. APOLINAR (obstructive sleep apnea)  G47.33 327.23    2. Essential hypertension  I10 401.9        she is compliant with PAP therapy and PAP continues to benefit patient and remains necessary for control of her sleep apnea.     Plan:   No orders of the defined types were placed in this encounter. * Patient has a history and examination consistent with the diagnosis of sleep apnea. * She was provided information on sleep apnea including corresponding risk factors and the importance of proper treatment. * Treatment options if indicated were reviewed today. * Potential benefit of weight reduction       Sindy Boswell MD, [facility]  Electronically signed 07/27/20    Pursuant to the emergency declaration under the [address] [place] [address] waiver authority and the [employer] Act, this Virtual  Visit was conducted, with patient's consent, to reduce the patient's risk of exposure to COVID-19 and provide continuity of care for an established patient. Services were provided through a video synchronous discussion virtually to substitute for in-person clinic visit. Don Cleveland MD         This note was created using voice recognition software. Despite editing, there may be syntax errors. This note will not be viewable in [address]

## 2020-09-22 DIAGNOSIS — B35.4 TINEA CORPORIS: Primary | ICD-10-CM

## 2020-09-22 RX ORDER — NYSTATIN 100000 [USP'U]/G
POWDER TOPICAL
Qty: 60 G | Refills: 1 | Status: SHIPPED | OUTPATIENT
Start: 2020-09-22

## 2020-09-22 NOTE — TELEPHONE ENCOUNTER
Patient explained break out occasionally in crease of legs. Patient likes to have powder on hand. Advised patient due for annual medicare exam. Patient will call back to scheduled.

## 2020-09-27 ENCOUNTER — HOSPITAL ENCOUNTER (OUTPATIENT)
Dept: PREADMISSION TESTING | Age: 65
Discharge: HOME | End: 2020-09-27
Payer: COMMERCIAL

## 2020-09-27 PROCEDURE — 87635 SARS-COV-2 COVID-19 AMP PRB: CPT

## 2020-09-28 LAB
HEALTH STATUS, XMCV2T: NORMAL
SARS-COV-2, COV2NT: NOT DETECTED
SOURCE, COVRS: NORMAL
SPECIMEN SOURCE, FCOV2M: NORMAL
SPECIMEN TYPE, XMCV1T: NORMAL

## 2020-09-30 ENCOUNTER — ANESTHESIA EVENT (OUTPATIENT)
Dept: ENDOSCOPY | Age: 65
End: 2020-09-30
Payer: COMMERCIAL

## 2020-09-30 RX ORDER — DILTIAZEM HYDROCHLORIDE 120 MG/1
120 CAPSULE, EXTENDED RELEASE ORAL
COMMUNITY
End: 2021-02-23 | Stop reason: DRUGHIGH

## 2020-09-30 NOTE — ANESTHESIA PREPROCEDURE EVALUATION
Anesthetic History   No history of anesthetic complications            Review of Systems / Medical History  Patient summary reviewed, nursing notes reviewed and pertinent labs reviewed    Pulmonary            Asthma : well controlled       Neuro/Psych         Psychiatric history    Comments: Depression Cardiovascular    Hypertension: well controlled          Hyperlipidemia    Exercise tolerance: >4 METS  Comments: 60-70% EF by 6/2020 ECHO   GI/Hepatic/Renal         Renal disease: stones      Comments: Abdominal Pain Endo/Other    Diabetes: poorly controlled, type 2, using insulin    Obesity  Pertinent negatives: No morbid obesity   Other Findings            Physical Exam    Airway  Mallampati: II  TM Distance: > 6 cm  Neck ROM: normal range of motion   Mouth opening: Normal     Cardiovascular    Rhythm: regular  Rate: normal         Dental    Dentition: Upper dentition intact and Lower dentition intact     Pulmonary  Breath sounds clear to auscultation               Abdominal  GI exam deferred       Other Findings            Anesthetic Plan    ASA: 3  Anesthesia type: total IV anesthesia and MAC          Induction: Intravenous  Anesthetic plan and risks discussed with: Patient

## 2020-10-01 ENCOUNTER — HOSPITAL ENCOUNTER (OUTPATIENT)
Age: 65
Setting detail: OUTPATIENT SURGERY
Discharge: HOME | End: 2020-10-01
Attending: INTERNAL MEDICINE | Admitting: INTERNAL MEDICINE
Payer: COMMERCIAL

## 2020-10-01 ENCOUNTER — ANESTHESIA (OUTPATIENT)
Dept: ENDOSCOPY | Age: 65
End: 2020-10-01
Payer: COMMERCIAL

## 2020-10-01 VITALS
BODY MASS INDEX: 33.57 KG/M2 | TEMPERATURE: 98.3 F | DIASTOLIC BLOOD PRESSURE: 58 MMHG | HEART RATE: 75 BPM | HEIGHT: 65 IN | WEIGHT: 201.5 LBS | RESPIRATION RATE: 16 BRPM | SYSTOLIC BLOOD PRESSURE: 125 MMHG | OXYGEN SATURATION: 98 %

## 2020-10-01 PROCEDURE — 76040000019: Performed by: INTERNAL MEDICINE

## 2020-10-01 PROCEDURE — 74011250637 HC RX REV CODE- 250/637: Performed by: INTERNAL MEDICINE

## 2020-10-01 PROCEDURE — 88305 TISSUE EXAM BY PATHOLOGIST: CPT

## 2020-10-01 PROCEDURE — 74011250636 HC RX REV CODE- 250/636

## 2020-10-01 PROCEDURE — 76060000031 HC ANESTHESIA FIRST 0.5 HR: Performed by: INTERNAL MEDICINE

## 2020-10-01 PROCEDURE — 77030013992 HC SNR POLYP ENDOSC BSC -B: Performed by: INTERNAL MEDICINE

## 2020-10-01 PROCEDURE — 2709999900 HC NON-CHARGEABLE SUPPLY: Performed by: INTERNAL MEDICINE

## 2020-10-01 PROCEDURE — 74011000250 HC RX REV CODE- 250

## 2020-10-01 PROCEDURE — 74011250636 HC RX REV CODE- 250/636: Performed by: INTERNAL MEDICINE

## 2020-10-01 RX ORDER — SODIUM CHLORIDE 0.9 % (FLUSH) 0.9 %
5-40 SYRINGE (ML) INJECTION EVERY 8 HOURS
Status: DISCONTINUED | OUTPATIENT
Start: 2020-10-01 | End: 2020-10-01 | Stop reason: HOSPADM

## 2020-10-01 RX ORDER — SODIUM CHLORIDE 9 MG/ML
75 INJECTION, SOLUTION INTRAVENOUS CONTINUOUS
Status: DISCONTINUED | OUTPATIENT
Start: 2020-10-01 | End: 2020-10-01 | Stop reason: HOSPADM

## 2020-10-01 RX ORDER — NALOXONE HYDROCHLORIDE 0.4 MG/ML
0.4 INJECTION, SOLUTION INTRAMUSCULAR; INTRAVENOUS; SUBCUTANEOUS
Status: DISCONTINUED | OUTPATIENT
Start: 2020-10-01 | End: 2020-10-01 | Stop reason: HOSPADM

## 2020-10-01 RX ORDER — ATROPINE SULFATE 0.1 MG/ML
0.5 INJECTION INTRAVENOUS
Status: DISCONTINUED | OUTPATIENT
Start: 2020-10-01 | End: 2020-10-01 | Stop reason: HOSPADM

## 2020-10-01 RX ORDER — FLUMAZENIL 0.1 MG/ML
0.2 INJECTION INTRAVENOUS
Status: DISCONTINUED | OUTPATIENT
Start: 2020-10-01 | End: 2020-10-01 | Stop reason: HOSPADM

## 2020-10-01 RX ORDER — EPINEPHRINE 0.1 MG/ML
1 INJECTION INTRACARDIAC; INTRAVENOUS
Status: DISCONTINUED | OUTPATIENT
Start: 2020-10-01 | End: 2020-10-01 | Stop reason: HOSPADM

## 2020-10-01 RX ORDER — PROPOFOL 10 MG/ML
INJECTION, EMULSION INTRAVENOUS AS NEEDED
Status: DISCONTINUED | OUTPATIENT
Start: 2020-10-01 | End: 2020-10-01 | Stop reason: HOSPADM

## 2020-10-01 RX ORDER — SODIUM CHLORIDE 0.9 % (FLUSH) 0.9 %
5-40 SYRINGE (ML) INJECTION AS NEEDED
Status: DISCONTINUED | OUTPATIENT
Start: 2020-10-01 | End: 2020-10-01 | Stop reason: HOSPADM

## 2020-10-01 RX ORDER — DEXTROMETHORPHAN/PSEUDOEPHED 2.5-7.5/.8
1.2 DROPS ORAL
Status: DISCONTINUED | OUTPATIENT
Start: 2020-10-01 | End: 2020-10-01 | Stop reason: HOSPADM

## 2020-10-01 RX ORDER — LIDOCAINE HYDROCHLORIDE 20 MG/ML
INJECTION, SOLUTION EPIDURAL; INFILTRATION; INTRACAUDAL; PERINEURAL AS NEEDED
Status: DISCONTINUED | OUTPATIENT
Start: 2020-10-01 | End: 2020-10-01 | Stop reason: HOSPADM

## 2020-10-01 RX ADMIN — SODIUM CHLORIDE 75 ML/HR: 900 INJECTION, SOLUTION INTRAVENOUS at 07:31

## 2020-10-01 RX ADMIN — PROPOFOL 50 MG: 10 INJECTION, EMULSION INTRAVENOUS at 07:36

## 2020-10-01 RX ADMIN — PROPOFOL 50 MG: 10 INJECTION, EMULSION INTRAVENOUS at 07:39

## 2020-10-01 RX ADMIN — PROPOFOL 100 MG: 10 INJECTION, EMULSION INTRAVENOUS at 07:31

## 2020-10-01 RX ADMIN — SIMETHICONE 80 MG: 20 SUSPENSION/ DROPS ORAL at 07:37

## 2020-10-01 RX ADMIN — PROPOFOL 50 MG: 10 INJECTION, EMULSION INTRAVENOUS at 07:34

## 2020-10-01 RX ADMIN — LIDOCAINE HYDROCHLORIDE 20 MG: 20 INJECTION, SOLUTION EPIDURAL; INFILTRATION; INTRACAUDAL; PERINEURAL at 07:31

## 2020-10-01 NOTE — H&P
Gastroenterology Outpatient History and Physical    Patient: Vito Or    Physician: Pasquale Osorio MD    Chief Complaint: GHASSAN  History of Present Illness: Normal EGD    History:  Past Medical History:   Diagnosis Date    Adverse effect of anesthesia     woke during surgical procedure with MAC and cath    Allergic rhinitis     Asthma     mild    Calculus 2.28/11    EXCESS URIC ACID; HAS STONES CURRENTLY    Chronic pain     Color vision deficiency     Depression     Diabetes ([address]     Hypercholesterolemia     Hypertension     Ill-defined condition     pneumonia /bronchitis hx    Ill-defined condition 2020    pancreatitis    Incisional hernia 10/17/2012    Pancreatitis 1969    CYST ON PANCREAS BURST BEFORE SURG FOR REMOVAL    Trigger thumb of left hand     and right hand      Past Surgical History:   Procedure Laterality Date    ABDOMEN SURGERY PROC UNLISTED  2014    hernia repair    ABDOMEN SURGERY [address]    pancreatic cyst (ruptured) pancreatitis    CARDIAC SURG PROCEDURE UNLIST  2002    CARDIAC CATH FOR PALPITATIONS    HX CATARACT REMOVAL Bilateral 2015 Johana July Dr. Wilver Russo    HX COLONOSCOPY      HX GI      pancreatitis; CYST    HX GI  05/29/15    hernia repair, #2    HX GI  2014    HERNIA REPAIR #1    HX GI  [id number]    incisional hernia repair with component separation    HX GYN      5 dilation and curratages    HX HEENT  1988    T&A    HX HEENT  1993    SEPTOPLASTY    HX HEENT      LASER SURGERY BOTH EYES    HX HEENT  7-2-15    left catarac surgery    HX HEENT  7-16-15    right catarac surgery    HX HERNIA REPAIR  1-14-16    recurrent ventral incisional hernia repair-[facility]-Dr. Cas Katz    HX HERNIA REPAIR  2015    HX HERNIA REPAIR  08/01/2016    hernia mesh repair    HX HYSTERECTOMY  1982    HX OOPHORECTOMY Bilateral 3/11    benign ovarian cyst    HX ORTHOPAEDIC  1982    TUMOR EXCISED L MIDDLE FINGER    [facility] MOLES EXCISED-FACE & BACK (WOKE DURING)    HX OTHER SURGICAL  08/04/2016    exploratory lap, DIONNE    HX OVARIAN CYST REMOVAL  3/2011    HX SKIN BIOPSY      moles on back (size of dime) and some on face removed      Social History     Socioeconomic History    Marital status:      Spouse name: Not on file    Number of children: Not on file    Years of education: Not on file    Highest education level: Not on file   Tobacco Use    Smoking status: Never Smoker    Smokeless tobacco: Never Used   Substance and Sexual Activity    Alcohol use: No    Drug use: No    Sexual activity: Yes     Partners: Male     Birth control/protection: None, Surgical      Family History   Problem Relation Age of Onset    Lung Disease Mother         copd    Cancer Father         LUNG, LIVER, skin    Other Brother         ACROMEGALY    Cancer Brother         melanoma    Anesth Problems Neg Hx       Patient Active Problem List   Diagnosis Code    Allergic rhinitis, cause unspecified J30.9    Hyperlipidemia with target LDL less than 100 E78.5    Type 2 diabetes mellitus with microalbuminuria, with long-term current use of insulin (HCC) E11.29, R80.9, Z79.4    Essential hypertension I10    Obesity E66.9    Nephrolithiasis, 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 9) E66.01       Allergies: Allergies   Allergen Reactions    Adhesive Tape Contact Dermatitis     Blistering UNDER STERI-STRIPS    Contrast Agent [Iodine] Hives    Pcn [Penicillins] Hives    Levaquin [Levofloxacin] Nausea and Vomiting    Shellfish Derived Hives and Swelling     CRAB     Medications:   Prior to Admission medications    Medication Sig Start Date End Date Taking?  Authorizing Provider   dilTIAZem ER (Tiadylt ER) 120 mg capsule Take 120 mg by mouth every morning. Yes Provider, Historical   valsartan (DIOVAN) 80 mg tablet TAKE 1 TABLET BY MOUTH EVERY DAY 5/28/20  Yes Ericka Brown MD   spironolactone (ALDACTONE) 25 mg tablet Take 25 mg by mouth daily. Yes Provider, Historical   furosemide (Lasix) 40 mg tablet Take  by mouth daily. Yes Provider, Historical   amLODIPine (NORVASC) 5 mg tablet Take 5 mg by mouth daily. Yes Provider, Historical   allopurinoL (ZYLOPRIM) 300 mg tablet TAKE 1 TABLET BY MOUTH EVERY DAY 5/15/20  Yes Ericka Brown MD   escitalopram oxalate (LEXAPRO) 10 mg tablet Take 1 Tab by mouth daily. 4/21/20  Yes Ericka Brown MD   omeprazole (PRILOSEC) 20 mg capsule TAKE 1 CAPSULE BY MOUTH EVERY DAY 1/17/20  Yes Ericka Brown MD   simvastatin (ZOCOR) 40 mg tablet TAKE 1 TABLET BY MOUTH AT BEDTIME 12/26/19  Yes Ericka Brown MD   fenofibrate (LOFIBRA) 54 mg tablet Take 1 Tab by mouth daily. 12/26/19  Yes Ericka Brown MD   glimepiride (AMARYL) 4 mg tablet TAKE 1 TABLET WITH BREAKFAST OR THE FIRST MAIN MEAL OF THE DAY ONCE A DAY 5/18/18  Yes Provider, Historical   metFORMIN ER (GLUCOPHAGE XR) 500 mg tablet two tabs with breakfast and two tabs with dinner 5/18/18  Yes Provider, Historical   HUMALOG KWIKPEN INSULIN 100 unit/mL kwikpen INJECT 5 UNITS PER 15 GRAMS OF CARBS UP  UNITS PER DAY SUBCUTANEOUSLY 5/23/18  Yes Provider, Historical   NOHEMY PEN NEEDLE 32 gauge x 5/32\" ndle USE AS DIRECTED FOR INSULIN INJECTIONS 4 TIMES A DAY 4/4/18  Yes Provider, Historical   TRESIBA FLEXTOUCH U-200 200 unit/mL (3 mL) inpn 80 Units daily. 1/19/18  Yes Provider, Historical   glucose blood VI test strips (ASCENSIA CONTOUR) strip by Does Not Apply route See Admin Instructions. Boo contour test strips, tests 2-4 times per day. Ordered by Dr. Tan Holm.    Yes Provider, Historical   Insulin Syringe-Needle U-100 (BD INSULIN SYRINGE ULT-FINE II) 1 mL 31 x 5/16\" syrg USE 4 TIMES A DAY AS DIRECTED 3/20/14 Yes Harry Reyes MD   aspirin 81 mg tablet Take 81 mg by mouth daily. Yes Provider, Historical   nystatin (Nystop) powder APPLY TO AFFECTED AREA TWO (2) TIMES A DAY. AS NEEDED 9/22/20   Harry Reyes MD   fexofenadine (ALLEGRA) 180 mg tablet Take 1 Tab by mouth daily. As needed for allergy    Harry Reyes MD   naproxen sodium (ALEVE) 220 mg tablet Take 220 mg by mouth as needed. Provider, Historical   ibuprofen (ADVIL) 200 mg tablet Take  by mouth every six (6) hours as needed. Provider, Historical   terbinafine HCl (LAMISIL) 1 % topical cream Apply  to affected area two (2) times a day. Patient taking differently: Apply  to affected area two (2) times daily as needed. 10/6/15   Harry Reyes MD     Physical Exam:   Vital Signs: Blood pressure (!) 142/61, pulse 77, temperature 98 °F (36.7 °C), resp. rate 18, height 5' 5\" (1.651 m), weight 91.4 kg (201 lb 8 oz), SpO2 99 %.   General: well developed, well nourished   HEENT: unremarkable   Heart: regular rhythm no mumur    Lungs: clear   Abdominal:  benign   Neurological: unremarkable   Extremities: no edema     Findings/Diagnosis: GHASSAN/Normal EGD  Plan of Care/Planned Procedure: Colonoscopy with conscious/deep sedation    Signed:  Young Hernandez MD 10/1/2020

## 2020-10-01 NOTE — PROGRESS NOTES
Irving Ukiah  1955  [id number]    Situation:  Verbal report received from: Araceli Dominguez RN  Procedure: Procedure(s):  COLONOSCOPY  ENDOSCOPIC POLYPECTOMY    Background:    Preoperative diagnosis: IRON DEFICIENCY ANEMIA  Postoperative diagnosis: colon polyp and hemorrhoids    :  Dr. Son Zelaya  Assistant(s): Endoscopy Technician-1: Jerald Perez  Endoscopy RN-1: Florencia Kasper RN    Specimens:   ID Type Source Tests Collected by Time Destination   1 : Colon, Descending polyp Preservative Colon, Descending  Naldo Sanchez MD 10/1/2020 0149 Pathology     H. Pylori  no    Assessment:  Intra-procedure medications   Anesthesia gave intra-procedure sedation and medications, see anesthesia flow sheet yes    Intravenous fluids: NS@ KVO     Vital signs stable      Abdominal assessment: round and soft      Recommendation:  Discharge patient per MD order .   Family or Friend    Permission to share finding with family or friend yes

## 2020-10-01 NOTE — PROCEDURES
NAME:  Angela Pradhan   :   1955   MRN:   [id number]     Date/Time:  10/1/2020 7:43 AM    Colonoscopy Operative Report    Procedure Type:   Colonoscopy with polypectomy (cold snare)     Indications:     Iron deficiency anemia  Pre-operative Diagnosis: see indication above  Post-operative Diagnosis:  See findings below  :  Uri Shearer MD  Referring Provider: --Roberto Crum MD    Exam:  Airway: clear, no airway problems anticipated  Heart: RRR, without gallops or rubs  Lungs: clear bilaterally without wheezes, crackles, or rhonchi  Abdomen: soft, nontender, nondistended, bowel sounds present  Mental Status: awake, alert and oriented to person, place and time    Sedation:  MAC anesthesia Propofol  Procedure Details:  After informed consent was obtained with all risks and benefits of procedure explained and preoperative exam completed, the patient was taken to the endoscopy suite and placed in the left lateral decubitus position. Upon sequential sedation as per above, a digital rectal exam was performed demonstrating internal hemorrhoids. The Olympus videocolonoscope  was inserted in the rectum and carefully advanced to the cecum, which was identified by the ileocecal valve and appendiceal orifice. The quality of preparation was good. The colonoscope was slowly withdrawn with careful evaluation between folds. Retroflexion in the rectum was completed demonstrating internal hemorrhoids. Findings:   1. 3 mm sessile polyp in descending colon. Removed by cold snare polypectomy  2. Small internal hemorrhoids  3. Otherwise normal colonoscopy through to the cecum    Specimen Removed:  1. Descending colon polyp  Complications: None. EBL:  None. Impression:    1. 3 mm sessile polyp in descending colon. Removed by cold snare polypectomy  2. Small internal hemorrhoids  3. Otherwise normal colonoscopy through to the cecum    Recommendations:   1. Follow up pathology  2.  Repeat colonoscopy in 5 years with surveillance    Discharge Disposition:  Home in the company of a  when able to ambulate.       Pat Monk MD

## 2020-10-01 NOTE — DISCHARGE INSTRUCTIONS
Viky Hassan  [id number]  1955    COLON DISCHARGE INSTRUCTIONS  Discomfort:  Redness at IV site- apply warm compress to area; if redness or soreness persist- contact your physician  There may be a slight amount of blood passed from the rectum  Gaseous discomfort- walking, belching will help relieve any discomfort  You may not operate a vehicle for 12 hours  You may not engage in an occupation involving machinery or appliances for rest of today  You may not drink alcoholic beverages for at least 12 hours  Avoid making any critical decisions for at least 24 hour  DIET:   Regular diet. - however -  remember your colon is empty and a heavy meal will produce gas. Avoid these foods:  vegetables, fried / greasy foods, carbonated drinks for today  MEDICATION:  Per Medication Reconciliation       ACTIVITY:  You may not resume your normal daily activities until tomorrow AM; it is recommended that you spend the remainder of the day resting -  avoid any strenuous activity. CALL M.D. ANY SIGN OF:   Increasing pain, nausea, vomiting  Abdominal distension (swelling)  New increased bleeding (oral or rectal)  Fever (chills)  Pain in chest area  Bloody discharge from nose or mouth  Shortness of breath    You may not  take any Advil, Ibuprofen, Motrin, Aleve, or Goodys for 10 days, ONLY  Tylenol as needed for pain. IMPRESSION:  Impression:    1. 3 mm sessile polyp in descending colon. Removed by cold snare polypectomy  2. Small internal hemorrhoids  3. Otherwise normal colonoscopy through to the cecum    Recommendations:   1. Follow up pathology  2.  Repeat colonoscopy in 5 years with surveillance    Follow-up Instructions:   Call Dr. Brent Barrett for the results of procedure / biopsy in 7-10 days  Telephone # [phone]      Megan Brown MD

## 2020-10-01 NOTE — ANESTHESIA POSTPROCEDURE EVALUATION
Procedure(s):  COLONOSCOPY  ENDOSCOPIC POLYPECTOMY. total IV anesthesia, MAC    Anesthesia Post Evaluation        Patient location during evaluation: PACU  Note status: Adequate. Level of consciousness: responsive to verbal stimuli and sleepy but conscious  Pain management: satisfactory to patient  Airway patency: patent  Anesthetic complications: no  Cardiovascular status: acceptable  Respiratory status: acceptable  Hydration status: acceptable  Comments: +Post-Anesthesia Evaluation and Assessment    Patient: Lorenzo Bernstein MRN: [id number]  SSN: [id number]   YOB: 1955  Age: 72 y.o. Sex: female      Cardiovascular Function/Vital Signs    BP 98/71   Pulse 76   Temp 36.8 °C (98.3 °F)   Resp 18   Ht 5' 5\" (1.651 m)   Wt 91.4 kg (201 lb 8 oz)   SpO2 98%   BMI 33.53 kg/m²     Patient is status post Procedure(s):  COLONOSCOPY  ENDOSCOPIC POLYPECTOMY. Nausea/Vomiting: Controlled. Postoperative hydration reviewed and adequate. Pain:  Pain Scale 1: Numeric (0 - 10) (10/01/20 0800)  Pain Intensity 1: 0 (10/01/20 0800)   Managed. Neurological Status: At baseline. Mental Status and Level of Consciousness: Arousable. Pulmonary Status:   O2 Device: Room air (10/01/20 0755)   Adequate oxygenation and airway patent. Complications related to anesthesia: None    Post-anesthesia assessment completed. No concerns. Signed By: Saad Mensah MD    10/1/2020  Post anesthesia nausea and vomiting:  controlled      INITIAL Post-op Vital signs:   Vitals Value Taken Time   /52 10/1/2020  8:15 AM   Temp 36.8 °C (98.3 °F) 10/1/2020  7:55 AM   Pulse 74 10/1/2020  8:16 AM   Resp 18 10/1/2020  8:16 AM   SpO2 98 % 10/1/2020  8:16 AM   Vitals shown include unvalidated device data.

## 2020-10-01 NOTE — PROGRESS NOTES
Endoscope was pre-cleaned at the bedside immediately following procedure by Dakota Wray    Medications     Medication Rate/Dose/Volume Action Route Date Time   Administering User Audit    lidocaine (PF) 2% (mg) 20 mg Given IntraVENous 10/01/20  0731  Daphene Patience, NP     propofol 10 mg/mL (mg) 100 mg Given IntraVENous 10/01/20  0731  Daphene Patience, NP edited     50 mg Given IntraVENous  1502  Daphene Patience, NP      50 mg Given IntraVENous  0736  Daphene Patience, NP      50 mg Given IntraVENous  0739  Daphene Patience, NP         .

## 2020-10-20 ENCOUNTER — OFFICE VISIT (OUTPATIENT)
Dept: INTERNAL MEDICINE CLINIC | Age: 65
End: 2020-10-20
Payer: COMMERCIAL

## 2020-10-20 VITALS
DIASTOLIC BLOOD PRESSURE: 72 MMHG | HEART RATE: 81 BPM | BODY MASS INDEX: 33.22 KG/M2 | RESPIRATION RATE: 16 BRPM | OXYGEN SATURATION: 99 % | WEIGHT: 199.4 LBS | TEMPERATURE: 97.1 F | SYSTOLIC BLOOD PRESSURE: 110 MMHG | HEIGHT: 65 IN

## 2020-10-20 DIAGNOSIS — L30.9 DERMATITIS: ICD-10-CM

## 2020-10-20 DIAGNOSIS — R91.8 PULMONARY NODULES: ICD-10-CM

## 2020-10-20 DIAGNOSIS — E11.29 TYPE 2 DIABETES MELLITUS WITH MICROALBUMINURIA, WITH LONG-TERM CURRENT USE OF INSULIN (HCC): Primary | ICD-10-CM

## 2020-10-20 DIAGNOSIS — E78.5 HYPERLIPIDEMIA WITH TARGET LDL LESS THAN 100: ICD-10-CM

## 2020-10-20 DIAGNOSIS — I10 ESSENTIAL HYPERTENSION: ICD-10-CM

## 2020-10-20 DIAGNOSIS — R80.9 TYPE 2 DIABETES MELLITUS WITH MICROALBUMINURIA, WITH LONG-TERM CURRENT USE OF INSULIN (HCC): Primary | ICD-10-CM

## 2020-10-20 DIAGNOSIS — F33.2 MODERATELY SEVERE RECURRENT MAJOR DEPRESSION (HCC): ICD-10-CM

## 2020-10-20 DIAGNOSIS — D64.9 ANEMIA, UNSPECIFIED TYPE: ICD-10-CM

## 2020-10-20 DIAGNOSIS — Z23 ENCOUNTER FOR IMMUNIZATION: ICD-10-CM

## 2020-10-20 DIAGNOSIS — Z79.4 TYPE 2 DIABETES MELLITUS WITH MICROALBUMINURIA, WITH LONG-TERM CURRENT USE OF INSULIN (HCC): Primary | ICD-10-CM

## 2020-10-20 PROCEDURE — 1101F PT FALLS ASSESS-DOCD LE1/YR: CPT | Performed by: INTERNAL MEDICINE

## 2020-10-20 PROCEDURE — G8536 NO DOC ELDER MAL SCRN: HCPCS | Performed by: INTERNAL MEDICINE

## 2020-10-20 PROCEDURE — 1090F PRES/ABSN URINE INCON ASSESS: CPT | Performed by: INTERNAL MEDICINE

## 2020-10-20 PROCEDURE — 90732 PPSV23 VACC 2 YRS+ SUBQ/IM: CPT | Performed by: INTERNAL MEDICINE

## 2020-10-20 PROCEDURE — 99214 OFFICE O/P EST MOD 30 MIN: CPT | Performed by: INTERNAL MEDICINE

## 2020-10-20 PROCEDURE — G8427 DOCREV CUR MEDS BY ELIG CLIN: HCPCS | Performed by: INTERNAL MEDICINE

## 2020-10-20 PROCEDURE — 3017F COLORECTAL CA SCREEN DOC REV: CPT | Performed by: INTERNAL MEDICINE

## 2020-10-20 PROCEDURE — G0009 ADMIN PNEUMOCOCCAL VACCINE: HCPCS | Performed by: INTERNAL MEDICINE

## 2020-10-20 PROCEDURE — 3046F HEMOGLOBIN A1C LEVEL >9.0%: CPT | Performed by: INTERNAL MEDICINE

## 2020-10-20 PROCEDURE — G8754 DIAS BP LESS 90: HCPCS | Performed by: INTERNAL MEDICINE

## 2020-10-20 PROCEDURE — G8417 CALC BMI ABV UP PARAM F/U: HCPCS | Performed by: INTERNAL MEDICINE

## 2020-10-20 PROCEDURE — G0439 PPPS, SUBSEQ VISIT: HCPCS | Performed by: INTERNAL MEDICINE

## 2020-10-20 PROCEDURE — 2022F DILAT RTA XM EVC RTNOPTHY: CPT | Performed by: INTERNAL MEDICINE

## 2020-10-20 PROCEDURE — G8399 PT W/DXA RESULTS DOCUMENT: HCPCS | Performed by: INTERNAL MEDICINE

## 2020-10-20 PROCEDURE — G9717 DOC PT DX DEP/BP F/U NT REQ: HCPCS | Performed by: INTERNAL MEDICINE

## 2020-10-20 PROCEDURE — G8752 SYS BP LESS 140: HCPCS | Performed by: INTERNAL MEDICINE

## 2020-10-20 RX ORDER — ACETAMINOPHEN 500 MG
TABLET ORAL
COMMUNITY

## 2020-10-20 RX ORDER — CLOBETASOL PROPIONATE 0.5 MG/G
CREAM TOPICAL
COMMUNITY
Start: 2020-09-24

## 2020-10-20 RX ORDER — ESCITALOPRAM OXALATE 20 MG/1
20 TABLET ORAL DAILY
Qty: 30 TAB | Refills: 0 | Status: SHIPPED | OUTPATIENT
Start: 2020-10-20 | End: 2020-11-05 | Stop reason: SDUPTHER

## 2020-10-20 NOTE — PROGRESS NOTES
Evette Riggins is a 72 y.o. female who was seen today for a follow-up visit. Assessment & Plan:     Encounter Diagnoses   Name Primary?  Type 2 diabetes mellitus with microalbuminuria, with long-term current use of insulin ([facility] [address]  Continue current plan per endocrine and check lab work.    has not been taking medication regularly. Yes    Moderately severe recurrent major depression (HCC)  Increase lexapro to 20 mg every day. States she is compliant with this medication. Will contact me if any worsening or SI.      Essential hypertension  Well controlled, continue current medication.  Hyperlipidemia with target LDL less than 100  Well controlled, continue current medication.  Pulmonary nodules  Followed by pulmonary and she has deferred chest CT for now.  Medicare annual wellness visit, subsequent  Health maintenance reviewed and updated with patient today at visit.  Dermatitis neck  Continue steroid cream and avoid lotions and detergents. If not improving derm.  Encounter for immunization     Anemia, unspecified type  Denies any bleeding. Orders Placed This Encounter    PNEUMOCOCCAL POLYSACCHARIDE VACCINE, 23-VALENT, ADULT OR IMMUNOSUPPRESSED PT DOSE,    MICROALBUMIN, UR, RAND W/ MICROALB/CREAT RATIO    HEMOGLOBIN A1C WITH EAG    METABOLIC PANEL, COMPREHENSIVE    CBC WITH AUTOMATED DIFF    escitalopram oxalate (LEXAPRO) 20 mg tablet       follow up 3 weeks. lab results and schedule of future lab studies reviewed with patient  reviewed diet, exercise and weight control  reviewed medications and side effects in detail. Recent nl colonoscopy this month. Subjective:   Evette Riggins was seen for Diabetes  AODM, HTN, hypercholesterolemia. 1. Endocrine Review  Since last visit: followed by Dr Sultana Krishnan and recent hgba1c 11 and averaging .  she has not always been taking her medications for her diabetes and also not following diabetic diet.  No low blood sugars. 2. Cardiovascular: She did not take her blood pressure medication this morning. No medication side effects noted, The home BP readings outside of office: 120-80's/70-80.  Diet and Lifestyle: Not eating as healthy recently. Exercise: none  3. Depression:   Feeling increasingly down. Taking lexapro. No side effects. 3 most recent PHQ Screens 10/20/2020   Little interest or pleasure in doing things Nearly every day   Feeling down, depressed, irritable, or hopeless Nearly every day   Total Score PHQ 2 6   Trouble falling or staying asleep, or sleeping too much Nearly every day   Feeling tired or having little energy Nearly every day   Poor appetite, weight loss, or overeating Nearly every day   Feeling bad about yourself - or that you are a failure or have let yourself or your family down Nearly every day   Trouble concentrating on things such as school, work, reading, or watching TV Not at all   Moving or speaking so slowly that other people could have noticed; or the opposite being so fidgety that others notice Not at all   Thoughts of being better off dead, or hurting yourself in some way Not at all   PHQ 9 Score 18   How difficult have these problems made it for you to do your work, take care of your home and get along with others Very difficult          Review of Systems   Respiratory: Negative for shortness of breath. Cardiovascular: Negative for chest pain and leg swelling. Gastrointestinal: Negative for abdominal pain and heartburn. Physical Exam  Vitals signs and nursing note reviewed. Constitutional:       General: She is not in acute distress. Appearance: She is well-developed. HENT:      Head: Normocephalic and atraumatic. Cardiovascular:      Rate and Rhythm: Normal rate and regular rhythm. Pulmonary:      Effort: Pulmonary effort is normal.      Breath sounds: Normal breath sounds. No wheezing.    Abdominal:      General: Bowel sounds are normal. There is no distension. Palpations: Abdomen is soft. There is no mass. Tenderness: There is no abdominal tenderness. Musculoskeletal:      Right lower leg: No edema. Left lower leg: No edema. Skin:     Comments: Fine erythema on anterior neck. Psychiatric:         Mood and Affect: Mood is depressed. Aspects of this note may have been generated using voice recognition software. Despite editing, there may be some syntax errors   I have discussed the diagnosis with the patient and the intended plan as seen in the above orders. The patient has received an after-visit summary and questions were answered concerning future plans. I have discussed any recommended medication side effects and warnings with the patient as well.   She has expressed understanding of the diagnosis and plan    Kim Kovacs MD

## 2020-10-20 NOTE — PROGRESS NOTES
Brianne Ramirez  is a 72 y.o. female who present for routine immunizations. Prior to vaccine administration: Consent was obtained. Risks and adverse reactions were discussed. The patient was provided the VIS and they were given an opportunity to ask questions; all questions were addressed. She  denies any symptoms, reactions or allergies that would exclude them from being immunized today. There were no adverse reactions observed post vaccination. Patient was advised to seek medical or call the office with any questions or concerns post vaccination. Patient verbalized understanding.    Dennis Iverson LPN

## 2020-10-20 NOTE — PATIENT INSTRUCTIONS
Medicare Wellness Visit, Female The best way to live healthy is to have a lifestyle where you eat a well-balanced diet, exercise regularly, limit alcohol use, and quit all forms of tobacco/nicotine, if applicable. Regular preventive services are another way to keep healthy. Preventive services (vaccines, screening tests, monitoring & exams) can help personalize your care plan, which helps you manage your own care. Screening tests can find health problems at the earliest stages, when they are easiest to treat. [facility] follows the current, evidence-based guidelines published by the [facility] ([facility]) when recommending preventive services for our patients. Because we follow these guidelines, sometimes recommendations change over time as research supports it. (For example, mammograms used to be recommended annually. Even though Medicare will still pay for an annual mammogram, the newer guidelines recommend a mammogram every two years for women of average risk). Of course, you and your doctor may decide to screen more often for some diseases, based on your risk and your co-morbidities (chronic disease you are already diagnosed with). Preventive services for you include: - Medicare offers their members a free annual wellness visit, which is time for you and your primary care provider to discuss and plan for your preventive service needs. Take advantage of this benefit every year! 
-All adults over the age of 72 should receive the recommended pneumonia vaccines. Current USPSTF guidelines recommend a series of two vaccines for the best pneumonia protection.  
-All adults should have a flu vaccine yearly and a tetanus vaccine every 10 years.  
-All adults age 48 and older should receive the shingles vaccines (series of two vaccines). -All adults age 38-68 who are overweight should have a diabetes screening test once every three years. -All adults born between [birth year] and 1965 should be screened once for Hepatitis C. 
-Other screening tests and preventive services for persons with diabetes include: an eye exam to screen for diabetic retinopathy, a kidney function test, a foot exam, and stricter control over your cholesterol.  
-Cardiovascular screening for adults with routine risk involves an electrocardiogram (ECG) at intervals determined by your doctor.  
-Colorectal cancer screenings should be done for adults age 54-65 with no increased risk factors for colorectal cancer. There are a number of acceptable methods of screening for this type of cancer. Each test has its own benefits and drawbacks. Discuss with your doctor what is most appropriate for you during your annual wellness visit. The different tests include: colonoscopy (considered the best screening method), a fecal occult blood test, a fecal DNA test, and sigmoidoscopy. 
 
-A bone mass density test is recommended when a woman turns 65 to screen for osteoporosis. This test is only recommended one time, as a screening. Some providers will use this same test as a disease monitoring tool if you already have osteoporosis. -Breast cancer screenings are recommended every other year for women of normal risk, age 54-69. 
-Cervical cancer screenings for women over age 72 are only recommended with certain risk factors. Here is a list of your current Health Maintenance items (your personalized list of preventive services) with a due date: 
Health Maintenance Due Topic Date Due  Mammogram  08/17/2017 [facility] Eye Exam  07/02/2020  Hemoglobin A1C    08/12/2020

## 2020-10-21 LAB
ALBUMIN SERPL-MCNC: 4.3 G/DL (ref 3.8–4.8)
ALBUMIN/CREAT UR: 40 MG/G CREAT (ref 0–29)
ALBUMIN/GLOB SERPL: 1.7 {RATIO} (ref 1.2–2.2)
ALP SERPL-CCNC: 80 IU/L (ref 39–117)
ALT SERPL-CCNC: 19 IU/L (ref 0–32)
AST SERPL-CCNC: 13 IU/L (ref 0–40)
BASOPHILS # BLD AUTO: 0.1 X10E3/UL (ref 0–0.2)
BASOPHILS NFR BLD AUTO: 1 %
BILIRUB SERPL-MCNC: 0.3 MG/DL (ref 0–1.2)
BUN SERPL-MCNC: 31 MG/DL (ref 8–27)
BUN/CREAT SERPL: 28 (ref 12–28)
CALCIUM SERPL-MCNC: 9.3 MG/DL (ref 8.7–10.3)
CHLORIDE SERPL-SCNC: 97 MMOL/L (ref 96–106)
CO2 SERPL-SCNC: 27 MMOL/L (ref 20–29)
CREAT SERPL-MCNC: 1.09 MG/DL (ref 0.57–1)
CREAT UR-MCNC: 162.1 MG/DL
EOSINOPHIL # BLD AUTO: 0.2 X10E3/UL (ref 0–0.4)
EOSINOPHIL NFR BLD AUTO: 2 %
ERYTHROCYTE [DISTWIDTH] IN BLOOD BY AUTOMATED COUNT: 13.9 % (ref 11.7–15.4)
EST. AVERAGE GLUCOSE BLD GHB EST-MCNC: 286 MG/DL
GLOBULIN SER CALC-MCNC: 2.6 G/DL (ref 1.5–4.5)
GLUCOSE SERPL-MCNC: 289 MG/DL (ref 65–99)
HBA1C MFR BLD: 11.6 % (ref 4.8–5.6)
HCT VFR BLD AUTO: 38.7 % (ref 34–46.6)
HGB BLD-MCNC: 12.7 G/DL (ref 11.1–15.9)
IMM GRANULOCYTES # BLD AUTO: 0 X10E3/UL (ref 0–0.1)
IMM GRANULOCYTES NFR BLD AUTO: 1 %
LYMPHOCYTES # BLD AUTO: 2.2 X10E3/UL (ref 0.7–3.1)
LYMPHOCYTES NFR BLD AUTO: 28 %
MCH RBC QN AUTO: 27.4 PG (ref 26.6–33)
MCHC RBC AUTO-ENTMCNC: 32.8 G/DL (ref 31.5–35.7)
MCV RBC AUTO: 83 FL (ref 79–97)
MICROALBUMIN UR-MCNC: 65.6 UG/ML
MONOCYTES # BLD AUTO: 0.3 X10E3/UL (ref 0.1–0.9)
MONOCYTES NFR BLD AUTO: 4 %
NEUTROPHILS # BLD AUTO: 5.2 X10E3/UL (ref 1.4–7)
NEUTROPHILS NFR BLD AUTO: 64 %
PLATELET # BLD AUTO: 323 X10E3/UL (ref 150–450)
POTASSIUM SERPL-SCNC: 5.1 MMOL/L (ref 3.5–5.2)
PROT SERPL-MCNC: 6.9 G/DL (ref 6–8.5)
RBC # BLD AUTO: 4.64 X10E6/UL (ref 3.77–5.28)
SODIUM SERPL-SCNC: 137 MMOL/L (ref 134–144)
WBC # BLD AUTO: 8 X10E3/UL (ref 3.4–10.8)

## 2020-10-28 NOTE — PROGRESS NOTES
Notified patient per provider result note. Patient taking two separate fluid pills. Prescribed by pulmonary. Would this be contributing to the dehydration?

## 2020-10-28 NOTE — PROGRESS NOTES
Notify patient her hemoglobin A1c is increased now to 11.6 and this indicates an average blood sugar of 286. Kidney function is decreased and it looks like she may be dehydrated. She had reported missing taking her diabetes medications at her most recent visit. Please  her regarding importance of taking medications that blood sugars at this level are damaging to her kidneys, heart, nerves, brain, vision and can lead to long-lasting damage. Please fax this lab work to Dr. Brenda Lockwood her endocrinologist.  Please also assist her to make a follow-up appointment to be seen in 2 weeks which is when she is due for follow-up on changes made in her antidepressant medication.

## 2020-10-29 DIAGNOSIS — F32.5 MAJOR DEPRESSIVE DISORDER WITH SINGLE EPISODE, IN FULL REMISSION (HCC): Primary | ICD-10-CM

## 2020-10-29 RX ORDER — ESCITALOPRAM OXALATE 10 MG/1
TABLET ORAL
Qty: 30 TAB | Refills: 2 | OUTPATIENT
Start: 2020-10-29

## 2020-11-05 NOTE — TELEPHONE ENCOUNTER
Requested Prescriptions     Pending Prescriptions Disp Refills    escitalopram oxalate (LEXAPRO) 20 mg tablet 30 Tab 0     Sig: Take 1 Tab by mouth daily.      Refused Prescriptions Disp Refills    escitalopram oxalate (LEXAPRO) 10 mg tablet [Pharmacy Med Name: ESCITALOPRAM 10 MG TABLET] 30 Tab 2     Sig: TAKE 1 TABLET BY MOUTH EVERY DAY     Refused By: Parisa MOREAU     Reason for Refusal: Medication dose changed       [id number] [place], [place] - [address]

## 2020-11-06 RX ORDER — ESCITALOPRAM OXALATE 20 MG/1
20 TABLET ORAL DAILY
Qty: 30 TAB | Refills: 0 | Status: SHIPPED | OUTPATIENT
Start: 2020-11-06 | End: 2020-11-12 | Stop reason: SDUPTHER

## 2020-11-12 ENCOUNTER — OFFICE VISIT (OUTPATIENT)
Dept: INTERNAL MEDICINE CLINIC | Age: 65
End: 2020-11-12
Payer: COMMERCIAL

## 2020-11-12 VITALS
WEIGHT: 200.2 LBS | TEMPERATURE: 97.6 F | HEART RATE: 70 BPM | OXYGEN SATURATION: 98 % | DIASTOLIC BLOOD PRESSURE: 64 MMHG | RESPIRATION RATE: 16 BRPM | HEIGHT: 65 IN | BODY MASS INDEX: 33.36 KG/M2 | SYSTOLIC BLOOD PRESSURE: 105 MMHG

## 2020-11-12 DIAGNOSIS — Z12.31 VISIT FOR SCREENING MAMMOGRAM: ICD-10-CM

## 2020-11-12 DIAGNOSIS — E11.29 TYPE 2 DIABETES MELLITUS WITH MICROALBUMINURIA, WITH LONG-TERM CURRENT USE OF INSULIN (HCC): ICD-10-CM

## 2020-11-12 DIAGNOSIS — F32.5 MAJOR DEPRESSIVE DISORDER WITH SINGLE EPISODE, IN FULL REMISSION (HCC): Primary | ICD-10-CM

## 2020-11-12 DIAGNOSIS — Z79.4 TYPE 2 DIABETES MELLITUS WITH MICROALBUMINURIA, WITH LONG-TERM CURRENT USE OF INSULIN (HCC): ICD-10-CM

## 2020-11-12 DIAGNOSIS — R91.8 PULMONARY NODULES: ICD-10-CM

## 2020-11-12 DIAGNOSIS — R80.9 TYPE 2 DIABETES MELLITUS WITH MICROALBUMINURIA, WITH LONG-TERM CURRENT USE OF INSULIN (HCC): ICD-10-CM

## 2020-11-12 DIAGNOSIS — N18.31 CHRONIC RENAL IMPAIRMENT, STAGE 3A (HCC): ICD-10-CM

## 2020-11-12 PROCEDURE — G8399 PT W/DXA RESULTS DOCUMENT: HCPCS | Performed by: INTERNAL MEDICINE

## 2020-11-12 PROCEDURE — 1090F PRES/ABSN URINE INCON ASSESS: CPT | Performed by: INTERNAL MEDICINE

## 2020-11-12 PROCEDURE — G9717 DOC PT DX DEP/BP F/U NT REQ: HCPCS | Performed by: INTERNAL MEDICINE

## 2020-11-12 PROCEDURE — G9899 SCRN MAM PERF RSLTS DOC: HCPCS | Performed by: INTERNAL MEDICINE

## 2020-11-12 PROCEDURE — 3017F COLORECTAL CA SCREEN DOC REV: CPT | Performed by: INTERNAL MEDICINE

## 2020-11-12 PROCEDURE — G8752 SYS BP LESS 140: HCPCS | Performed by: INTERNAL MEDICINE

## 2020-11-12 PROCEDURE — G8754 DIAS BP LESS 90: HCPCS | Performed by: INTERNAL MEDICINE

## 2020-11-12 PROCEDURE — 99214 OFFICE O/P EST MOD 30 MIN: CPT | Performed by: INTERNAL MEDICINE

## 2020-11-12 PROCEDURE — G8427 DOCREV CUR MEDS BY ELIG CLIN: HCPCS | Performed by: INTERNAL MEDICINE

## 2020-11-12 PROCEDURE — 2022F DILAT RTA XM EVC RTNOPTHY: CPT | Performed by: INTERNAL MEDICINE

## 2020-11-12 PROCEDURE — 1101F PT FALLS ASSESS-DOCD LE1/YR: CPT | Performed by: INTERNAL MEDICINE

## 2020-11-12 PROCEDURE — G8536 NO DOC ELDER MAL SCRN: HCPCS | Performed by: INTERNAL MEDICINE

## 2020-11-12 PROCEDURE — G8417 CALC BMI ABV UP PARAM F/U: HCPCS | Performed by: INTERNAL MEDICINE

## 2020-11-12 PROCEDURE — 3046F HEMOGLOBIN A1C LEVEL >9.0%: CPT | Performed by: INTERNAL MEDICINE

## 2020-11-12 RX ORDER — ESCITALOPRAM OXALATE 20 MG/1
20 TABLET ORAL DAILY
Qty: 30 TAB | Refills: 5 | Status: SHIPPED | OUTPATIENT
Start: 2020-11-12 | End: 2020-12-03 | Stop reason: SDUPTHER

## 2020-11-12 NOTE — PROGRESS NOTES
Pierre Qureshi is a 72 y.o. female who was seen today for a follow-up visit. Assessment & Plan:   Diagnoses and all orders for this visit:    1. Major depressive disorder with single episode, in full remission ([address] well-controlled continue with current medication  -     escitalopram oxalate (LEXAPRO) 20 mg tablet; Take 1 Tab by mouth daily. 2. Type 2 diabetes mellitus with microalbuminuria, with long-term current use of insulin (HCC)  Doing much better with lifestyle part of management of her diabetes with improvement in her depression. She will continue with current medications and keep her upcoming appointment with endocrine. 3. Chronic renal impairment, stage 3a  Doing well off of Lasix and will repeat her metabolic panel.  regarding increasing water intake and avoidance of NSAIDs.  -     METABOLIC PANEL, BASIC; Future    4. Pulmonary nodules  Asymptomatic. Due for repeat chest CT without contrast  -     CT CHEST WO CONT; Future    5. Visit for screening mammogram  -     [facility] 3D [facility] W MAMMO BI SCREENING INCL CAD; Future  Follow-up 3 months        lab results and schedule of future lab studies reviewed with patient. Subjective:   Pierre Qureshi was seen for   1. Depression: She states she is feeling so much better with the increase in Lexapro to 20 mg daily. No side effects on medication states she just feels lighter and not sad no suicidal thoughts. She is finding more motivation to work on her diabetes. 2.  Diabetes mellitus: She is doing so much better on her diet. BS in 110-120's range. No low BS. She has been on strict low carb diet over the past week. She has appt with her endocrine Dr Ivana Ramirez in 1 week. Reviewed again significant elevation of A1c with recent check. 3.  Chronic renal insufficiency: She has stopped her Lasix and has been feeling better off of that medication and not having to urinate as frequently. She denies any swelling in her lower extremities.   4. History of lung nodules on previous chest CT and due for 1 year repeat in May 2020. She had plan to do this with pulmonary but has canceled that appointment and would like me to go ahead and order it today. Current Outpatient Medications   Medication Sig Dispense Refill    escitalopram oxalate (LEXAPRO) 20 mg tablet Take 1 Tab by mouth daily. 30 Tab 5    clobetasoL (TEMOVATE) 0.05 % topical cream APPLY THIN COAT TO AFFECTED AREA TWICE A DAY      acetaminophen (Tylenol Extra Strength) 500 mg tablet Take  by mouth every six (6) hours as needed for Pain.  cranberry conc/C/Bacill coag (AZO CRANBERRY + PROBIOTIC PO) Take 1 Tab by mouth daily.  dilTIAZem ER (Tiadylt ER) 120 mg capsule Take 120 mg by mouth every morning.  nystatin (Nystop) powder APPLY TO AFFECTED AREA TWO (2) TIMES A DAY. AS NEEDED 60 g 1    valsartan (DIOVAN) 80 mg tablet TAKE 1 TABLET BY MOUTH EVERY DAY 90 Tab 1    spironolactone (ALDACTONE) 25 mg tablet Take 25 mg by mouth daily.  allopurinoL (ZYLOPRIM) 300 mg tablet TAKE 1 TABLET BY MOUTH EVERY DAY 30 Tab 5    omeprazole (PRILOSEC) 20 mg capsule TAKE 1 CAPSULE BY MOUTH EVERY DAY 30 Cap 5    simvastatin (ZOCOR) 40 mg tablet TAKE 1 TABLET BY MOUTH AT BEDTIME 90 Tab 1    fenofibrate (LOFIBRA) 54 mg tablet Take 1 Tab by mouth daily. 90 Tab 1    glimepiride (AMARYL) 4 mg tablet TAKE 1 TABLET WITH BREAKFAST OR THE FIRST MAIN MEAL OF THE DAY ONCE A DAY  6    metFORMIN ER (GLUCOPHAGE XR) 500 mg tablet two tabs with breakfast and two tabs with dinner  6    HUMALOG KWIKPEN INSULIN 100 unit/mL kwikpen INJECT 5 UNITS PER 15 GRAMS OF CARBS UP  UNITS PER DAY SUBCUTANEOUSLY  2    NOHEMY PEN NEEDLE 32 gauge x 5/32\" ndle USE AS DIRECTED FOR INSULIN INJECTIONS 4 TIMES A DAY  3    naproxen sodium (ALEVE) 220 mg tablet Take 220 mg by mouth as needed.  TRESIBA FLEXTOUCH U-200 200 unit/mL (3 mL) inpn 80 Units daily.   6    ibuprofen (ADVIL) 200 mg tablet Take  by mouth every six (6) hours as needed.  terbinafine HCl (LAMISIL) 1 % topical cream Apply  to affected area two (2) times a day. (Patient taking differently: Apply  to affected area two (2) times daily as needed.) 30 g 0    glucose blood VI test strips (ASCENSIA CONTOUR) strip by Does Not Apply route See Admin Instructions. Boo contour test strips, tests 2-4 times per day. Ordered by Dr. Luis E Araiza.  Insulin Syringe-Needle U-100 (BD INSULIN SYRINGE ULT-FINE II) 1 mL 31 x 5/16\" syrg USE 4 TIMES A DAY AS DIRECTED 100 Syringe 5    aspirin 81 mg tablet Take 81 mg by mouth daily. Review of Systems   Respiratory: Negative for shortness of breath. Cardiovascular: Negative for chest pain and leg swelling. Gastrointestinal: Negative for abdominal pain and heartburn. Physical Exam  Vitals signs and nursing note reviewed. Constitutional:       General: She is not in acute distress. Appearance: She is well-developed. HENT:      Head: Normocephalic and atraumatic. Cardiovascular:      Rate and Rhythm: Normal rate and regular rhythm. Pulmonary:      Effort: Pulmonary effort is normal.      Breath sounds: Normal breath sounds. No wheezing. Abdominal:      General: Bowel sounds are normal. There is no distension. Palpations: Abdomen is soft. There is no mass. Tenderness: There is no abdominal tenderness. Musculoskeletal:      Right lower leg: No edema. Left lower leg: No edema. Psychiatric:         Mood and Affect: Mood normal.       Visit Vitals  /64 (BP 1 Location: Right arm, BP Patient Position: Sitting)   Pulse 70   Temp 97.6 °F (36.4 °C) (Temporal)   Resp 16   Ht 5' 5\" (1.651 m)   Wt 200 lb 3.2 oz (90.8 kg)   SpO2 98%   BMI 33.32 kg/m²          Aspects of this note may have been generated using voice recognition software. Despite editing, there may be some syntax errors   I have discussed the diagnosis with the patient and the intended plan as seen in the above orders.   The patient has received an after-visit summary and questions were answered concerning future plans. I have discussed any recommended medication side effects and warnings with the patient as well.   She has expressed understanding of the diagnosis and plan    Sanjiv Anthony MD

## 2020-11-16 ENCOUNTER — HOSPITAL ENCOUNTER (OUTPATIENT)
Dept: CT IMAGING | Age: 65
Discharge: HOME | End: 2020-11-16
Attending: INTERNAL MEDICINE
Payer: COMMERCIAL

## 2020-11-16 DIAGNOSIS — R91.8 PULMONARY NODULES: ICD-10-CM

## 2020-11-16 PROCEDURE — 71250 CT THORAX DX C-: CPT

## 2020-11-17 NOTE — PROGRESS NOTES
Notify pt that chest CT shows increase in pulmonary nodule size from 2 mm to 5 mm and repeat chest CT is recommended in 3 months to monitor this. I would also like her to make appointment with her pulmonary specialist to review the increasing size of the nodule and assist with further evaluation.

## 2020-11-18 DIAGNOSIS — R91.1 PULMONARY NODULE: Primary | ICD-10-CM

## 2020-11-18 NOTE — PROGRESS NOTES
Notified patient per provider result note. Patient will call and schedule with Pulmonary in next 3 months.

## 2020-11-20 ENCOUNTER — TRANSCRIBE ORDER (OUTPATIENT)
Dept: SCHEDULING | Age: 65
End: 2020-11-20

## 2020-11-20 DIAGNOSIS — M81.0 AGE-RELATED OSTEOPOROSIS WITHOUT CURRENT PATHOLOGICAL FRACTURE: Primary | ICD-10-CM

## 2020-11-20 DIAGNOSIS — R91.8 PULMONARY NODULES: Primary | ICD-10-CM

## 2020-11-23 DIAGNOSIS — F32.5 MAJOR DEPRESSIVE DISORDER WITH SINGLE EPISODE, IN FULL REMISSION (HCC): ICD-10-CM

## 2020-11-24 RX ORDER — ESCITALOPRAM OXALATE 20 MG/1
20 TABLET ORAL DAILY
Qty: 30 TAB | Refills: 5 | OUTPATIENT
Start: 2020-11-24

## 2020-11-30 ENCOUNTER — HOSPITAL ENCOUNTER (OUTPATIENT)
Dept: BONE DENSITY | Age: 65
Discharge: HOME | End: 2020-11-30
Attending: INTERNAL MEDICINE
Payer: COMMERCIAL

## 2020-11-30 DIAGNOSIS — M81.0 AGE-RELATED OSTEOPOROSIS WITHOUT CURRENT PATHOLOGICAL FRACTURE: ICD-10-CM

## 2020-11-30 PROCEDURE — 77080 DXA BONE DENSITY AXIAL: CPT

## 2020-12-03 DIAGNOSIS — F32.5 MAJOR DEPRESSIVE DISORDER WITH SINGLE EPISODE, IN FULL REMISSION (HCC): ICD-10-CM

## 2020-12-03 RX ORDER — ESCITALOPRAM OXALATE 20 MG/1
20 TABLET ORAL DAILY
Qty: 90 TAB | Refills: 1 | Status: SHIPPED | OUTPATIENT
Start: 2020-12-03 | End: 2021-08-27

## 2020-12-03 NOTE — TELEPHONE ENCOUNTER
Requested Prescriptions     Pending Prescriptions Disp Refills    escitalopram oxalate (LEXAPRO) 20 mg tablet 30 Tab 5     Sig: Take 1 Tab by mouth daily.      Requested quantity: 90    [id number] [place] [id number]

## 2020-12-29 RX ORDER — SIMVASTATIN 40 MG/1
TABLET, FILM COATED ORAL
Qty: 90 TAB | Refills: 1 | Status: SHIPPED | OUTPATIENT
Start: 2020-12-29 | End: 2021-10-11

## 2021-02-19 ENCOUNTER — TELEPHONE (OUTPATIENT)
Dept: INTERNAL MEDICINE CLINIC | Age: 66
End: 2021-02-19

## 2021-02-19 NOTE — TELEPHONE ENCOUNTER
[id number] Isabelle Canseco with [facility]    A peer to peer would need to be done for the patient to get the CT scan call Aretha Dougherty [phone] case # [id number]

## 2021-02-23 ENCOUNTER — OFFICE VISIT (OUTPATIENT)
Dept: INTERNAL MEDICINE CLINIC | Age: 66
End: 2021-02-23
Payer: COMMERCIAL

## 2021-02-23 VITALS
WEIGHT: 203.8 LBS | OXYGEN SATURATION: 97 % | DIASTOLIC BLOOD PRESSURE: 62 MMHG | HEIGHT: 65 IN | TEMPERATURE: 97.5 F | SYSTOLIC BLOOD PRESSURE: 136 MMHG | RESPIRATION RATE: 16 BRPM | HEART RATE: 65 BPM | BODY MASS INDEX: 33.95 KG/M2

## 2021-02-23 DIAGNOSIS — R80.9 TYPE 2 DIABETES MELLITUS WITH MICROALBUMINURIA, WITH LONG-TERM CURRENT USE OF INSULIN (HCC): Primary | ICD-10-CM

## 2021-02-23 DIAGNOSIS — E11.29 TYPE 2 DIABETES MELLITUS WITH MICROALBUMINURIA, WITH LONG-TERM CURRENT USE OF INSULIN (HCC): Primary | ICD-10-CM

## 2021-02-23 DIAGNOSIS — E78.5 HYPERLIPIDEMIA WITH TARGET LDL LESS THAN 100: ICD-10-CM

## 2021-02-23 DIAGNOSIS — R80.9 MICROALBUMINURIA: ICD-10-CM

## 2021-02-23 DIAGNOSIS — Z79.4 TYPE 2 DIABETES MELLITUS WITH MICROALBUMINURIA, WITH LONG-TERM CURRENT USE OF INSULIN (HCC): Primary | ICD-10-CM

## 2021-02-23 DIAGNOSIS — I10 ESSENTIAL HYPERTENSION: ICD-10-CM

## 2021-02-23 PROCEDURE — G8536 NO DOC ELDER MAL SCRN: HCPCS | Performed by: INTERNAL MEDICINE

## 2021-02-23 PROCEDURE — 3017F COLORECTAL CA SCREEN DOC REV: CPT | Performed by: INTERNAL MEDICINE

## 2021-02-23 PROCEDURE — G8399 PT W/DXA RESULTS DOCUMENT: HCPCS | Performed by: INTERNAL MEDICINE

## 2021-02-23 PROCEDURE — G9899 SCRN MAM PERF RSLTS DOC: HCPCS | Performed by: INTERNAL MEDICINE

## 2021-02-23 PROCEDURE — G8754 DIAS BP LESS 90: HCPCS | Performed by: INTERNAL MEDICINE

## 2021-02-23 PROCEDURE — G8427 DOCREV CUR MEDS BY ELIG CLIN: HCPCS | Performed by: INTERNAL MEDICINE

## 2021-02-23 PROCEDURE — 2022F DILAT RTA XM EVC RTNOPTHY: CPT | Performed by: INTERNAL MEDICINE

## 2021-02-23 PROCEDURE — 3046F HEMOGLOBIN A1C LEVEL >9.0%: CPT | Performed by: INTERNAL MEDICINE

## 2021-02-23 PROCEDURE — 99214 OFFICE O/P EST MOD 30 MIN: CPT | Performed by: INTERNAL MEDICINE

## 2021-02-23 PROCEDURE — G9717 DOC PT DX DEP/BP F/U NT REQ: HCPCS | Performed by: INTERNAL MEDICINE

## 2021-02-23 PROCEDURE — G8417 CALC BMI ABV UP PARAM F/U: HCPCS | Performed by: INTERNAL MEDICINE

## 2021-02-23 PROCEDURE — 1101F PT FALLS ASSESS-DOCD LE1/YR: CPT | Performed by: INTERNAL MEDICINE

## 2021-02-23 PROCEDURE — G8752 SYS BP LESS 140: HCPCS | Performed by: INTERNAL MEDICINE

## 2021-02-23 PROCEDURE — 1090F PRES/ABSN URINE INCON ASSESS: CPT | Performed by: INTERNAL MEDICINE

## 2021-02-23 RX ORDER — DILTIAZEM HYDROCHLORIDE 240 MG/1
CAPSULE, EXTENDED RELEASE ORAL
COMMUNITY
Start: 2021-01-07 | End: 2022-05-12 | Stop reason: DRUGHIGH

## 2021-02-23 NOTE — TELEPHONE ENCOUNTER
She is going to discuss with her pulmonary specialist at her appt this week and so hold on anything for now. I have asked her to have her lung specialist order the test as they may be more successful.

## 2021-02-24 DIAGNOSIS — E87.5 HYPERKALEMIA: Primary | ICD-10-CM

## 2021-02-24 LAB
ALBUMIN SERPL-MCNC: 4 G/DL (ref 3.8–4.8)
ALBUMIN/GLOB SERPL: 1.7 {RATIO} (ref 1.2–2.2)
ALP SERPL-CCNC: 71 IU/L (ref 39–117)
ALT SERPL-CCNC: 12 IU/L (ref 0–32)
AST SERPL-CCNC: 12 IU/L (ref 0–40)
BILIRUB SERPL-MCNC: 0.3 MG/DL (ref 0–1.2)
BUN SERPL-MCNC: 27 MG/DL (ref 8–27)
BUN/CREAT SERPL: 24 (ref 12–28)
CALCIUM SERPL-MCNC: 9.8 MG/DL (ref 8.7–10.3)
CHLORIDE SERPL-SCNC: 101 MMOL/L (ref 96–106)
CHOLEST SERPL-MCNC: 162 MG/DL (ref 100–199)
CO2 SERPL-SCNC: 22 MMOL/L (ref 20–29)
CREAT SERPL-MCNC: 1.11 MG/DL (ref 0.57–1)
EST. AVERAGE GLUCOSE BLD GHB EST-MCNC: 280 MG/DL
GLOBULIN SER CALC-MCNC: 2.4 G/DL (ref 1.5–4.5)
GLUCOSE SERPL-MCNC: 166 MG/DL (ref 65–99)
HBA1C MFR BLD: 11.4 % (ref 4.8–5.6)
HDLC SERPL-MCNC: 55 MG/DL
LDLC SERPL CALC-MCNC: 79 MG/DL (ref 0–99)
POTASSIUM SERPL-SCNC: 5.5 MMOL/L (ref 3.5–5.2)
PROT SERPL-MCNC: 6.4 G/DL (ref 6–8.5)
SODIUM SERPL-SCNC: 137 MMOL/L (ref 134–144)
TRIGL SERPL-MCNC: 166 MG/DL (ref 0–149)
VLDLC SERPL CALC-MCNC: 28 MG/DL (ref 5–40)

## 2021-02-24 NOTE — PROGRESS NOTES
Notify patient that her fasting blood sugar is improved at 166 but her hemoglobin A1c which represents her 3-month average is still very high at 11.4. Please fax her lab work to her endocrinologist.  Please find out if she would like to see a Pharm. D. for diabetic education and to review her medications. Potassium level is also elevated. Please confirm that she is not taking any potassium supplements or using potassium salts in her diet. I would like her to increase her water intake. Recommend repeat her potassium level within the next week. Lab slip is printed.

## 2021-02-24 NOTE — PROGRESS NOTES
Notified patient per provider result note. Patient agreed to scheduling with pharmacist. Patient is not taking potassium supplements or using potassium salts. She does not drink a lot of water. Lab slip at .

## 2021-02-25 ENCOUNTER — TRANSCRIBE ORDER (OUTPATIENT)
Dept: SCHEDULING | Age: 66
End: 2021-02-25

## 2021-02-25 DIAGNOSIS — R91.8 PULMONARY NODULES: Primary | ICD-10-CM

## 2021-02-26 DIAGNOSIS — E11.29 TYPE 2 DIABETES MELLITUS WITH MICROALBUMINURIA, WITH LONG-TERM CURRENT USE OF INSULIN (HCC): Primary | ICD-10-CM

## 2021-02-26 DIAGNOSIS — Z79.4 TYPE 2 DIABETES MELLITUS WITH MICROALBUMINURIA, WITH LONG-TERM CURRENT USE OF INSULIN (HCC): Primary | ICD-10-CM

## 2021-02-26 DIAGNOSIS — R80.9 TYPE 2 DIABETES MELLITUS WITH MICROALBUMINURIA, WITH LONG-TERM CURRENT USE OF INSULIN (HCC): Primary | ICD-10-CM

## 2021-03-03 ENCOUNTER — TELEPHONE (OUTPATIENT)
Dept: INTERNAL MEDICINE CLINIC | Age: 66
End: 2021-03-03

## 2021-03-04 LAB — POTASSIUM SERPL-SCNC: 4.8 MMOL/L (ref 3.5–5.2)

## 2021-03-05 ENCOUNTER — HOSPITAL ENCOUNTER (OUTPATIENT)
Dept: MAMMOGRAPHY | Age: 66
Discharge: HOME | End: 2021-03-05
Attending: INTERNAL MEDICINE
Payer: COMMERCIAL

## 2021-03-05 DIAGNOSIS — Z12.31 VISIT FOR SCREENING MAMMOGRAM: ICD-10-CM

## 2021-03-05 PROCEDURE — 77063 BREAST TOMOSYNTHESIS BI: CPT

## 2021-03-05 NOTE — TELEPHONE ENCOUNTER
Called patient to discuss coming in for a visit. Patient would rather come in to the office vs a virtual visit. Address and contact info provided.  Patient scheduled for 3/16/21 at Mika Mariee, GailD, BCPS, CDCES    CLINICAL PHARMACY CONSULT: MED RECONCILIATION/REVIEW ADDENDUM    For Pharmacy Admin Tracking Only    PHSO: PHSO Patient?: Yes  Total # of Interventions Recommended: Count: 1  Total Interventions Accepted: 1  Time Spent (min): 5

## 2021-03-06 ENCOUNTER — IMMUNIZATION (OUTPATIENT)
Dept: INTERNAL MEDICINE CLINIC | Age: 66
End: 2021-03-06
Payer: COMMERCIAL

## 2021-03-06 DIAGNOSIS — Z23 ENCOUNTER FOR IMMUNIZATION: Primary | ICD-10-CM

## 2021-03-06 PROCEDURE — 0001A COVID-19, MRNA, LNP-S, PF, 30MCG/0.3ML DOSE(PFIZER): CPT | Performed by: FAMILY MEDICINE

## 2021-03-06 PROCEDURE — 91300 COVID-19, MRNA, LNP-S, PF, 30MCG/0.3ML DOSE(PFIZER): CPT | Performed by: FAMILY MEDICINE

## 2021-03-16 ENCOUNTER — OFFICE VISIT (OUTPATIENT)
Dept: INTERNAL MEDICINE CLINIC | Age: 66
End: 2021-03-16

## 2021-03-16 DIAGNOSIS — E11.29 TYPE 2 DIABETES MELLITUS WITH MICROALBUMINURIA, WITH LONG-TERM CURRENT USE OF INSULIN (HCC): Primary | ICD-10-CM

## 2021-03-16 DIAGNOSIS — Z79.4 TYPE 2 DIABETES MELLITUS WITH MICROALBUMINURIA, WITH LONG-TERM CURRENT USE OF INSULIN (HCC): Primary | ICD-10-CM

## 2021-03-16 DIAGNOSIS — R80.9 TYPE 2 DIABETES MELLITUS WITH MICROALBUMINURIA, WITH LONG-TERM CURRENT USE OF INSULIN (HCC): Primary | ICD-10-CM

## 2021-03-16 NOTE — PROGRESS NOTES
Patient seen with PharmD María Elena Kelley. Please see her note for more detailed information from visit. I was present for the visit and agree with the assessment and plan.     Eloisa Garcia, Dorota, BCPS, [facility]

## 2021-03-16 NOTE — Clinical Note
Tyree Minaya - would you sign off on this order for [facility] sensors? Patient willing to give this a try to see how things are going. I already got her set up with one sensor and a reader but she'll need additional sensors from the pharmacy. Thank you!

## 2021-03-16 NOTE — PROGRESS NOTES
Pharmacy Progress Note - Diabetes Management     S/O:Ms. Daphne Lawson is a 72 y.o. female, referred by Dr. Guillermina El MD seen today for diabetes management. Patient's last A1c was 11.4% on 2/23/2021 slightly lower than 11.6% in October 2020. HPI:    Patient seen today for initial visit for diabetes management. Patient reports struggling with depression and has a hard time staying compliant with her many medications. She is interested in ideas to decrease her pill burden or help her remember to take her medications. She mostly takes AM pills with her tresiba injection and PM pills with evening humalog. She carries her humalog pen with her during the day when eating meals out. She finds that after she misses her medications for a dose in the AM or PM up to twice a week. Then she struggles for days to bring herself to feeling back to normal.  After missing an insulin injection, she reports having to use \"bumps\" of 30 units of Humalog to bring her out of the 300-400s. She previously used weekly GLP1-RA therapy but stopped because of risk of pancreatitis. She reports 2 episodes of pancreatitis, one at age 15 and one last year which required a 6-day hospitalization. She had stopped the GLP1-RA therapy before her episode of pancreatitis. Her Cardiologist recommended a Kathy Heater but patient declined due to frequent yeast infections and the increased risk associated due to [facility]. She was also concerned about the impact that medication would have on her kidneys. She is followed by Endocrinologist Dr. Cherise Carlson who she reports seeing in 2021. She also is followed by Pulmonologist Dr Sierra Ross for monitoring of lung nodules with scan scheduled for 3/19/21. She is scheduled for her second COVID dose on 3/21/21.     10-year ASCVD+:  14%    Past Medical History:  Past Medical History:   Diagnosis Date    Adverse effect of anesthesia     woke during surgical procedure with MAC and cath    Allergic rhinitis     Asthma     mild    Calculus 2.28/11    EXCESS URIC ACID; HAS STONES CURRENTLY    Chronic pain     Color vision deficiency     Depression     Diabetes ([facility] [address]     Hypercholesterolemia     Hypertension     Ill-defined condition     pneumonia /bronchitis hx    Ill-defined condition 2020    pancreatitis    Incisional hernia 10/17/2012    Pancreatitis 1969    CYST ON PANCREAS BURST BEFORE SURG FOR REMOVAL    Trigger thumb of left hand     and right hand       Current Diabetes Regimen:    -    Metformin XR 500mg 2 tablets twice a day   -    Glimepiride 4 mg 1 tablet once a day  -    Humalog 100units/ml Kwikpen inject 5 units for 15 grams carbs up to 100 units daily (up to 5 mins after eating/calculating her carbohydrates)  -    Tresiba Flextouch 200 units/ml inject 80 units daily - years at this dose       Adherent: no - admits to missing morning AM/PM meds up to twice a week  Adverse Effects:  no  Cost Barriers:  no    Current Medications:  Current Outpatient Medications   Medication Sig    Tiadylt  mg capsule TAKE 1 CAPSULE BY MOUTH EVERY DAY    omeprazole (PRILOSEC) 20 mg capsule TAKE 1 CAPSULE BY MOUTH EVERY DAY    valsartan (DIOVAN) 80 mg tablet TAKE 1 TABLET BY MOUTH EVERY DAY    simvastatin (ZOCOR) 40 mg tablet TAKE 1 TABLET BY MOUTH EVERYDAY AT BEDTIME    fenofibrate (LOFIBRA) 54 mg tablet TAKE 1 TABLET BY MOUTH EVERY DAY    escitalopram oxalate (LEXAPRO) 20 mg tablet Take 1 Tab by mouth daily.  clobetasoL (TEMOVATE) 0.05 % topical cream two (2) times daily as needed.  acetaminophen (Tylenol Extra Strength) 500 mg tablet Take  by mouth every six (6) hours as needed for Pain.  cranberry conc/C/Bacill coag (AZO CRANBERRY + PROBIOTIC PO) Take 1 Tab by mouth daily.  nystatin (Nystop) powder APPLY TO AFFECTED AREA TWO (2) TIMES A DAY. AS NEEDED    spironolactone (ALDACTONE) 25 mg tablet Take 25 mg by mouth daily.     allopurinoL (ZYLOPRIM) 300 mg tablet TAKE 1 TABLET BY MOUTH EVERY DAY    glimepiride (AMARYL) 4 mg tablet TAKE 1 TABLET WITH BREAKFAST OR THE FIRST MAIN MEAL OF THE DAY ONCE A DAY    metFORMIN ER (GLUCOPHAGE XR) 500 mg tablet two tabs with breakfast and two tabs with dinner    HUMALOG KWIKPEN INSULIN 100 unit/mL kwikpen INJECT 5 UNITS PER 15 GRAMS OF CARBS UP  UNITS PER DAY SUBCUTANEOUSLY    NOHEMY PEN NEEDLE 32 gauge x 5/32\" ndle USE AS DIRECTED FOR INSULIN INJECTIONS 4 TIMES A DAY    naproxen sodium (ALEVE) 220 mg tablet Take 220 mg by mouth as needed.  TRESIBA FLEXTOUCH U-200 200 unit/mL (3 mL) inpn 80 Units daily.  ibuprofen (ADVIL) 200 mg tablet Take  by mouth every six (6) hours as needed.  terbinafine HCl (LAMISIL) 1 % topical cream Apply  to affected area two (2) times a day. (Patient taking differently: Apply  to affected area two (2) times daily as needed.)    glucose blood VI test strips ([employer] CONTOUR) strip by Does Not Apply route See Admin Instructions. [employer] contour test strips, tests 2-4 times per day. Ordered by Dr. Farhan Pal.  Insulin Syringe-Needle U-100 (BD INSULIN SYRINGE ULT-FINE II) 1 mL 31 x 5/16\" syrg USE 4 TIMES A DAY AS DIRECTED    aspirin 81 mg tablet Take 81 mg by mouth daily. No current facility-administered medications for this visit. ROS:  - Symptoms of Hyperglycemia: RLS flares in the evenings and insomnia   - Symptoms of Hypoglycemia: jitteriness and shakiness/\"horrible\"    Episodes: begins to feel low in the 120s  - Patient Denies neuropathy, vision changes or any foot changes. Self Monitoring Blood Glucose (SMBG) or CGM:  - Brought in home glucometer/blood glucose log/CGM reader today:  no  - SMBG or CGM: SMBG  - Testing: sporadic fasting times (up at 3am -7am) and when she does not feel well  - Morning Fasting values reported in the 300s  - Patient is interested in CGM [facility]. She think it will help her dose her insulin better.       Nutrition/Lifestyle Modifications:  - Patient admits to a lifelong struggle in her relationship with food. She frequently binge eats and eats higher carbohydrate foods.     - Physical Activity:   - Her physical activity is limited due to comorbid conditions. Vitals: Wt Readings from Last 3 Encounters:   02/23/21 203 lb 12.8 oz (92.4 kg)   11/12/20 200 lb 3.2 oz (90.8 kg)   10/20/20 199 lb 6.4 oz (90.4 kg)     BP Readings from Last 3 Encounters:   02/23/21 136/62   11/12/20 105/64   10/20/20 110/72       LABS:    GFR est non-AA   Date Value Ref Range Status   02/23/2021 52 (L) >59 mL/min/1.73 Final       Lab Results   Component Value Date/Time    Cholesterol, total 162 02/23/2021 10:52 AM    HDL Cholesterol 55 02/23/2021 10:52 AM    LDL, calculated 79 02/23/2021 10:52 AM    LDL, calculated 82 10/11/2018 10:28 AM    VLDL, calculated 28 02/23/2021 10:52 AM    VLDL, calculated 15 10/11/2018 10:28 AM    Triglyceride 166 (H) 02/23/2021 10:52 AM       Lab Results   Component Value Date/Time    Microalb/Creat ratio (ug/mg creat.) 40 (H) 10/20/2020 12:04 PM         Diabetes Health Maintenance:  Pneumovax 23 vaccine: 10/2020  Prevnar-13 vaccine: 09/2015  COVID vaccine: yes second dose due 3/27  Shingrix: yes  ASA Therapy: yes  ACE/ARB Therapy: yes  Statin Therapy: yes      A/P:    Diabetes Management:   Assessment:  - Per ADA guidelines, Patient's A1c is not at goal of < 7-8 %(adjusted to patient age and comorbid conditions) mainly due to non-adherence to medications  - Patient did not bring SMBG for evaluation but reports values regularly above goal in the 300-400s.     Plan:  - Continue Tresiba 80 units daily, Metformin  mg 2 tab twice a day, Glimepiride 4 mg every morning with main meal and Humalog 5:15 carb with meals as directed  -  Educated patient on importance of adherence and focused on addressing barriers so that future changes can be made safely.  - Recommended CGM device for patient to trial.  Patient was educated and trained on WoofRadaray 2 reader - sample given. Application demonstration performed and sample sensor was applied to her arm.  - Recommend scanning 4 to 5 times daily (atleast every 8 hours) with Freestyle [place] 2 reader.  - Discussed patients remaining medication options. Agree to avoid GLP1-RA due to pancreatitis risk. Patient was educated about the kidney protective value of SGLT2i but will refrain for now to potential yeast infection complications. Medication Adherence:  - Recommend patient can try to take all her metformin XR once daily if she forgets a morning or evening dose  - Recommend patient keep pen needle tip in her pill compartment as a reminder to take her insulin doses  - Recommend patient carry a small stash of her morning pills in her purse in case she leaves the house for the day without taking them. - Recommend patient place her morning tresiba with her husbands lunch box where she makes his lunch every morning as a reminder. Jeronimo Connelly does not require refrigeration when the pen is in use    Medication reconciliation was completed during the visit. Patient verbalized understanding of informations and all questions were answered. AVS given with patient advised to contact office with questions/concerns. Notifications of recommendations will be sent to Dr. Paulie Bolanos MD for review. Patient will follow up in 2 week to review Freestyle [place] data. Thank you for the consult,  Zaida Larson, PharmD  Clinical Pharmacist Specialist    CLINICAL PHARMACY CONSULT: MED RECONCILIATION/REVIEW ADDENDUM    For Pharmacy Admin Tracking Only    PHSO: PHSO Patient?: Yes  Total # of Interventions Recommended: Count: 4  - New Order #: 2 New Medication Order Reason(s): Needs Additional Medication Therapy  Total Interventions Accepted: 4  Time Spent (min): 75

## 2021-03-17 RX ORDER — FLASH GLUCOSE SENSOR
KIT MISCELLANEOUS
Qty: 2 KIT | Refills: 3 | Status: SHIPPED | OUTPATIENT
Start: 2021-03-17 | End: 2021-07-27

## 2021-03-18 ENCOUNTER — TELEPHONE (OUTPATIENT)
Dept: INTERNAL MEDICINE CLINIC | Age: 66
End: 2021-03-18

## 2021-03-18 DIAGNOSIS — R80.9 MICROALBUMINURIA: ICD-10-CM

## 2021-03-18 DIAGNOSIS — E11.29 TYPE 2 DIABETES MELLITUS WITH MICROALBUMINURIA, WITH LONG-TERM CURRENT USE OF INSULIN (HCC): Primary | ICD-10-CM

## 2021-03-18 DIAGNOSIS — R80.9 TYPE 2 DIABETES MELLITUS WITH MICROALBUMINURIA, WITH LONG-TERM CURRENT USE OF INSULIN (HCC): Primary | ICD-10-CM

## 2021-03-18 DIAGNOSIS — Z79.4 TYPE 2 DIABETES MELLITUS WITH MICROALBUMINURIA, WITH LONG-TERM CURRENT USE OF INSULIN (HCC): Primary | ICD-10-CM

## 2021-03-18 NOTE — TELEPHONE ENCOUNTER
Pharmacy Progress Note - Telephone Encounter     Ms. Abby Vu 72 y.o. female, referred by Dr. Brant Correia MD, for diabetes management was contacted today to check in after applications of Freestyle [place] sensors. Dr. Faby Majano has sent order in for refill sensors to [facility], which is ready for  and will cost patient $39.99 through insurance for month supply. I contacted Ms. Edward Garcia and she reports loving her new sensor device. She has not had an low event. She has found the high alarms very useful to evaluate her foods and medications. She is able to afford the sensors at that price and plans to continue with this therapy. She feels confident she will be able to apply future sensor. She does mention that she has a CT scan scheduled in a few days. It is recommended by the  that she remove the sensor prior to her scan as it may interfere with the image and/or cause damage to the sensor that may cause inaccurate readings. Patient is aware that after removal she will not be able to reapply the same sensor so she will  the order ready at the pharmacy. She is scheduled for follow up with Chetan Diehl on 3/30/21 to review her readings. Thank you for consult,    Zaida Parish  Clinical Pharmacist Specialist    CLINICAL PHARMACY CONSULT: MED RECONCILIATION/REVIEW ADDENDUM    For Pharmacy Admin Tracking Only    PHSO: PHSO Patient?: Yes  Total # of Interventions Recommended: Count: 2  Total Interventions Accepted: 2  Time Spent (min): 20

## 2021-03-27 ENCOUNTER — IMMUNIZATION (OUTPATIENT)
Dept: INTERNAL MEDICINE CLINIC | Age: 66
End: 2021-03-27
Payer: COMMERCIAL

## 2021-03-27 DIAGNOSIS — Z23 ENCOUNTER FOR IMMUNIZATION: Primary | ICD-10-CM

## 2021-03-27 PROCEDURE — 91300 COVID-19, MRNA, LNP-S, PF, 30MCG/0.3ML DOSE(PFIZER): CPT | Performed by: FAMILY MEDICINE

## 2021-03-27 PROCEDURE — 0002A COVID-19, MRNA, LNP-S, PF, 30MCG/0.3ML DOSE(PFIZER): CPT | Performed by: FAMILY MEDICINE

## 2021-03-30 ENCOUNTER — OFFICE VISIT (OUTPATIENT)
Dept: INTERNAL MEDICINE CLINIC | Age: 66
End: 2021-03-30

## 2021-03-30 DIAGNOSIS — Z79.4 TYPE 2 DIABETES MELLITUS WITH MICROALBUMINURIA, WITH LONG-TERM CURRENT USE OF INSULIN (HCC): Primary | ICD-10-CM

## 2021-03-30 DIAGNOSIS — R80.9 TYPE 2 DIABETES MELLITUS WITH MICROALBUMINURIA, WITH LONG-TERM CURRENT USE OF INSULIN (HCC): Primary | ICD-10-CM

## 2021-03-30 DIAGNOSIS — E11.29 TYPE 2 DIABETES MELLITUS WITH MICROALBUMINURIA, WITH LONG-TERM CURRENT USE OF INSULIN (HCC): Primary | ICD-10-CM

## 2021-04-01 NOTE — PROGRESS NOTES
CC:  Diabetes management/education    S/O: Evangelina Vences is a 72 y.o. female referred by Dr. Valentin Avendaño MD for diabetes management. HPI: Patient is here for 2 week follow up to see how things are going since our last visit. At the last visit, we had a very detailed discussion about each of her medications roles, how they work, importance of adherence and tips/tricks on how to be more adherence. Patient is very proud to report that she has not missed a single dose of medication since our last visit, including her insulin. Patient is planning to schedule an appt with her endocrinologist next month. Current Diabetes Regimen:  Tresiba U-200 80 units once daily  Humalog 5 units/15 gram - usually using 15+ units with meals  -if sugars are higher will sometimes redose about 3 hours later with more Humalog  Metformin ER 500mg 2 tabs twice daily  Glimepiride 5mg once daily    ROS:   Patient has had a few times where her sugar has dropped into the <70 range - usually when the sugars were higher and she gave herself extra Humalog without a meal. Patient denies  hyperglycemic signs/symptoms, chest pain, shortness of breath, neuropathy, vision changes, and any foot changes.     Self-Monitoring Blood Glucose:  Using freestyle citlalli 2  14 day averages  12am-6am - 180 - 1 low  6am -12pm - 163 - 3 lows  12pm-6pm - 144 - no lows  6pm-12am - 183 - 1 low    Time in range - last 14 days (range set at )  Above: 35%  In [employer] 64$  Below 1%      Diet:  Using CGM has made her much more aware of what she's eating and how it impacts her blood sugar  Is trying to be more aware of portions, getting in protein and non-starchy vegetables  Notes that when she had spaghetti at restaurant that she did order a big salad    Exercise:  [place]  Notes that she can't do as much as she used to because of her back  Still does some walking to open ugarte and help her         Data reviewed:    Lab Results   Component Value Date/Time    Hemoglobin A1c 11.4 (H) 02/23/2021 10:52 AM    Hemoglobin A1c 11.6 (H) 10/20/2020 12:04 PM    Hemoglobin A1c 10.7 (H) 10/11/2018 10:28 AM    Hemoglobin A1c (POC) 11.6 10/10/2014 09:25 AM    Hemoglobin A1c (POC) 10.3 04/10/2014 08:52 AM    Hemoglobin A1c (POC) 9.8 11/13/2013 10:00 AM    Hemoglobin A1c, External 10.4 05/12/2020    Hemoglobin A1c, External 10.4 05/11/2020    Hemoglobin A1c, External 11.7 12/04/2019         Diabetes Health Maintenance:  Last eye exam: 12/9/20  Last foot exam: confirm with patient - no changes  Last influenza vaccine: 9/2020  Last Pneumovax 23 vaccine: 10/20/20  Last Prevnar-13 vaccine: 9/30/15  Hepatitis B Series: unknown  Covid-19 Vaccine: 3/6/21, 3/27/21  ASA Therapy: ASA 81mg  ACE/ARB Therapy: valsartan  Statin Therapy: simvastatin    Assessment/Plan:     1. Diabetes:  a1c not at goal <7% but also not indicative of current blood sugar readings. Per patients CGM, patient has made huge progress over the past 2 weeks just by being more adherent to medications. Advised patient to only use the Humalog with meals and not in between meals as this appears to be stacking and causing her to drop too low. Advised patient to really keep track of how much she's using/eating so that her insulin to carb ratio can be adjusted at her endocrine visit if needed. Since patient seeing endo, will defer med changes to them at this time but happy to help in future if needed. Will send Urszula's office link to patient for michelle Calderon so that we can see her information. ---Per pharmacy claims data it appears Erick Kumar was prescribed in the past, inquire with patient about whether medication was every filled/picked up/tried at future appointment      Patient verbalized understanding of the information presented and all of the patients questions were answered. AVS was handed to the patient and information reviewed.       Patient advised to call me or Dr. Nelson Kurtz, Shani Glasgow MD with any additional questions or concerns. Follow-up: with Urszula in about 1 month  Notification of recommendations will be sent to Dr. Nina Hare MD for review.       Apurva Vargas, PharmD, BCPS, [facility]    CLINICAL PHARMACY CONSULT: MED RECONCILIATION/REVIEW ADDENDUM    For Pharmacy Admin Tracking Only    PHSO: PHSO Patient?: Yes  Total # of Interventions Recommended: Count: 2  Total Interventions Accepted: 1  Time Spent (min): 45

## 2021-04-13 ENCOUNTER — TELEPHONE (OUTPATIENT)
Dept: INTERNAL MEDICINE CLINIC | Age: 66
End: 2021-04-13

## 2021-04-13 ENCOUNTER — PATIENT MESSAGE (OUTPATIENT)
Dept: INTERNAL MEDICINE CLINIC | Age: 66
End: 2021-04-13

## 2021-04-13 NOTE — TELEPHONE ENCOUNTER
Phone - Diabetes management/education    S/O: Calvin Griffin is a 72 y.o. female referred by Dr. Janeth Brown MD for diabetes management. HPI: Patient called today to briefly review blood sugars. She has an appt with endo at end of this month. Current Diabetes Regimen:  Tresiba U-200 80 units once daily  Humalog 5 units/15 gram - usually using 15+ units with meals  -if sugars are higher will sometimes redose about 3 hours later with more Humalog  Metformin ER 500mg 4 tablets once daily  Glimepiride 4mg once daily in the morning    ROS:   Patient denies hypoglycemic and hyperglycemic signs/symptoms, chest pain, shortness of breath, neuropathy, vision changes, and any foot changes. Self-Monitoring Blood Glucose:    Patient notes that when Stephan Noriega says blood sugars are low, she has been performing finger sticks and they are ~20 points higher. This is expected for the difference in the type of blood sugar measurements but is also indicating that patient is not dropping too low. Data reviewed:     Lab Results   Component Value Date/Time    Hemoglobin A1c 11.4 (H) 02/23/2021 10:52 AM    Hemoglobin A1c 11.6 (H) 10/20/2020 12:04 PM    Hemoglobin A1c 10.7 (H) 10/11/2018 10:28 AM    Hemoglobin A1c (POC) 11.6 10/10/2014 09:25 AM    Hemoglobin A1c (POC) 10.3 04/10/2014 08:52 AM    Hemoglobin A1c (POC) 9.8 11/13/2013 10:00 AM    Hemoglobin A1c, External 10.4 05/12/2020    Hemoglobin A1c, External 10.4 05/11/2020    Hemoglobin A1c, External 11.7 12/04/2019       Assessment/Plan:     1. Diabetes:  a1c not at goal <7% per ADA guidelines however patients sugars have improved drastically. Although Cain Barbosa is showing 5% hypoglycemia, this is actually falsely elevated as patient has been doing finger sticks which show it ~20 points higher at these times. This is the range where the CGM can be less accurate and why we still encourage fingersticks to make sure we aren't missing anything.  We did not make any changes to her medications but rather spent time discussing how the insulins work and the importance of adherence. Patient has done so well with taking all of her insulin doses and utilizing the freestyle citlalli to guide food decision. Patient to reach out to [employer] in 2 weeks to review blood sugars numbers and to ensure they have connected on the freestyle [place]. Patient verbalized understanding of the information presented and all of the patients questions were answered. AVS was handed to the patient and information reviewed. Patient advised to call me or Dr. Maricarmen Rashid MD with any additional questions or concerns. Follow-up: 2 weeks  Notification of recommendations will be sent to Dr. Maricarmen Rashid MD for review.       Scout Singh, PharmD, BCPS, CDCES    CLINICAL PHARMACY CONSULT: MED RECONCILIATION/REVIEW ADDENDUM    For Pharmacy Admin Tracking Only    PHSO: PHSO Patient?: Yes  Total # of Interventions Recommended: Count: 1  Total Interventions Accepted: 1  Time Spent (min): 15

## 2021-04-13 NOTE — TELEPHONE ENCOUNTER
Pharmacy Progress Note - Telephone Encounter    Spoke with pt about [employer] numbers. Doing well. Pt will call this writer in about 2 weeks to update on BG results.     Inna Muir, PharmD, [facility]  Clinical Pharmacist Specialist      CLINICAL PHARMACY CONSULT: MED RECONCILIATION/REVIEW ADDENDUM    For Pharmacy Admin Tracking Only    PHSO: PHSO Patient?: Yes  Total # of Interventions Recommended: Count: 1  Total Interventions Accepted: 1  Time Spent (min): 5

## 2021-04-20 RX ORDER — ALLOPURINOL 300 MG/1
TABLET ORAL
Qty: 30 TAB | Refills: 6 | Status: SHIPPED | OUTPATIENT
Start: 2021-04-20 | End: 2022-03-04

## 2021-04-29 ENCOUNTER — TELEPHONE (OUTPATIENT)
Dept: INTERNAL MEDICINE CLINIC | Age: 66
End: 2021-04-29

## 2021-04-29 NOTE — TELEPHONE ENCOUNTER
Pharmacy Progress Note - Telephone Encounter    S/O: Ms. Calvin Griffin 77 y.o. female, referred by Dr. Janeth Brown MD, was contacted via an outbound telephone call to discuss diabetes management - CGM today. Verified patients identifiers (name & ) per HIPAA policy. - Pt reports seeing Endocrinologist (Dr. Sharron Guaman) last Friday. Endo liked CGM numbers but said that they \"still need work\". Morris Boots was started. Pt took new med for the first time 4 days ago. Endorses increased urination which is expected with SGLT2s. Pt is returning to Endo in 6 weeks for f/u. Labs were taken; however, results have not come back. - In the past 2 weeks, pt had one episode of hypoglycemia which resolved after treating with food. Pt is learning what to eat/not to eat. Had pizza last night and BG increased significantly. Fasting BG this AM was 200s. Interested in learning more about diet choices and meal plans. Using an rob - [employer] - to learn more. Meal plan recommendations have been \"too fancy\" and she wants other recommendations. Pt reports Endo wants her to eat less carbs and therefore needing less insulin. Current DM Regimen  Tresiba U-200 80 units once daily  Humalog 5 units/15 gram - usually using 15+ units with meals  -if sugars are higher will sometimes redose about 3 hours later with more Humalog  Metformin ER 500mg 4 tablets once daily  Glimepiride 4mg once daily in the morning  Jardiance 10 mg every day (new med added to medication list)    ROS:   Patient denies hyperglycemic signs/symptoms, chest pain, shortness of breath, neuropathy, vision changes, and any foot changes.     Self-Monitoring Blood Glucose:       Data reviewed:             Lab Results   Component Value Date/Time     Hemoglobin A1c 11.4 (H) 2021 10:52 AM     Hemoglobin A1c 11.6 (H) 10/20/2020 12:04 PM     Hemoglobin A1c 10.7 (H) 10/11/2018 10:28 AM     Hemoglobin A1c (POC) 11.6 10/10/2014 09:25 AM     Hemoglobin A1c (POC) 10.3 04/10/2014 08:52 AM     Hemoglobin A1c (POC) 9.8 11/13/2013 10:00 AM     Hemoglobin A1c, External 10.4 05/12/2020     Hemoglobin A1c, External 10.4 05/11/2020     Hemoglobin A1c, External 11.7 12/04/2019       A/P:  - Most recent A1c is uncontrolled. CGM rdgs have improved over the past few months. Continuing to learn more about food choices. Discussed food choices and recommended carb intake per meal and per snack. Discussed not skipping meals in order to maintain more control on food choices throughout the day. - Continue medications as prescribed by endo  - Encouraged <30 gm of carbs per meal and <15 gm of carbs per snack   - Patient endorses understanding to the provided information. All questions answered at this time. Scheduled f/u visits on 5/20/21 with PCP at [address] [place] [address] and PharmD at 8:30AM    There are no discontinued medications. No orders of the defined types were placed in this encounter. Mena Garrett, PharmD, [facility]  Clinical Pharmacist Specialist      For Pharmacy Admin Tracking Only     CPA in place: YES    Recommendation Provided To: Patient/Caregiver: 1 via Telephone   Intervention Detail: Scheduled Appointment   Gap Closed?  NO    Total # of Interventions Recommended: 1   Total # of Interventions Accepted: 1   Intervention Accepted By: Patient/Caregiver: 1   Time Spent (min): 30

## 2021-04-30 ENCOUNTER — TELEPHONE (OUTPATIENT)
Dept: FAMILY MEDICINE CLINIC | Age: 66
End: 2021-04-30

## 2021-04-30 NOTE — TELEPHONE ENCOUNTER
Pharmacy Progress Note - Telephone Encounter    S/O: Ms. Oniel Jordan 77 y.o. female, referred by Dr. Namrata Lo MD, called this writer to discuss A1c results today. Verified patients identifiers (name & ) per HIPAA policy. - Pt reported her A1c results from her endocrinologist (Dr. Kurtis Andrews) came in. A1c is 10%. This is lower than previous result (11.4% - 2021). Pt reported feeling disappointed that result wasn't lower; however, she did reflect that it was improved. A1c was drawn approximately 2 months after most recent result instead of 3 months. This writer discussed that her BG rdgs and A1c results may not match up and in a month her A1c is likely to be even lower. A/P:  - Encouraged pt to keep up the great work. - Appt scheduled with PharmD for f/u on 21 - same day as PCP  - Patient endorses understanding to the provided information. All questions answered at this time. There are no discontinued medications. No orders of the defined types were placed in this encounter. Chano Yeh, PharmD, [facility] [place] [facility]  Clinical Pharmacist Specialist      For Pharmacy Admin Tracking Only     CPA in place: YES    Gap Closed?  NO    Total # of Interventions Recommended: 0   Total # of Interventions Accepted: 0   Intervention Accepted By: Patient/Caregiver: 0   Time Spent (min): 5

## 2021-05-12 ENCOUNTER — DOCUMENTATION ONLY (OUTPATIENT)
Dept: INTERNAL MEDICINE CLINIC | Age: 66
End: 2021-05-12

## 2021-05-12 LAB — HBA1C MFR BLD HPLC: 10 %

## 2021-05-20 ENCOUNTER — OFFICE VISIT (OUTPATIENT)
Dept: INTERNAL MEDICINE CLINIC | Age: 66
End: 2021-05-20

## 2021-05-20 VITALS
SYSTOLIC BLOOD PRESSURE: 114 MMHG | TEMPERATURE: 97.5 F | HEIGHT: 65 IN | WEIGHT: 206.6 LBS | BODY MASS INDEX: 34.42 KG/M2 | OXYGEN SATURATION: 98 % | HEART RATE: 70 BPM | RESPIRATION RATE: 16 BRPM | DIASTOLIC BLOOD PRESSURE: 72 MMHG

## 2021-05-20 DIAGNOSIS — F32.5 MAJOR DEPRESSIVE DISORDER WITH SINGLE EPISODE, IN FULL REMISSION (HCC): ICD-10-CM

## 2021-05-20 DIAGNOSIS — E78.5 HYPERLIPIDEMIA WITH TARGET LDL LESS THAN 100: ICD-10-CM

## 2021-05-20 DIAGNOSIS — E66.01 SEVERE OBESITY WITH BODY MASS INDEX (BMI) OF 35.0 TO 39.9 WITH SERIOUS COMORBIDITY (HCC): ICD-10-CM

## 2021-05-20 DIAGNOSIS — R80.9 TYPE 2 DIABETES MELLITUS WITH MICROALBUMINURIA, WITH LONG-TERM CURRENT USE OF INSULIN (HCC): Primary | ICD-10-CM

## 2021-05-20 DIAGNOSIS — E11.29 TYPE 2 DIABETES MELLITUS WITH MICROALBUMINURIA, WITH LONG-TERM CURRENT USE OF INSULIN (HCC): Primary | ICD-10-CM

## 2021-05-20 DIAGNOSIS — Z79.4 TYPE 2 DIABETES MELLITUS WITH MICROALBUMINURIA, WITH LONG-TERM CURRENT USE OF INSULIN (HCC): Primary | ICD-10-CM

## 2021-05-20 DIAGNOSIS — I10 ESSENTIAL HYPERTENSION: ICD-10-CM

## 2021-05-20 PROCEDURE — G9899 SCRN MAM PERF RSLTS DOC: HCPCS | Performed by: INTERNAL MEDICINE

## 2021-05-20 PROCEDURE — 1090F PRES/ABSN URINE INCON ASSESS: CPT | Performed by: INTERNAL MEDICINE

## 2021-05-20 PROCEDURE — 1101F PT FALLS ASSESS-DOCD LE1/YR: CPT | Performed by: INTERNAL MEDICINE

## 2021-05-20 PROCEDURE — G8754 DIAS BP LESS 90: HCPCS | Performed by: INTERNAL MEDICINE

## 2021-05-20 PROCEDURE — G8427 DOCREV CUR MEDS BY ELIG CLIN: HCPCS | Performed by: INTERNAL MEDICINE

## 2021-05-20 PROCEDURE — 3017F COLORECTAL CA SCREEN DOC REV: CPT | Performed by: INTERNAL MEDICINE

## 2021-05-20 PROCEDURE — G8752 SYS BP LESS 140: HCPCS | Performed by: INTERNAL MEDICINE

## 2021-05-20 PROCEDURE — 3046F HEMOGLOBIN A1C LEVEL >9.0%: CPT | Performed by: INTERNAL MEDICINE

## 2021-05-20 PROCEDURE — G8536 NO DOC ELDER MAL SCRN: HCPCS | Performed by: INTERNAL MEDICINE

## 2021-05-20 PROCEDURE — 99214 OFFICE O/P EST MOD 30 MIN: CPT | Performed by: INTERNAL MEDICINE

## 2021-05-20 PROCEDURE — G9717 DOC PT DX DEP/BP F/U NT REQ: HCPCS | Performed by: INTERNAL MEDICINE

## 2021-05-20 PROCEDURE — G8417 CALC BMI ABV UP PARAM F/U: HCPCS | Performed by: INTERNAL MEDICINE

## 2021-05-20 PROCEDURE — 2022F DILAT RTA XM EVC RTNOPTHY: CPT | Performed by: INTERNAL MEDICINE

## 2021-05-20 PROCEDURE — G8399 PT W/DXA RESULTS DOCUMENT: HCPCS | Performed by: INTERNAL MEDICINE

## 2021-05-20 RX ORDER — PUMPKIN SEED EXTRACT/SOY GERM 300 MG
1 CAPSULE ORAL DAILY
COMMUNITY

## 2021-05-20 NOTE — PROGRESS NOTES
Pharmacy Progress Note - Diabetes Management    S/O: Ms. Beverley Olguin is a 77 y.o. female, referred by Dr. Jeny Cuellar MD, with a PMH of T2DM, HTN, HLD, obesity, depression, was seen today for diabetes management. Patient's last A1c was 10.0% (May 2021) which is improved from 11.4% (Feb 2021). Interim update: Pt had visit with endocrinologist this past month. No changes were made to therapy. Has been using CGM. Recently had issue with abnormal sensor rdgs. Sensor rdg 45s or \"LOW\". Pt completed BG fingersticks - 70s mg/dL. Pt contacted [facility] [place] ) for replacement sensor. She has not replaced device. New sensor came in the mail today. Her rdgs have also been running high lately despite eating lower carb meals. When she did have a slice of apple pie the sensor rdg continued to increase even after injecting appropriate amount of insulin for carb intake. Pt injected additional 20 units which started to decrease CGM rdgs. These rdgs occurred with the malfunctioning sensor. SHx:  - [place]    Medication Adherence/Access:  - Started Triny Edyta (4/23/21). Sample provided by endocrinologist.  $10 copay with insurance. Current anti-hyperglycemic regimen includes:    - Metformin  mg 4 tabs with dinner  - Tresiba U-200 80 units every day  - Humalog U-100 inject 5 units per 15 gm of carbs up to 100 units per day  - Glimepiride 4 mg every day   - Jardiance 10 mg every day     ROS:  Today, Pt endorses:  - Symptoms of Hyperglycemia: none  - Symptoms of Hypoglycemia: sweating - occurs when CGM states 50 mg/dL, checks finger sticks and it's 70 mg/dL. Treats with nabs and sx resolve. This occurs a few times a month. - Experiencing frequent urination with start of Jardiance; however, pt has also increased water intake to avoid side effect of UTI.     Self Monitoring Blood Glucose (SMBG) or CGM:        Nutrition:  - Pt states that she would like suggestions for snacks. She has been eating peanut butter nabs and wants to have lower carb options. 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 Has been gardening - planted tomatoes, beets, green peppers. Planning to plant onions soon. Lulu Garcia in the back of parking lots to make herself walk further.  - Exercise has been difficult d/t degenerative joint disease - seeing chiro for treatment and taking Advil regularly - BID      Diabetes Health Maintenance:  · ASCVD Risk Factors: Age and Diabetes  · ASA therapy:  ASA 81 mg   · ACE/ARB therapy: Valsartan 80 mg  · Optimized statin therapy: Simvastatin 40 mg  · ASCVD Risk Score: 11.4%; LDL: 95 mg/dL (4/23/21)   · Nicotine dependence: No  · Last eye exam: 12/9/20  · Last foot exam: will discuss at next visit  · Last influenza vaccine: 9/4/20  · Last Pneumovax 23 vaccine: 11/15/85, 10/20/20  · Last Prevnar-13 vaccine: 9/30/15  · Hepatitis B Series: unknown  · COVID-19 vaccine: 3/6/21, 3/27/21      Vitals:   Wt Readings from Last 3 Encounters:   05/20/21 206 lb 9.6 oz (93.7 kg)   02/23/21 203 lb 12.8 oz (92.4 kg)   11/12/20 200 lb 3.2 oz (90.8 kg)     BP Readings from Last 3 Encounters:   02/23/21 136/62   11/12/20 105/64   10/20/20 110/72     Pulse Readings from Last 3 Encounters:   02/23/21 65   11/12/20 70   10/20/20 81       Past Medical History:   Diagnosis Date    Adverse effect of anesthesia     woke during surgical procedure with MAC and cath    Allergic rhinitis     Asthma     mild    Calculus 2.28/11    EXCESS URIC ACID; HAS STONES CURRENTLY    Chronic pain     Color vision deficiency     Depression     Diabetes ([facility] [address]     Hypercholesterolemia     Hypertension     Ill-defined condition     pneumonia /bronchitis hx    Ill-defined condition 2020    pancreatitis    Incisional hernia 10/17/2012    Pancreatitis 1969    CYST ON PANCREAS BURST BEFORE SURG FOR REMOVAL    Trigger thumb of left hand     and right hand     Allergies   Allergen Reactions    Adhesive Tape Contact Dermatitis     Blistering UNDER STERI-STRIPS    Contrast Agent [Iodine] Hives    Pcn [Penicillins] Hives    Levaquin [Levofloxacin] Nausea and Vomiting    Niacin Hives and Itching    Shellfish Derived Hives and Swelling     CRAB       Current Outpatient Medications   Medication Sig    L. liam-L. gavin-L. carlitos-L. rham (AZO Complete Feminine Balance) 5 billion cell cap Take 1 Tablet by mouth daily.  empagliflozin (Jardiance) 10 mg tablet Take 10 mg by mouth daily.  allopurinoL (ZYLOPRIM) 300 mg tablet TAKE 1 TABLET BY MOUTH EVERY DAY    flash glucose sensor (FreeStyle Col[place] 2 Sensor) kit Apply sensor to arm to check blood sugar and change every 14 days as directed    Tiadylt  mg capsule TAKE 1 CAPSULE BY MOUTH EVERY DAY    omeprazole (PRILOSEC) 20 mg capsule TAKE 1 CAPSULE BY MOUTH EVERY DAY    valsartan (DIOVAN) 80 mg tablet TAKE 1 TABLET BY MOUTH EVERY DAY    simvastatin (ZOCOR) 40 mg tablet TAKE 1 TABLET BY MOUTH EVERYDAY AT BEDTIME    fenofibrate (LOFIBRA) 54 mg tablet TAKE 1 TABLET BY MOUTH EVERY DAY    escitalopram oxalate (LEXAPRO) 20 mg tablet Take 1 Tab by mouth daily.  clobetasoL (TEMOVATE) 0.05 % topical cream two (2) times daily as needed.  acetaminophen (Tylenol Extra Strength) 500 mg tablet Take  by mouth every six (6) hours as needed for Pain.  nystatin (Nystop) powder APPLY TO AFFECTED AREA TWO (2) TIMES A DAY. AS NEEDED    spironolactone (ALDACTONE) 25 mg tablet Take 25 mg by mouth daily.  glimepiride (AMARYL) 4 mg tablet TAKE 1 TABLET WITH BREAKFAST OR THE FIRST MAIN MEAL OF THE DAY ONCE A DAY    metFORMIN ER (GLUCOPHAGE XR) 500 mg tablet Take 2,000 mg by mouth daily (with dinner).     HUMALOG KWIKPEN INSULIN 100 unit/mL kwikpen INJECT 5 UNITS PER 15 GRAMS OF CARBS UP  UNITS PER DAY SUBCUTANEOUSLY    NOHEMY PEN NEEDLE 32 gauge x 5/32\" ndle USE AS DIRECTED FOR INSULIN INJECTIONS 4 TIMES A DAY    naproxen sodium (ALEVE) 220 mg tablet Take 220 mg by mouth as needed.  TRESIBA FLEXTOUCH U-200 200 unit/mL (3 mL) inpn 80 Units daily.  ibuprofen (ADVIL) 200 mg tablet Take  by mouth every six (6) hours as needed.  terbinafine HCl (LAMISIL) 1 % topical cream Apply  to affected area two (2) times a day. (Patient taking differently: Apply  to affected area two (2) times daily as needed.)    glucose blood VI test strips ([employer] CONTOUR) strip by Does Not Apply route See Admin Instructions. [employer] contour test strips, tests 2-4 times per day. Ordered by Dr. Mag Benson.  Insulin Syringe-Needle U-100 (BD INSULIN SYRINGE ULT-FINE II) 1 mL 31 x 5/16\" syrg USE 4 TIMES A DAY AS DIRECTED    aspirin 81 mg tablet Take 81 mg by mouth daily. No current facility-administered medications for this visit. Lab Results   Component Value Date/Time    Sodium 137 02/23/2021 10:52 AM    Potassium 4.8 03/03/2021 01:33 PM    Chloride 101 02/23/2021 10:52 AM    CO2 22 02/23/2021 10:52 AM    Anion gap 13 04/16/2019 01:32 PM    Glucose 166 (H) 02/23/2021 10:52 AM    BUN 27 02/23/2021 10:52 AM    Creatinine 1.11 (H) 02/23/2021 10:52 AM    BUN/Creatinine ratio 24 02/23/2021 10:52 AM    GFR est AA 60 02/23/2021 10:52 AM    GFR est non-AA 52 (L) 02/23/2021 10:52 AM    Calcium 9.8 02/23/2021 10:52 AM    Bilirubin, total 0.3 02/23/2021 10:52 AM    Alk.  phosphatase 71 02/23/2021 10:52 AM    Protein, total 6.4 02/23/2021 10:52 AM    Albumin 4.0 02/23/2021 10:52 AM    Globulin 4.0 04/16/2019 01:32 PM    A-G Ratio 1.7 02/23/2021 10:52 AM    ALT (SGPT) 12 02/23/2021 10:52 AM     Lab Results   Component Value Date/Time    Cholesterol, total 162 02/23/2021 10:52 AM    HDL Cholesterol 55 02/23/2021 10:52 AM    LDL, calculated 79 02/23/2021 10:52 AM    LDL, calculated 82 10/11/2018 10:28 AM    VLDL, calculated 28 02/23/2021 10:52 AM    VLDL, calculated 15 10/11/2018 10:28 AM    Triglyceride 166 (H) 02/23/2021 10:52 AM     Lab Results   Component Value Date/Time    WBC 8.0 10/20/2020 12:04 PM    HGB 12.7 10/20/2020 12:04 PM    HCT 38.7 10/20/2020 12:04 PM    PLATELET 195 11/21/8227 12:04 PM    MCV 83 10/20/2020 12:04 PM       Lab Results   Component Value Date/Time    Microalb/Creat ratio (ug/mg creat.) 40 (H) 10/20/2020 12:04 PM       Lab Results   Component Value Date/Time    Hemoglobin A1c 11.4 (H) 02/23/2021 10:52 AM    Hemoglobin A1c 11.6 (H) 10/20/2020 12:04 PM    Hemoglobin A1c 10.7 (H) 10/11/2018 10:28 AM    Hemoglobin A1c, External 10.0 04/24/2021 12:00 AM    Hemoglobin A1c, External 10.0 04/23/2021 12:00 AM    Hemoglobin A1c, External 10.4 05/12/2020 12:00 AM     Hemoglobin A1c (POC)   Date Value Ref Range Status   10/10/2014 11.6 % Final        CrCl cannot be calculated (Unknown ideal weight. ). A/P:    1) T2DM: Per ADA guidelines, Pt's A1c is not at goal of < 7%; however, CGM rdgs are much improved compared to most recent A1c. Current rdgs are higher than they have been in the past; however, this may be d/t a malfunctioning sensor. Pt advised to replace sensor today. Pt requested recipes/snack ideas that are low carb. Provided recommendations from ADA. Will not make changes to regimen as endo is managing. Pt has f/u in 3 months.  - Continue Tresiba 80 units every day  - Continue Humalog 5 units per 15 gm of carb  - Continue Metformin  mg 4 tabs QPM dinner  - Continue Glimepiride 4 mg every day  - Encouraged low carb snacks - recs provided  - Encouraged increase in exercise    2) HTN: BP is at goal of < 130/80. Currently taking CCB, ARB, and K-sparing diuretic. ARB is first line in tx of HTN in DM. Pt has evidence of microalbuminuria. No change in therapy required at this time.   - Tiadylt  mg every day  - Valsartan 80 mg every day  - Spironolactone 25 mg every day    3) Primary Prevention ASCVD: Per ADA guidelines, pts age 43-69 yrs are recommended for statin therapy. Currently taking moderate intensity statin and fenofibrate. LDL <100 mg/dL. Low dose ASA appropriate for heart health. - Continue Simvastatin 40 mg every day  - Fenofibrate 54 mg every day   - Continue ASA 81 mg every day     Established patient-centered goal(s) to follow up on at the next visit:    - 150 min/wk of exercise    Resources provided:  - ADA recipe recommendations  - low carb snack idease      Medication reconciliation was completed during the visit. There are no discontinued medications. No orders of the defined types were placed in this encounter. Patient verbalized understanding of the information presented and all of the patients questions were answered. AVS was handed to the patient. Patient advised to call the office with any additional questions or concerns. Notifications of recommendations will be sent to Dr. Shiva Mayen MD for review. Patient will return to clinic in 4 week(s) for follow up. Isaías Mooney, PharmD, BCGP  Clinical Pharmacist Specialist          For Pharmacy Admin Tracking Only     CPA in place:  Yes   Recommendation Provided To: Patient/Caregiver: 2 via In person   Intervention Detail: Adherence Monitorin and Scheduled Appointment   Gap Closed?: No   Total # of Interventions Recommended: 2   Total # of Interventions Accepted: 2   Intervention Accepted By: Patient/Caregiver: 2   Time Spent (min): 45

## 2021-05-20 NOTE — PATIENT INSTRUCTIONS
Your A1c was 10.0% which was elevated. Your blood sugars from the Freestyle look much better than your A1c. American Diabetes Association Recipe: https://[employer]/. org/healthy-living/recipes-nutrition Diabetes Snack Ideas: 
- hard-boiled eggs and pickles - carrots or celery and hummus - peanut butter and apples - peanuts/almonds - nuts 
- tuna and crackers 
- small bag of popcorn Blood Sugar Goal 
Fastin-130 mg/dL 
1-2 after meals: Less than 180 mg/dL Carbohydrate Goals Meal: 30-45 grams Snacks: 15 grams Personal Goals: 
- 150 minutes exercise per week Pharmacist Contact Information: 
Ferdinand Escamilla, PharmD, BCGP Work Cell: [phone]

## 2021-05-20 NOTE — PROGRESS NOTES
Maureen Aguilera is a 77 y.o. female who was seen today for a follow-up visit. Assessment & Plan:   Diagnoses and all orders for this visit:    1. Type 2 diabetes mellitus with microalbuminuria, with long-term current use of insulin (HCC)  Doing much better and a1c still not at goal but improving her BS with current regimen. She will be due for repeat a1c in 2 months to be done by endocrine. 2. Major depressive disorder with single episode, in full remission ([address]  Well controlled, continue current medication. 3. Severe obesity with body mass index (BMI) of 35.0 to 39.9 with serious comorbidity ([address]  Counseled strategies. 4. Hyperlipidemia with target LDL less than 100  Reviewed labs and Well controlled, continue current medication. 5. Essential hypertension  Well controlled, continue current medication. lab results and schedule of future lab studies reviewed with patient. follow up 3 months. Subjective:   Maureen Aguilera was seen for Hypertension,  DM with microalbuminuria and hyperlipidemia, depression. She now has the Glycobia sensor and loves it. No low BS. She is also followed by endocrine Dr Ermias Nogueira. Blood sugars rangin-170 range on current regimen. Also working with pharm D   Blood pressures ranging: not checking recently. Taking medication with no side effects. Review of Systems   Respiratory: Negative for shortness of breath. Cardiovascular: Negative for chest pain and leg swelling. Gastrointestinal: Negative for abdominal pain. - per HPI    Physical Exam  Vitals and nursing note reviewed. Constitutional:       General: She is not in acute distress. Appearance: She is well-developed. HENT:      Head: Normocephalic and atraumatic. Cardiovascular:      Rate and Rhythm: Normal rate and regular rhythm. Pulmonary:      Effort: Pulmonary effort is normal.      Breath sounds: Normal breath sounds. No wheezing.    Abdominal:      General: Bowel sounds are normal. There is no distension. Palpations: Abdomen is soft. There is no mass. Tenderness: There is no abdominal tenderness. Musculoskeletal:      Right lower leg: No edema. Left lower leg: No edema. Psychiatric:         Mood and Affect: Mood normal.       Monofilament intact bilaterally. Pulses 2+ bilaterally. No skin lesions or open wounds. Visit Vitals  /72 (BP 1 Location: Right arm, BP Patient Position: Sitting, BP Cuff Size: Large adult)   Pulse 70   Temp 97.5 °F (36.4 °C) (Temporal)   Resp 16   Ht 5' 5\" (1.651 m)   Wt 206 lb 9.6 oz (93.7 kg)   SpO2 98%   BMI 34.38 kg/m²        Aspects of this note may have been generated using voice recognition software. Despite editing, there may be some syntax errors   I have discussed the diagnosis with the patient and the intended plan as seen in the above orders. The patient has received an after-visit summary and questions were answered concerning future plans. I have discussed any recommended medication side effects and warnings with the patient as well.   She has expressed understanding of the diagnosis and plan    Silvana Alvarado MD

## 2021-06-24 ENCOUNTER — VIRTUAL VISIT (OUTPATIENT)
Dept: INTERNAL MEDICINE CLINIC | Age: 66
End: 2021-06-24

## 2021-06-24 DIAGNOSIS — R80.9 TYPE 2 DIABETES MELLITUS WITH MICROALBUMINURIA, WITH LONG-TERM CURRENT USE OF INSULIN (HCC): Primary | ICD-10-CM

## 2021-06-24 DIAGNOSIS — Z79.4 TYPE 2 DIABETES MELLITUS WITH MICROALBUMINURIA, WITH LONG-TERM CURRENT USE OF INSULIN (HCC): Primary | ICD-10-CM

## 2021-06-24 DIAGNOSIS — E11.29 TYPE 2 DIABETES MELLITUS WITH MICROALBUMINURIA, WITH LONG-TERM CURRENT USE OF INSULIN (HCC): Primary | ICD-10-CM

## 2021-06-24 LAB — HBA1C MFR BLD HPLC: 8.1 %

## 2021-06-24 NOTE — PROGRESS NOTES
Pharmacy Progress Note - Diabetes Management    S/O: Ms. Imelda Mullins is a 77 y.o. female, referred by Dr. Sandeep Ko MD, with a PMH of T2DM, HTN, HLD, obesity, depression, was seen today for diabetes management. Patient's last A1c was 8.1% (June 2021) which is improved from A1c 10.0% (May 2021) which is improved from 11.4% (Feb 2021). Pt was contacted via telephone today for visit. Pt reports seeing Endo (Dr. Wagner Mata) on 6/15/21. Labs were drawn and will be sent to PCP office. A1c was down to 8.1%. Endo decreased long acting insulin dose and increased rapid acting insulin dose at dinner. Doses have been changed in EHR. Pt is very proud that she has decreased her A1c significantly with diet changes. She has experienced fewer hypoglycemic events. Notes that she experiences hypoglycemic events if she doesn't eat on a regular schedule. Notes that she has been experiencing more nausea with Metformin 4 tabs at one time and has switched to BID dosing. Nausea has improved. Pt reports BMP drawn during Endo visit. K was elevated. She was told to stop taking Aleve and other NSAIDs and drink plenty of water. She will be going to get a repeat BMP next week. Results have not been sent to PCP office yet to confirm. SHx:  - [place]    Medication Adherence/Access:  - Started Samiaren Signs (4/23/21). Sample provided by endocrinologist.  $10 copay with insurance.     Current anti-hyperglycemic regimen includes:    - Metformin  mg 2 tabs BID  - Tresiba U-200 70 units every day  - Humalog U-100 inject 5 units per 15 gm of carbs up to 100 units per day, with dinner add 5 units to carb ratio  - Glimepiride 4 mg every day   - Jardiance 10 mg every day     ROS:  Today, Pt endorses:  - Symptoms of Hyperglycemia: none  - Symptoms of Hypoglycemia: sweating and clumsy or jerky movements had a low yesterday - sandwich around noon, errands - BG was 57 mg/dL - treated with food  - experiencing lows less often    Self Monitoring Blood Glucose (SMBG) or CGM:      Nutrition:  - starting to focus on calories in diet - 1500 quyen/day - has not been tracking at this time  - not interested in an rob to track carbs and calories at this time  - googles menu if she goes out to look up carbs in meal  - requests 1500 calorie meal plans    Physical Activity:   no  - still having issues with back - limits activity level especially now that NSAIDs were stopped - still up and moving in garden    Diabetes Health Maintenance:  · ASCVD Risk Factors: Age, Blood Pressure and Diabetes  · ASA therapy:  ASA 81 mg   · ACE/ARB therapy: Valsartan 80 mg   · Optimized statin therapy: Simvastatin 40 mg  · The 10-year ASCVD risk score (Ria Jones, et al., 2013) is: 11.3%    · LDL: 95 mg/dL (4/23/21)   · Nicotine dependence: No  · Last eye exam: 12/9/20  · Last foot exam: 12/26/19  · Last influenza vaccine: 9/4/20  · Last Pneumovax 23 vaccine: 11/15/85, 10/20/20  · Last Prevnar-13 vaccine: 9/30/15  · Hepatitis B Series: unknown  · COVID-19 vaccine: 3/6/21, 3/27/21      Vitals:   Wt Readings from Last 3 Encounters:   05/20/21 206 lb 9.6 oz (93.7 kg)   02/23/21 203 lb 12.8 oz (92.4 kg)   11/12/20 200 lb 3.2 oz (90.8 kg)     BP Readings from Last 3 Encounters:   05/20/21 114/72   02/23/21 136/62   11/12/20 105/64     Pulse Readings from Last 3 Encounters:   05/20/21 70   02/23/21 65   11/12/20 70       Past Medical History:   Diagnosis Date    Adverse effect of anesthesia     woke during surgical procedure with MAC and cath    Allergic rhinitis     Asthma     mild    Calculus 2.28/11    EXCESS URIC ACID; HAS STONES CURRENTLY    Chronic pain     Color vision deficiency     Depression     Diabetes ([facility] [address]     Hypercholesterolemia     Hypertension     Ill-defined condition     pneumonia /bronchitis hx    Ill-defined condition 2020    pancreatitis    Incisional hernia 10/17/2012    Pancreatitis 1969    CYST ON PANCREAS BURST BEFORE SURG FOR REMOVAL    Trigger thumb of left hand     and right hand     Allergies   Allergen Reactions    Adhesive Tape Contact Dermatitis     Blistering UNDER STERI-STRIPS    Contrast Agent [Iodine] Hives    Pcn [Penicillins] Hives    Levaquin [Levofloxacin] Nausea and Vomiting    Niacin Hives and Itching    Shellfish Derived Hives and Swelling     CRAB       Current Outpatient Medications   Medication Sig    L. liam-L. gavin-L. carlitos-LShelia rham (AZO Complete Feminine Balance) 5 billion cell cap Take 1 Tablet by mouth daily.  empagliflozin (Jardiance) 10 mg tablet Take 10 mg by mouth daily.  allopurinoL (ZYLOPRIM) 300 mg tablet TAKE 1 TABLET BY MOUTH EVERY DAY    flash glucose sensor (FreeStyle Colby 2 Sensor) kit Apply sensor to arm to check blood sugar and change every 14 days as directed    Tiadylt  mg capsule TAKE 1 CAPSULE BY MOUTH EVERY DAY    omeprazole (PRILOSEC) 20 mg capsule TAKE 1 CAPSULE BY MOUTH EVERY DAY    valsartan (DIOVAN) 80 mg tablet TAKE 1 TABLET BY MOUTH EVERY DAY    simvastatin (ZOCOR) 40 mg tablet TAKE 1 TABLET BY MOUTH EVERYDAY AT BEDTIME    fenofibrate (LOFIBRA) 54 mg tablet TAKE 1 TABLET BY MOUTH EVERY DAY    escitalopram oxalate (LEXAPRO) 20 mg tablet Take 1 Tab by mouth daily.  clobetasoL (TEMOVATE) 0.05 % topical cream two (2) times daily as needed.  acetaminophen (Tylenol Extra Strength) 500 mg tablet Take  by mouth every six (6) hours as needed for Pain.  nystatin (Nystop) powder APPLY TO AFFECTED AREA TWO (2) TIMES A DAY. AS NEEDED    spironolactone (ALDACTONE) 25 mg tablet Take 25 mg by mouth daily.  glimepiride (AMARYL) 4 mg tablet TAKE 1 TABLET WITH BREAKFAST OR THE FIRST MAIN MEAL OF THE DAY ONCE A DAY    metFORMIN ER (GLUCOPHAGE XR) 500 mg tablet Take 2,000 mg by mouth daily (with dinner).     HUMALOG KWIKPEN INSULIN 100 unit/mL kwikpen INJECT 5 UNITS PER 15 GRAMS OF CARBS UP  UNITS PER DAY SUBCUTANEOUSLY    NOHEMY PEN NEEDLE 32 gauge x 5/32\" ndle USE AS DIRECTED FOR INSULIN INJECTIONS 4 TIMES A DAY    naproxen sodium (ALEVE) 220 mg tablet Take 220 mg by mouth as needed.  TRESIBA FLEXTOUCH U-200 200 unit/mL (3 mL) inpn 80 Units daily.  ibuprofen (ADVIL) 200 mg tablet Take  by mouth every six (6) hours as needed.  terbinafine HCl (LAMISIL) 1 % topical cream Apply  to affected area two (2) times a day. (Patient taking differently: Apply  to affected area two (2) times daily as needed.)    glucose blood VI test strips ([email or web address] CONTOUR) strip by Does Not Apply route See Admin Instructions. Qwickly contour test strips, tests 2-4 times per day. Ordered by Dr. Elena Ward.  Insulin Syringe-Needle U-100 (BD INSULIN SYRINGE ULT-FINE II) 1 mL 31 x 5/16\" syrg USE 4 TIMES A DAY AS DIRECTED    aspirin 81 mg tablet Take 81 mg by mouth daily. No current facility-administered medications for this visit. Lab Results   Component Value Date/Time    Sodium 137 02/23/2021 10:52 AM    Potassium 4.8 03/03/2021 01:33 PM    Chloride 101 02/23/2021 10:52 AM    CO2 22 02/23/2021 10:52 AM    Anion gap 13 04/16/2019 01:32 PM    Glucose 166 (H) 02/23/2021 10:52 AM    BUN 27 02/23/2021 10:52 AM    Creatinine 1.11 (H) 02/23/2021 10:52 AM    BUN/Creatinine ratio 24 02/23/2021 10:52 AM    GFR est AA 60 02/23/2021 10:52 AM    GFR est non-AA 52 (L) 02/23/2021 10:52 AM    Calcium 9.8 02/23/2021 10:52 AM    Bilirubin, total 0.3 02/23/2021 10:52 AM    Alk.  phosphatase 71 02/23/2021 10:52 AM    Protein, total 6.4 02/23/2021 10:52 AM    Albumin 4.0 02/23/2021 10:52 AM    Globulin 4.0 04/16/2019 01:32 PM    A-G Ratio 1.7 02/23/2021 10:52 AM    ALT (SGPT) 12 02/23/2021 10:52 AM     Lab Results   Component Value Date/Time    Cholesterol, total 162 02/23/2021 10:52 AM    HDL Cholesterol 55 02/23/2021 10:52 AM    LDL, calculated 79 02/23/2021 10:52 AM    LDL, calculated 82 10/11/2018 10:28 AM    VLDL, calculated 28 02/23/2021 10:52 AM    VLDL, calculated 15 10/11/2018 10:28 AM    Triglyceride 166 (H) 02/23/2021 10:52 AM     Lab Results   Component Value Date/Time    WBC 8.0 10/20/2020 12:04 PM    HGB 12.7 10/20/2020 12:04 PM    HCT 38.7 10/20/2020 12:04 PM    PLATELET 558 99/05/7889 12:04 PM    MCV 83 10/20/2020 12:04 PM       Lab Results   Component Value Date/Time    Microalb/Creat ratio (ug/mg creat.) 40 (H) 10/20/2020 12:04 PM       Lab Results   Component Value Date/Time    Hemoglobin A1c 11.4 (H) 02/23/2021 10:52 AM    Hemoglobin A1c 11.6 (H) 10/20/2020 12:04 PM    Hemoglobin A1c 10.7 (H) 10/11/2018 10:28 AM    Hemoglobin A1c, External 10.0 04/24/2021 12:00 AM    Hemoglobin A1c, External 10.0 04/23/2021 12:00 AM    Hemoglobin A1c, External 10.4 05/12/2020 12:00 AM     Hemoglobin A1c (POC)   Date Value Ref Range Status   10/10/2014 11.6 % Final        CrCl cannot be calculated (Unknown ideal weight. ). A/P:    1) T2DM: Per ADA guidelines, Pt's A1c is not at goal of < 7%; however, CGM rdgs continue to improve. Insulin regimen recently adjusted by Endo. Pt self adjusted how she is taking Metformin in order to avoid nausea. Pt interested in tracking calories more in order to aid in weight loss. Discussed 1500 calorie diet and provided meal plans that fit that calorie goal.  - Continue Tresiba 70 units every day  - Continue Humalog 5 units per 15 gm of carb, add 5 units to carb ratio at dinner  - Continue Metformin  mg 2 tabs BID  - Continue Glimepiride 4 mg every day  - Encouraged low carb snacks - recs provided  - Encouraged 1500 quyen diet    2) HTN: BP is at goal of < 130/80. Currently taking CCB, ARB, and K-sparing diuretic. ARB is first line in tx of HTN in DM. Pt has evidence of microalbuminuria. No change in therapy required at this time. - Tiadylt  mg every day  - Valsartan 80 mg every day  - Spironolactone 25 mg every day    3) Primary Prevention ASCVD: Per ADA guidelines, pts age 43-69 yrs are recommended for statin therapy.   Currently taking moderate intensity statin and fenofibrate. LDL <100 mg/dL. Low dose ASA appropriate for heart health. - Continue Simvastatin 40 mg every day  - Fenofibrate 54 mg every day   - Continue ASA 81 mg every day       Resources provided:  - 1500 quyen meal plans emailed to pt      Medication reconciliation was completed during the visit. Medications Discontinued During This Encounter   Medication Reason    ibuprofen (ADVIL) 200 mg tablet LIST CLEANUP    naproxen sodium (ALEVE) 220 mg tablet LIST CLEANUP     No orders of the defined types were placed in this encounter. Patient verbalized understanding of the information presented and all of the patients questions were answered. AVS was handed to the patient. Patient advised to call the office with any additional questions or concerns. Notifications of recommendations will be sent to Dr. Rivas Franz MD for review. Patient will return to clinic in 12 week(s) for follow up. Gail VaughnD, [facility]  Clinical Pharmacist Specialist          For Pharmacy Admin Tracking Only     CPA in place:  Yes   Recommendation Provided To: Patient/Caregiver: 3 via Virtual Visit   Intervention Detail: Discontinued Rx: 2, reason: MARGIE and Scheduled Appointment   Gap Closed?: No   Total # of Interventions Recommended: 3   Total # of Interventions Accepted: 3   Intervention Accepted By: Patient/Caregiver: 3   Time Spent (min): 30

## 2021-07-09 LAB
HBA1C MFR BLD HPLC: 8.1 %
MICROALBUMIN/CREAT RATIO, EXTERNAL: <30

## 2021-07-14 ENCOUNTER — TELEPHONE (OUTPATIENT)
Dept: INTERNAL MEDICINE CLINIC | Age: 66
End: 2021-07-14

## 2021-07-14 NOTE — TELEPHONE ENCOUNTER
[facility] [place] [facility] [phone]     RQ labs within last 6 months    FAX: [phone]  ATTN: Marc Jarvis Melchor requesting

## 2021-07-15 ENCOUNTER — TRANSCRIBE ORDER (OUTPATIENT)
Dept: SCHEDULING | Age: 66
End: 2021-07-15

## 2021-07-15 DIAGNOSIS — S42.201A CLOSED FRACTURE OF RIGHT PROXIMAL HUMERUS: Primary | ICD-10-CM

## 2021-07-27 RX ORDER — FLASH GLUCOSE SENSOR
KIT MISCELLANEOUS
Qty: 2 KIT | Refills: 3 | Status: SHIPPED | OUTPATIENT
Start: 2021-07-27 | End: 2021-11-17

## 2021-09-14 LAB
HBA1C MFR BLD HPLC: 7.9 %
LDL-C, EXTERNAL: 65

## 2021-09-22 ENCOUNTER — VIRTUAL VISIT (OUTPATIENT)
Dept: INTERNAL MEDICINE CLINIC | Age: 66
End: 2021-09-22

## 2021-09-22 DIAGNOSIS — E11.29 TYPE 2 DIABETES MELLITUS WITH MICROALBUMINURIA, WITH LONG-TERM CURRENT USE OF INSULIN (HCC): Primary | ICD-10-CM

## 2021-09-22 DIAGNOSIS — R80.9 TYPE 2 DIABETES MELLITUS WITH MICROALBUMINURIA, WITH LONG-TERM CURRENT USE OF INSULIN (HCC): Primary | ICD-10-CM

## 2021-09-22 DIAGNOSIS — Z79.4 TYPE 2 DIABETES MELLITUS WITH MICROALBUMINURIA, WITH LONG-TERM CURRENT USE OF INSULIN (HCC): Primary | ICD-10-CM

## 2021-09-22 NOTE — PROGRESS NOTES
Pharmacy Progress Note - Diabetes Management    S/O: Ms. Gennaro Gonzales is a 77 y.o. female, referred by Dr. Rhonda Navarro MD, with a PMH of T2DM, HTN, HLD, obesity, depression, was seen today for diabetes management.  Patient's last A1c was 8.1% (June 2021) which is improved from A1c 10.0% (May 2021) which is improved from 11.4% (Feb 2021).       Interim update: Pt was last seen by this writer on 6/24/21. Visit was conducted via telephone. Pt's T2DM is managed by Endo (Dr. Virginia Leonardo). During PharmD visit, pt wanted to focus on weight loss strategies. Calorie and carb goals were discussed. Pt's visit was conducted via telephone today. Pt states she has had a \"rough summer\". She became overheated and passed out in her kitchen which lead to a broken bone in her shoulder blade (7/11/21). Completed shoulder surgery (7/27/21). She was unable to cook or dress herself. She is now in PT 2x per week and completes at home exercises. Still experiences pain, but she notes she is getting stronger. She has been unable to keep to 1500 calorie diet d/t other people cooking for her for several weeks. She is now able to independently cook and dress. She reports seeing Dr. Virginia Leonardo last week. New A1c drawn - 7.9%. Pt is proud of her improvement considering her lack of food control and lack of exercise. She reports having had recurrent yeast infections while on Jardiance. She stopped this med several weeks ago. Dr. Virginia Leonardo agrees with stopping agent. No other med was started to replace SGLT2 inhibitor. Pt has been on GLP-1 agonist in the past, but this was stopped d/t hx of pancreatitis and concern for experiencing it again. Pt also states  is thinking of retiring next year which may impact coverage of meds and Freestyle Colby. Pt purchases Colby for $40/mo. She is pleased with this price.   Reports CGM is the biggest factor in her BG control and how she adheres to insulin's carb ratio dosing. 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    Current anti-hyperglycemic regimen includes:    - Metformin  mg 2 tabs BID  - Tresiba U-200 70 units every day  - Humalog U-100 inject 5 units per 15 gm of carbs up to 100 units per day, with dinner add 5 units to carb ratio  - Glimepiride 4 mg every day     ROS:  Today, Pt endorses:  - Symptoms of Hyperglycemia: none  - Symptoms of Hypoglycemia: none    Self Monitoring Blood Glucose (SMBG) or CGM:      Diabetes Health Maintenance:  · ASCVD Risk Factors: Age, Blood Pressure and Diabetes  · ASA therapy:  ASA 81 mg  · ACE/ARB therapy: Valsartan 80 mg  · Optimized statin therapy: Simvastatin 40 mg  · The 10-year ASCVD risk score (Kelvin Baugh, et al., 2013) is: 11.3%    · LDL: 79 mg/dL (2/23/21)  · Nicotine dependence: No  · Last eye exam: 12/9/20  · Last foot exam: 12/26/19  · Last influenza vaccine: 9/4/20  · Last Pneumovax 23 vaccine: 11/15/85, 10/20/20  · Last Prevnar-13 vaccine: 9/30/15  · Hepatitis B Series: unknown   · COVID-19 vaccine: 3/6/21, 3/27/21      Vitals:   Wt Readings from Last 3 Encounters:   05/20/21 206 lb 9.6 oz (93.7 kg)   02/23/21 203 lb 12.8 oz (92.4 kg)   11/12/20 200 lb 3.2 oz (90.8 kg)     BP Readings from Last 3 Encounters:   05/20/21 114/72   02/23/21 136/62   11/12/20 105/64     Pulse Readings from Last 3 Encounters:   05/20/21 70   02/23/21 65   11/12/20 70       Past Medical History:   Diagnosis Date    Adverse effect of anesthesia     woke during surgical procedure with MAC and cath    Allergic rhinitis     Asthma     mild    Calculus 2.28/11    EXCESS URIC ACID; HAS STONES CURRENTLY    Chronic pain     Color vision deficiency     Depression     Diabetes ([facility] [address]     Hypercholesterolemia     Hypertension     Ill-defined condition     pneumonia /bronchitis hx    Ill-defined condition 2020    pancreatitis    Incisional hernia 10/17/2012    Pancreatitis 1969    CYST ON PANCREAS BURST BEFORE SURG FOR REMOVAL    Trigger thumb of left hand     and right hand     Allergies   Allergen Reactions    Adhesive Tape Contact Dermatitis     Blistering UNDER STERI-STRIPS    Contrast Agent [Iodine] Hives    Pcn [Penicillins] Hives    Levaquin [Levofloxacin] Nausea and Vomiting    Niacin Hives and Itching    Shellfish Derived Hives and Swelling     CRAB       Current Outpatient Medications   Medication Sig    fenofibrate (LOFIBRA) 54 mg tablet TAKE 1 TABLET BY MOUTH EVERY DAY    valsartan (DIOVAN) 80 mg tablet TAKE 1 TABLET BY MOUTH EVERY DAY    escitalopram oxalate (LEXAPRO) 20 mg tablet TAKE 1 TABLET BY MOUTH EVERY DAY    FreeStyle Colby 2 Sensor kit APPLY SENSOR TO ARM TO CHECK BLOOD SUGAR AND CHANGE EVERY 14 DAYS AS DIRECTED    L. liam-L. gavin-L. carlitos-L. rham (AZO Complete Feminine Balance) 5 billion cell cap Take 1 Tablet by mouth daily.  empagliflozin (Jardiance) 10 mg tablet Take 10 mg by mouth daily.  allopurinoL (ZYLOPRIM) 300 mg tablet TAKE 1 TABLET BY MOUTH EVERY DAY    Tiadylt  mg capsule TAKE 1 CAPSULE BY MOUTH EVERY DAY    omeprazole (PRILOSEC) 20 mg capsule TAKE 1 CAPSULE BY MOUTH EVERY DAY    simvastatin (ZOCOR) 40 mg tablet TAKE 1 TABLET BY MOUTH EVERYDAY AT BEDTIME    clobetasoL (TEMOVATE) 0.05 % topical cream two (2) times daily as needed.  acetaminophen (Tylenol Extra Strength) 500 mg tablet Take  by mouth every six (6) hours as needed for Pain. (Patient not taking: Reported on 6/24/2021)    nystatin (Nystop) powder APPLY TO AFFECTED AREA TWO (2) TIMES A DAY. AS NEEDED    spironolactone (ALDACTONE) 25 mg tablet Take 25 mg by mouth daily.  glimepiride (AMARYL) 4 mg tablet TAKE 1 TABLET WITH BREAKFAST OR THE FIRST MAIN MEAL OF THE DAY ONCE A DAY    metFORMIN ER (GLUCOPHAGE XR) 500 mg tablet Take 1,000 mg by mouth two (2) times a day.  HUMALOG KWIKPEN INSULIN 100 unit/mL kwikpen INJECT 5 UNITS PER 15 GRAMS OF CARBS UP  UNITS PER DAY SUBCUTANEOUSLY.   Add 5 units to carb ratio at dinner    NOHEMY PEN NEEDLE 32 gauge x 5/32\" ndle USE AS DIRECTED FOR INSULIN INJECTIONS 4 TIMES A DAY    TRESIBA FLEXTOUCH U-200 200 unit/mL (3 mL) inpn 70 Units daily.  terbinafine HCl (LAMISIL) 1 % topical cream Apply  to affected area two (2) times a day. (Patient taking differently: Apply  to affected area two (2) times daily as needed.)    glucose blood VI test strips ([employer] CONTOUR) strip by Does Not Apply route See Admin Instructions. Boo contour test strips, tests 2-4 times per day. Ordered by Dr. Roman Howard.  Insulin Syringe-Needle U-100 (BD INSULIN SYRINGE ULT-FINE II) 1 mL 31 x 5/16\" syrg USE 4 TIMES A DAY AS DIRECTED    aspirin 81 mg tablet Take 81 mg by mouth daily. No current facility-administered medications for this visit. Lab Results   Component Value Date/Time    Sodium 137 02/23/2021 10:52 AM    Potassium 4.8 03/03/2021 01:33 PM    Chloride 101 02/23/2021 10:52 AM    CO2 22 02/23/2021 10:52 AM    Anion gap 13 04/16/2019 01:32 PM    Glucose 166 (H) 02/23/2021 10:52 AM    BUN 27 02/23/2021 10:52 AM    Creatinine 1.11 (H) 02/23/2021 10:52 AM    BUN/Creatinine ratio 24 02/23/2021 10:52 AM    GFR est AA 60 02/23/2021 10:52 AM    GFR est non-AA 52 (L) 02/23/2021 10:52 AM    Calcium 9.8 02/23/2021 10:52 AM    Bilirubin, total 0.3 02/23/2021 10:52 AM    Alk.  phosphatase 71 02/23/2021 10:52 AM    Protein, total 6.4 02/23/2021 10:52 AM    Albumin 4.0 02/23/2021 10:52 AM    Globulin 4.0 04/16/2019 01:32 PM    A-G Ratio 1.7 02/23/2021 10:52 AM    ALT (SGPT) 12 02/23/2021 10:52 AM     Lab Results   Component Value Date/Time    Cholesterol, total 162 02/23/2021 10:52 AM    HDL Cholesterol 55 02/23/2021 10:52 AM    LDL, calculated 79 02/23/2021 10:52 AM    LDL, calculated 82 10/11/2018 10:28 AM    VLDL, calculated 28 02/23/2021 10:52 AM    VLDL, calculated 15 10/11/2018 10:28 AM    Triglyceride 166 (H) 02/23/2021 10:52 AM     Lab Results   Component Value Date/Time    WBC 8.0 10/20/2020 12:04 PM    HGB 12.7 10/20/2020 12:04 PM    HCT 38.7 10/20/2020 12:04 PM    PLATELET 869 40/23/9524 12:04 PM    MCV 83 10/20/2020 12:04 PM       Lab Results   Component Value Date/Time    Microalb/Creat ratio (ug/mg creat.) 40 (H) 10/20/2020 12:04 PM       Lab Results   Component Value Date/Time    Hemoglobin A1c 11.4 (H) 02/23/2021 10:52 AM    Hemoglobin A1c 11.6 (H) 10/20/2020 12:04 PM    Hemoglobin A1c 10.7 (H) 10/11/2018 10:28 AM    Hemoglobin A1c, External 8.1 06/16/2021 12:00 AM    Hemoglobin A1c, External 8.1 06/16/2021 12:00 AM    Hemoglobin A1c, External 8.1 06/15/2021 12:00 AM     Hemoglobin A1c (POC)   Date Value Ref Range Status   10/10/2014 11.6 % Final        CrCl cannot be calculated (Patient's most recent lab result is older than the maximum 180 days allowed. ). A/P:    1) T2DM: Per ADA guidelines, Pt's A1c is not at goal of < 7%. Current SMBG(s)/CGM trend is improving despite physical and nutrition set backs and discontinuation of SGLT2 inhibitor. 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 Discussed why Lissa Effect occurs. Pt tries to BG decrease naturally. Pt is not a candidate for GLP-1 agonist therapy d/t pancreatitis hx. Will continue current regimen and f/up on diet at future visit. - Continue Metformin  mg 2 tabs BID  - Continue Tresiba U-200 70 units every day  - Continue Humalog U-100 inject 5 units per 15 gm of carbs up to 100 units per day, with dinner add 5 units to carb ratio  - Continue Glimepiride 4 mg every day     2) HTN: BP is at goal of < 130/80.   Currently taking CCB, ARB, and K-sparing diuretic.  ARB is first line in tx of HTN in DM.  Pt has evidence of microalbuminuria.    - Continue Tiadylt  mg every day  - Continue Valsartan 80 mg every day  - Continue Spironolactone 25 mg every day    3) Primary Prevention ASCVD: Per ADA guidelines, pts age 43-69 yrs are recommended for statin therapy.  Currently taking moderate intensity statin and fenofibrate.  LDL <100 mg/dL.  Low dose ASA appropriate for heart health. - Continue Simvastatin 40 mg every day  - Continue Fenofibrate 54 mg every day   - Continue ASA 81 mg every day         Medication reconciliation was completed during the visit. Medications Discontinued During This Encounter   Medication Reason    empagliflozin (Jardiance) 10 mg tablet LIST CLEANUP     No orders of the defined types were placed in this encounter. Patient verbalized understanding of the information presented and all of the patients questions were answered. AVS was handed to the patient. Patient advised to call the office with any additional questions or concerns. Notifications of recommendations will be sent to Dr. Shar Jara MD for review. Patient will return to clinic in 4 week(s) for follow up. Lora Ann, PharmD, [facility]  Clinical Pharmacist Specialist          For Pharmacy Admin Tracking Only     CPA in place:  Yes   Recommendation Provided To: Patient/Caregiver: 2 via Virtual Visit   Intervention Detail: Discontinued Rx: 1, reason: MARGIE and Scheduled Appointment   Gap Closed?: No   Intervention Accepted By: Patient/Caregiver: 2   Time Spent (min): 30

## 2021-09-23 ENCOUNTER — TRANSCRIBE ORDER (OUTPATIENT)
Dept: SCHEDULING | Age: 66
End: 2021-09-23

## 2021-09-23 DIAGNOSIS — R91.8 PULMONARY NODULES: Primary | ICD-10-CM

## 2021-10-01 ENCOUNTER — HOSPITAL ENCOUNTER (OUTPATIENT)
Dept: CT IMAGING | Age: 66
Discharge: HOME | End: 2021-10-01
Payer: COMMERCIAL

## 2021-10-01 DIAGNOSIS — R91.8 PULMONARY NODULES: ICD-10-CM

## 2021-10-01 PROCEDURE — 71250 CT THORAX DX C-: CPT | Performed by: INTERNAL MEDICINE

## 2021-10-05 ENCOUNTER — OFFICE VISIT (OUTPATIENT)
Dept: INTERNAL MEDICINE CLINIC | Age: 66
End: 2021-10-05
Payer: COMMERCIAL

## 2021-10-05 VITALS
HEIGHT: 65 IN | RESPIRATION RATE: 18 BRPM | WEIGHT: 200.8 LBS | OXYGEN SATURATION: 98 % | TEMPERATURE: 98.2 F | BODY MASS INDEX: 33.45 KG/M2 | SYSTOLIC BLOOD PRESSURE: 121 MMHG | HEART RATE: 77 BPM | DIASTOLIC BLOOD PRESSURE: 75 MMHG

## 2021-10-05 DIAGNOSIS — R80.9 TYPE 2 DIABETES MELLITUS WITH MICROALBUMINURIA, WITH LONG-TERM CURRENT USE OF INSULIN (HCC): ICD-10-CM

## 2021-10-05 DIAGNOSIS — B37.2 YEAST INFECTION OF THE SKIN: ICD-10-CM

## 2021-10-05 DIAGNOSIS — H60.543 ECZEMA OF BOTH EXTERNAL EARS: ICD-10-CM

## 2021-10-05 DIAGNOSIS — G47.00 INSOMNIA, UNSPECIFIED TYPE: ICD-10-CM

## 2021-10-05 DIAGNOSIS — E78.5 HYPERLIPIDEMIA WITH TARGET LDL LESS THAN 100: ICD-10-CM

## 2021-10-05 DIAGNOSIS — F32.5 MAJOR DEPRESSIVE DISORDER WITH SINGLE EPISODE, IN FULL REMISSION (HCC): ICD-10-CM

## 2021-10-05 DIAGNOSIS — M67.40 GANGLION CYST: ICD-10-CM

## 2021-10-05 DIAGNOSIS — I10 ESSENTIAL HYPERTENSION: Primary | ICD-10-CM

## 2021-10-05 DIAGNOSIS — K21.9 GASTROESOPHAGEAL REFLUX DISEASE WITHOUT ESOPHAGITIS: ICD-10-CM

## 2021-10-05 DIAGNOSIS — E11.29 TYPE 2 DIABETES MELLITUS WITH MICROALBUMINURIA, WITH LONG-TERM CURRENT USE OF INSULIN (HCC): ICD-10-CM

## 2021-10-05 DIAGNOSIS — Z79.4 TYPE 2 DIABETES MELLITUS WITH MICROALBUMINURIA, WITH LONG-TERM CURRENT USE OF INSULIN (HCC): ICD-10-CM

## 2021-10-05 DIAGNOSIS — M85.89 OSTEOPENIA OF MULTIPLE SITES: ICD-10-CM

## 2021-10-05 PROCEDURE — G8417 CALC BMI ABV UP PARAM F/U: HCPCS | Performed by: INTERNAL MEDICINE

## 2021-10-05 PROCEDURE — G9899 SCRN MAM PERF RSLTS DOC: HCPCS | Performed by: INTERNAL MEDICINE

## 2021-10-05 PROCEDURE — 2022F DILAT RTA XM EVC RTNOPTHY: CPT | Performed by: INTERNAL MEDICINE

## 2021-10-05 PROCEDURE — 1101F PT FALLS ASSESS-DOCD LE1/YR: CPT | Performed by: INTERNAL MEDICINE

## 2021-10-05 PROCEDURE — G9717 DOC PT DX DEP/BP F/U NT REQ: HCPCS | Performed by: INTERNAL MEDICINE

## 2021-10-05 PROCEDURE — 1090F PRES/ABSN URINE INCON ASSESS: CPT | Performed by: INTERNAL MEDICINE

## 2021-10-05 PROCEDURE — 3017F COLORECTAL CA SCREEN DOC REV: CPT | Performed by: INTERNAL MEDICINE

## 2021-10-05 PROCEDURE — G8536 NO DOC ELDER MAL SCRN: HCPCS | Performed by: INTERNAL MEDICINE

## 2021-10-05 PROCEDURE — G8754 DIAS BP LESS 90: HCPCS | Performed by: INTERNAL MEDICINE

## 2021-10-05 PROCEDURE — G8399 PT W/DXA RESULTS DOCUMENT: HCPCS | Performed by: INTERNAL MEDICINE

## 2021-10-05 PROCEDURE — 99214 OFFICE O/P EST MOD 30 MIN: CPT | Performed by: INTERNAL MEDICINE

## 2021-10-05 PROCEDURE — G8752 SYS BP LESS 140: HCPCS | Performed by: INTERNAL MEDICINE

## 2021-10-05 PROCEDURE — G8427 DOCREV CUR MEDS BY ELIG CLIN: HCPCS | Performed by: INTERNAL MEDICINE

## 2021-10-05 RX ORDER — ALENDRONATE SODIUM 70 MG/1
70 TABLET ORAL
Qty: 12 TABLET | Refills: 1 | Status: SHIPPED | OUTPATIENT
Start: 2021-10-05 | End: 2022-10-14

## 2021-10-05 RX ORDER — NYSTATIN 100000 U/G
CREAM TOPICAL 2 TIMES DAILY
Qty: 30 G | Refills: 1 | Status: SHIPPED | OUTPATIENT
Start: 2021-10-05

## 2021-10-05 NOTE — PROGRESS NOTES
Michelle Smart is a 77 y.o. female who was seen today for a follow-up visit. Assessment & Plan:       ICD-10-CM ICD-9-CM    1. Essential hypertension   Well controlled, continue current medication. I10 401.9    2. Hyperlipidemia with target LDL less than 100   Well controlled, continue current medication. Reviewed last lab work. E78.5 272.4    3. Major depressive disorder with single episode, in full remission ([facility] [address]   At goal continue current medication F32.5 296.26    4. Type 2 diabetes mellitus with microalbuminuria, with long-term current use of insulin ([facility])   A1c much improved and continue to work on diabetic diet. Continue current medication as per endocrine. E11.29 250.40     R80.9 791.0     Z79.4 V58.67    5. Osteopenia of multiple sites counseled regarding calcium, vitamin D, exercise. Given recent humerus fracture reviewed benefit of bisphosphonate therapy. She would like to be more aggressive in managing her bone health and would like to start Fosamax. Reviewed potential side effects of medication. Particularly if she has any increase in GERD symptoms she will contact me. M85.89 733.90    6. Gastroesophageal reflux disease without esophagitis   Well-controlled continue current medication K21.9 530.81    7. Yeast infection of the skin   Nystatin cream alternating with powder as needed B37.2 112.3    8. Ganglion cyst on thumb left  Counseled if increasing  size or becomes tender to follow-up M67.40 727.43    9. Insomnia, unspecified type   She will do a trial of melatonin G47.00 780.52    10. Eczema of both external ears   Counseled her to use small amount of Vaseline on fingertip to moisten external ear canals daily. H60.543 380.22      Follow-up and Dispositions    · Return in about 6 months (around 4/5/2022) for follow up.       lab results and schedule of future lab studies reviewed with patient.        Subjective:   Michelle Smart was seen for  Hypertension,  DM with microalbuminuria and hyperlipidemia, depression. She now has the Vaddio sensor and doing much better. Last a1c 8.1 which is significantly improved. No low BS. She is also followed by endocrine Dr Rosita Baird. Following better diabetic diet. Blood pressures ranging: not checking recently. Taking medication with no side effects. Right humerus fx 7/11/21 and repaired with surgery and is still doing PT and ROM has improved. She has intermittent yeast infections in her groin that she uses nystatin powder for and would like the nystatin cream to use as she has been alternating that. It was previously given to her by her gynecologist.    Review of Systems   HENT:        Notes some itching in ears. Respiratory: Positive for shortness of breath (with exertion and is being evaluated by Dr Hopkins Record in pulmonary). Cardiovascular: Negative for chest pain and leg swelling. Gastrointestinal: Negative for abdominal pain and heartburn. Musculoskeletal:        She reports a bump on her left thumb which is nontender and not increasing in size. Psychiatric/Behavioral: Negative for depression and suicidal ideas. The patient has insomnia. - per HPI    Physical Exam  Vitals and nursing note reviewed. Constitutional:       General: She is not in acute distress. Appearance: She is well-developed. HENT:      Head: Normocephalic and atraumatic. Right Ear: Tympanic membrane normal.      Left Ear: Tympanic membrane normal.      Ears:      Comments: External ear canals are clear except for some mild dry skin. Cardiovascular:      Rate and Rhythm: Normal rate and regular rhythm. Pulmonary:      Effort: Pulmonary effort is normal.      Breath sounds: Normal breath sounds. Abdominal:      General: Bowel sounds are normal.      Palpations: Abdomen is soft. Tenderness: There is no abdominal tenderness.    Musculoskeletal:      Comments: Left thumb cyst.    Psychiatric:         Mood and Affect: Mood normal. Monofilament intact bilaterally. Pulses 2+ bilaterally. No skin lesions or open wounds. Visit Vitals  /75 (BP 1 Location: Left upper arm, BP Patient Position: Sitting, BP Cuff Size: Large adult)   Pulse 77   Temp 98.2 °F (36.8 °C) (Temporal)   Resp 18   Ht 5' 5\" (1.651 m)   Wt 200 lb 12.8 oz (91.1 kg)   SpO2 98%   BMI 33.41 kg/m²      Aspects of this note may have been generated using voice recognition software. Despite editing, there may be some syntax errors   I have discussed the diagnosis with the patient and the intended plan as seen in the above orders. The patient has received an after-visit summary and questions were answered concerning future plans. I have discussed any recommended medication side effects and warnings with the patient as well.   She has expressed understanding of the diagnosis and plan    Wilmar Gatica MD

## 2021-10-05 NOTE — PROGRESS NOTES
Chief Complaint   Patient presents with    Blood Pressure Check     6 mo follow up     Diabetes    Medication Evaluation     patient would like to discuss rx from another provider - Nystatin cream          1. Have you been to the ER, urgent care clinic since your last visit? Hospitalized since your last visit? No    2. Have you seen or consulted any other health care providers outside of the [address] [place] [address] since your last visit? Include any pap smears or colon screening.  No

## 2021-10-07 RX ORDER — FENOFIBRATE 54 MG/1
TABLET ORAL
Qty: 90 TABLET | Refills: 0 | Status: SHIPPED | OUTPATIENT
Start: 2021-10-07 | End: 2022-02-15

## 2021-10-11 RX ORDER — SIMVASTATIN 40 MG/1
TABLET, FILM COATED ORAL
Qty: 30 TABLET | Refills: 5 | Status: SHIPPED | OUTPATIENT
Start: 2021-10-11 | End: 2022-10-10 | Stop reason: SDUPTHER

## 2021-10-15 ENCOUNTER — TELEPHONE (OUTPATIENT)
Dept: INTERNAL MEDICINE CLINIC | Age: 66
End: 2021-10-15

## 2021-10-15 NOTE — TELEPHONE ENCOUNTER
Pharmacy Progress Note - Telephone Call    Ms. Kathrine Dias 77 y.o. was contacted via an outbound telephone call regarding rescheduling appt in March today. A voicemail was left for patient to return my call. This writer's work mobile number was provided as a callback contact - [phone].       Cande Reyngaa, PharmD, [facility] [place]  Clinical Pharmacist Specialist        For Pharmacy Admin Tracking Only     Time Spent (min): 5

## 2021-10-19 ENCOUNTER — TELEPHONE (OUTPATIENT)
Dept: INTERNAL MEDICINE CLINIC | Age: 66
End: 2021-10-19

## 2021-10-19 NOTE — TELEPHONE ENCOUNTER
Pharmacy Progress Note - Telephone Encounter    S/O: Ms. Barby Hassan 77 y.o. female, referred by Dr. Betty Jeronimo MD, was contacted via an outbound telephone call to discuss rescheduling DM f/up visit today. Verified patients identifiers (name & ) per HIPAA policy.     - This writer requested a change in visit. Pt amenable to rescheduling. A/P:  - Rescheduled for 3/30/21  - Patient endorses understanding to the provided information. All questions answered at this time. There are no discontinued medications. No orders of the defined types were placed in this encounter. Matt Edward, PharmD, BCGP  Clinical Pharmacist Specialist      For Pharmacy Admin Tracking Only     CPA in place:  Yes   Recommendation Provided To: Patient/Caregiver: 1 via Telephone   Intervention Detail: Scheduled Appointment   Gap Closed?: No   Intervention Accepted By: Patient/Caregiver: 1   Time Spent (min): 5

## 2021-11-17 RX ORDER — FLASH GLUCOSE SENSOR
KIT MISCELLANEOUS
Qty: 2 KIT | Refills: 3 | Status: SHIPPED | OUTPATIENT
Start: 2021-11-17 | End: 2022-03-08

## 2022-02-22 ENCOUNTER — OFFICE VISIT (OUTPATIENT)
Dept: INTERNAL MEDICINE CLINIC | Age: 67
End: 2022-02-22
Payer: COMMERCIAL

## 2022-02-22 VITALS
TEMPERATURE: 97.8 F | SYSTOLIC BLOOD PRESSURE: 129 MMHG | DIASTOLIC BLOOD PRESSURE: 69 MMHG | HEIGHT: 65 IN | WEIGHT: 213.2 LBS | RESPIRATION RATE: 16 BRPM | OXYGEN SATURATION: 99 % | HEART RATE: 67 BPM | BODY MASS INDEX: 35.52 KG/M2

## 2022-02-22 DIAGNOSIS — E78.5 HYPERLIPIDEMIA WITH TARGET LDL LESS THAN 100: ICD-10-CM

## 2022-02-22 DIAGNOSIS — Z01.818 PREOP EXAMINATION: Primary | ICD-10-CM

## 2022-02-22 DIAGNOSIS — E11.29 TYPE 2 DIABETES MELLITUS WITH MICROALBUMINURIA, WITH LONG-TERM CURRENT USE OF INSULIN (HCC): ICD-10-CM

## 2022-02-22 DIAGNOSIS — E66.01 SEVERE OBESITY (BMI 35.0-39.9) WITH COMORBIDITY (HCC): ICD-10-CM

## 2022-02-22 DIAGNOSIS — F33.42 DEPRESSION, MAJOR, RECURRENT, IN COMPLETE REMISSION (HCC): ICD-10-CM

## 2022-02-22 DIAGNOSIS — R80.9 TYPE 2 DIABETES MELLITUS WITH MICROALBUMINURIA, WITH LONG-TERM CURRENT USE OF INSULIN (HCC): ICD-10-CM

## 2022-02-22 DIAGNOSIS — I10 HYPERTENSION, UNSPECIFIED TYPE: ICD-10-CM

## 2022-02-22 DIAGNOSIS — Z79.4 TYPE 2 DIABETES MELLITUS WITH MICROALBUMINURIA, WITH LONG-TERM CURRENT USE OF INSULIN (HCC): ICD-10-CM

## 2022-02-22 PROCEDURE — 2022F DILAT RTA XM EVC RTNOPTHY: CPT | Performed by: INTERNAL MEDICINE

## 2022-02-22 PROCEDURE — 99214 OFFICE O/P EST MOD 30 MIN: CPT | Performed by: INTERNAL MEDICINE

## 2022-02-22 PROCEDURE — G8752 SYS BP LESS 140: HCPCS | Performed by: INTERNAL MEDICINE

## 2022-02-22 PROCEDURE — G9899 SCRN MAM PERF RSLTS DOC: HCPCS | Performed by: INTERNAL MEDICINE

## 2022-02-22 PROCEDURE — G8399 PT W/DXA RESULTS DOCUMENT: HCPCS | Performed by: INTERNAL MEDICINE

## 2022-02-22 PROCEDURE — 3046F HEMOGLOBIN A1C LEVEL >9.0%: CPT | Performed by: INTERNAL MEDICINE

## 2022-02-22 PROCEDURE — G8427 DOCREV CUR MEDS BY ELIG CLIN: HCPCS | Performed by: INTERNAL MEDICINE

## 2022-02-22 PROCEDURE — G8536 NO DOC ELDER MAL SCRN: HCPCS | Performed by: INTERNAL MEDICINE

## 2022-02-22 PROCEDURE — 3017F COLORECTAL CA SCREEN DOC REV: CPT | Performed by: INTERNAL MEDICINE

## 2022-02-22 PROCEDURE — G8754 DIAS BP LESS 90: HCPCS | Performed by: INTERNAL MEDICINE

## 2022-02-22 PROCEDURE — 1090F PRES/ABSN URINE INCON ASSESS: CPT | Performed by: INTERNAL MEDICINE

## 2022-02-22 PROCEDURE — 1101F PT FALLS ASSESS-DOCD LE1/YR: CPT | Performed by: INTERNAL MEDICINE

## 2022-02-22 PROCEDURE — G8417 CALC BMI ABV UP PARAM F/U: HCPCS | Performed by: INTERNAL MEDICINE

## 2022-02-22 PROCEDURE — G9717 DOC PT DX DEP/BP F/U NT REQ: HCPCS | Performed by: INTERNAL MEDICINE

## 2022-02-22 RX ORDER — FLUTICASONE PROPIONATE AND SALMETEROL 100; 50 UG/1; UG/1
1 POWDER RESPIRATORY (INHALATION) DAILY
COMMUNITY
Start: 2021-10-31

## 2022-02-22 NOTE — PROGRESS NOTES
Beryle Heinz presents for preop evaluation:   Surgeon : Ivonne TOMAS/Dr. Emeterio Palmer  Type of surgery : Right capsular release/shoulder  [facility]  Anesthesia type: General/Nerve block per patient  Date of procedure: 03/15/22    Patient has a history of diabetes with most recent HgB A1C 9.3% (previous 7.9%)  She has a follow up appointment with Endocrine next month. She reports that she has been trying to improve her diet, and medications were recently adjusted,Glimipride was increased to bid and Tresiba increased to 80 units. [employer]'s running 100's-240     She has Sleep apnea has CPAP, which she uses intermittently    Allergies: Allergies   Allergen Reactions    Adhesive Tape Contact Dermatitis     Blistering UNDER STERI-STRIPS    Contrast Agent [Iodine] Hives    Pcn [Penicillins] Hives    Levaquin [Levofloxacin] Nausea and Vomiting    Niacin Hives and Itching    Shellfish Derived Hives and Swelling     CRAB     Latex allergy: no    Current Outpatient Medications on File Prior to Visit   Medication Sig Dispense Refill    fluticasone propion-salmeteroL (Advair Diskus) 100-50 mcg/dose diskus inhaler Take 1 Puff by inhalation daily.  albuterol sulfate 90 mcg/actuation aebs Take  by inhalation as needed for Wheezing.  fenofibrate (LOFIBRA) 54 mg tablet TAKE 1 TABLET BY MOUTH EVERY DAY 30 Tablet 5    FreeStyle Colby 2 Sensor kit APPLY SENSOR TO ARM TO CHECK BLOOD SUGAR AND CHANGE EVERY 14 DAYS AS DIRECTED 2 Kit 3    simvastatin (ZOCOR) 40 mg tablet TAKE 1 TABLET BY MOUTH EVERYDAY AT BEDTIME 30 Tablet 5    nystatin (MYCOSTATIN) topical cream Apply  to affected area two (2) times a day. As needed.  30 g 1    valsartan (DIOVAN) 80 mg tablet TAKE 1 TABLET BY MOUTH EVERY DAY 30 Tablet 5    escitalopram oxalate (LEXAPRO) 20 mg tablet TAKE 1 TABLET BY MOUTH EVERY DAY (Patient taking differently: Take 20 mg by mouth daily.) 90 Tablet 1    L. liam-L. gavin-L. carlitos-L. rham (AZO Complete Feminine Balance) 5 billion cell cap Take 1 Tablet by mouth daily.  allopurinoL (ZYLOPRIM) 300 mg tablet TAKE 1 TABLET BY MOUTH EVERY DAY 30 Tab 6    Tiadylt  mg capsule TAKE 1 CAPSULE BY MOUTH EVERY DAY      omeprazole (PRILOSEC) 20 mg capsule TAKE 1 CAPSULE BY MOUTH EVERY DAY 90 Cap 3    clobetasoL (TEMOVATE) 0.05 % topical cream two (2) times daily as needed.  acetaminophen (Tylenol Extra Strength) 500 mg tablet Take  by mouth every six (6) hours as needed for Pain.  nystatin (Nystop) powder APPLY TO AFFECTED AREA TWO (2) TIMES A DAY. AS NEEDED 60 g 1    spironolactone (ALDACTONE) 25 mg tablet Take 25 mg by mouth daily.  glimepiride (AMARYL) 4 mg tablet Take 4 mg by mouth two (2) times a day. 6    metFORMIN ER (GLUCOPHAGE XR) 500 mg tablet Take 1,000 mg by mouth two (2) times a day. 6    HUMALOG KWIKPEN INSULIN 100 unit/mL kwikpen INJECT 5 UNITS PER 15 GRAMS OF CARBS UP  UNITS PER DAY SUBCUTANEOUSLY. Add 5 units to carb ratio at dinner  2    NOHEMY PEN NEEDLE 32 gauge x 5/32\" ndle USE AS DIRECTED FOR INSULIN INJECTIONS 4 TIMES A DAY  3    TRESIBA FLEXTOUCH U-200 200 unit/mL (3 mL) inpn 80 Units daily. 6    Insulin Syringe-Needle U-100 (BD INSULIN SYRINGE ULT-FINE II) 1 mL 31 x 5/16\" syrg USE 4 TIMES A DAY AS DIRECTED 100 Syringe 5    aspirin 81 mg tablet Take 81 mg by mouth daily.  alendronate (FOSAMAX) 70 mg tablet Take 1 Tablet by mouth every seven (7) days. In am with 8 ounces of water. Remain upright and nothing by mouth for 30 minutes after . (Patient not taking: Reported on 2/22/2022) 12 Tablet 1     No current facility-administered medications on file prior to visit.        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 A    Recurrent ventral incisional hernia K43.2    Rectus diastasis M62.08    Abdominal wall pain in epigastric region R10.13    Epigastric hernia K43.9    Microalbuminuria R80.9    Severe obesity (BMI 35.0-39. 9) E66.01     Past Surgical History:   Procedure Laterality Date    COLONOSCOPY N/A 10/1/2020    COL[facility]    HX CATARACT REMOVAL Bilateral 2015    Yancy Kellogg    HX COLONOSCOPY      HX GI      pancreatitis; CYST    HX GI  05/29/15    hernia repair, #2    HX GI  2014    HERNIA REPAIR #1    HX GI  [id number]    incisional hernia repair with component separation    HX GYN      5 dilation and curratages    HX HEENT  1988    T&A    HX HEENT  1993    SEPTOPLASTY    HX HEENT      LASER SURGERY BOTH EYES    HX HEENT  7-2-15    left catarac surgery    HX HEENT  7-16-15    right catarac surgery    HX HERNIA REPAIR  1-14-16    recurrent ventral incisional hernia repair-[facility]Dr. Peter Young    HX HERNIA REPAIR  2015    HX HERNIA REPAIR  08/01/2016    hernia mesh repair    HX HYSTERECTOMY  1982    HX OOPHORECTOMY Bilateral 3/11    benign ovarian cyst    HX ORTHOPAEDIC  1982    TUMOR EXCISED L MIDDLE FINGER    HX OTHER SURGICAL  1996    MOLES EXCISED-FACE & BACK (WOKE DURING)    HX OTHER SURGICAL  08/04/2016    exploratory lap, DIONNE    HX OVARIAN CYST REMOVAL  3/2011    HX SKIN BIOPSY      moles on back (size of dime) and some on face removed    [facility]LISTED  2014    hernia repair    [facility]BDOM[facility]ERY [address]    pancreatic cyst (ruptured) pancreatitis    [facility] CARDIAC SUR[facility] PROCEDURE UNLIST  2002    CARDIAC CATH FOR PALPITATIONS     Social History     Tobacco Use    Smoking status: Never Smoker    Smokeless tobacco: Never Used   Vaping Use    Vaping Use: Never used   Substance Use Topics    Alcohol use: No    Drug use: No       Review of Systems   Constitutional: Negative for chills and fever. Weight gain 13 lbs   HENT: Negative for congestion, hearing loss, sinus pain and sore throat. Respiratory: Negative for cough. Occasional wheezing relieved with Advair inhaler    Sleep apnea, uses CPAP intermittently   Cardiovascular: Negative for chest pain, palpitations and leg swelling. Gastrointestinal: Negative for abdominal pain, constipation, diarrhea, heartburn, nausea and vomiting. Genitourinary: Negative for dysuria, frequency and urgency. Musculoskeletal: Positive for back pain. Back pain secondary to spinal stenosis  Right shoulder pain   Neurological: Negative for dizziness, sensory change, focal weakness and headaches. Psychiatric/Behavioral: Positive for depression. Depression, worse around holidays  Denies suicidal thoughts     Physical Exam:  Visit Vitals  /69   Pulse 67   Temp 97.8 °F (36.6 °C) (Temporal)   Resp 16   Ht 5' 5\" (1.651 m)   Wt 213 lb 3.2 oz (96.7 kg)   SpO2 99%   BMI 35.48 kg/m²     HEENT: normocephalic, extraocular movements intact, conjunctiva clear  Oropharynx: clear. No erythema, exudate, or drainage  Nose: no drainage  Sinuses: nontender  Ears: tympanic membrane's and canals normal.  No erythema, fluid, drainage  Neck: supple, without lymphadenopathy  Heart[de-identified] normal rate, regular rhythm, normal S1, S2, no murmurs, rubs, clicks or gallops. Chest: clear to auscultation, no wheezes, rales or rhonchi,   Abdomen: soft, obese, non tender, without palpable mass or hepatosplenomegaly   Extremities: no edema, surgical scar right shoulder, reduced range of motion   Psych: normal mood and affect    Assessment/Plan:  There are no medical contraindications to surgery pending review of pre op labs and EKG    Type II Diabetes: Patient would ideally have improved blood sugar control prior to surgery.  She is going to try to adhere to her diet over the next couple of weeks. She will continue her current diabetes medications and follow up with Endocrine as planned next month. HTN: stable, continue current medications  Hyperlipidemia: stable, controlled when last checked in January. Continue statin.   Depression: Patient will continue her current medications and follow up with Pennei Leal MD   Sleep Apnea: encouraged to wear her CPAP  Asthma: stable  Obesity

## 2022-03-03 LAB
CREATININE, EXTERNAL: 1.03
HBA1C MFR BLD HPLC: 9.8 %

## 2022-03-04 RX ORDER — ALLOPURINOL 300 MG/1
TABLET ORAL
Qty: 30 TABLET | Refills: 6 | Status: SHIPPED | OUTPATIENT
Start: 2022-03-04

## 2022-03-08 RX ORDER — FLASH GLUCOSE SENSOR
KIT MISCELLANEOUS
Qty: 2 KIT | Refills: 3 | Status: SHIPPED | OUTPATIENT
Start: 2022-03-08 | End: 2022-07-08

## 2022-03-22 ENCOUNTER — NURSE TRIAGE (OUTPATIENT)
Dept: OTHER | Facility: CLINIC | Age: 67
End: 2022-03-22

## 2022-03-22 NOTE — TELEPHONE ENCOUNTER
Received call from Connie  at [facility] with Red Flag Complaint. Subjective: Caller states \"My BP is running high. I had surgery on my shoulder March 15. I feel lightheaded and spacey\"     Current Symptoms:   +\"I am not steady on my feet. I feel lightheaded. \"   +I am urinating a whole lot - my sugar is ok. \"   Debria Ras frequency - denies pain with urination   +urine is normal in color - \"some foam\"   -denies back or flank pain - denies pain with urination   +cuff at home \"running in the 170s/70-80s   +BP is usually under 160   +no missed doses of BP medication   +weakness -denies headache   +not steady on my feet   +ok driving per pt - driving during triage   -denies blurred vision or vision changes   +only took pain medication twice   -denies chest pain or shortness of breath       Onset: 5 days ago; unchanged    Associated Symptoms: reduced fluid intake    Pain Severity:Denies     Temperature:Denies     What has been tried: no medications or interventions     LMP: NA Pregnant: NA    Recommended disposition: Go to ED Now-pt driving at time of triage - advised pt to pull over and get an ambulance - pt states he sugar \"had been running a little low today - in the 80s after eating\" -pt in agreement and states she is pulling into a doctor office. Care advice provided, patient verbalizes understanding; denies any other questions or concerns; instructed to call back for any new or worsening symptoms. Patient/caller agrees to proceed to local  Emergency Department    Attention Provider: Thank you for allowing me to participate in the care of your patient. The patient was connected to triage in response to information provided to the [facility]. Please do not respond through this encounter as the response is not directed to a shared pool.       Reason for Disposition   [9] Systolic BP  >= 133 OR Diastolic >= 364 AND [7] cardiac or neurologic symptoms (e.g., chest pain, difficulty breathing, unsteady gait, blurred vision)    Protocols used: BLOOD PRESSURE - HIGH-ADULT-AH

## 2022-03-30 ENCOUNTER — TELEPHONE (OUTPATIENT)
Dept: INTERNAL MEDICINE CLINIC | Age: 67
End: 2022-03-30

## 2022-03-30 NOTE — TELEPHONE ENCOUNTER
Pharmacy Progress Note - Telephone Call    Ms. Jason Gibbs 77 y.o. was contacted via an outbound telephone call regarding rescheduling DM f/up visit today. A voicemail was left for patient to return my call. This writer's work mobile number was provided as a callback contact - [phone]. Clovis Saint, GailD, BCGP  Clinical Pharmacist Specialist        For Pharmacy Admin Tracking Only     CPA in place:  Yes   Recommendation Provided To: Patient/Caregiver: 0 via Telephone   Intervention Accepted By: Patient/Caregiver: 0   Time Spent (min): 5

## 2022-04-14 ENCOUNTER — OFFICE VISIT (OUTPATIENT)
Dept: INTERNAL MEDICINE CLINIC | Age: 67
End: 2022-04-14
Payer: COMMERCIAL

## 2022-04-14 VITALS
WEIGHT: 210 LBS | TEMPERATURE: 97.5 F | HEART RATE: 85 BPM | DIASTOLIC BLOOD PRESSURE: 64 MMHG | SYSTOLIC BLOOD PRESSURE: 128 MMHG | HEIGHT: 64 IN | OXYGEN SATURATION: 98 % | RESPIRATION RATE: 16 BRPM | BODY MASS INDEX: 35.85 KG/M2

## 2022-04-14 DIAGNOSIS — N18.31 CHRONIC RENAL IMPAIRMENT, STAGE 3A (HCC): ICD-10-CM

## 2022-04-14 DIAGNOSIS — Z00.00 MEDICARE ANNUAL WELLNESS VISIT, SUBSEQUENT: Primary | ICD-10-CM

## 2022-04-14 DIAGNOSIS — N30.90 BLADDER INFECTION: ICD-10-CM

## 2022-04-14 DIAGNOSIS — E11.29 TYPE 2 DIABETES MELLITUS WITH MICROALBUMINURIA, WITH LONG-TERM CURRENT USE OF INSULIN (HCC): ICD-10-CM

## 2022-04-14 DIAGNOSIS — Z79.4 TYPE 2 DIABETES MELLITUS WITH MICROALBUMINURIA, WITH LONG-TERM CURRENT USE OF INSULIN (HCC): ICD-10-CM

## 2022-04-14 DIAGNOSIS — E66.01 SEVERE OBESITY WITH BODY MASS INDEX (BMI) OF 35.0 TO 39.9 WITH SERIOUS COMORBIDITY (HCC): ICD-10-CM

## 2022-04-14 DIAGNOSIS — R80.9 TYPE 2 DIABETES MELLITUS WITH MICROALBUMINURIA, WITH LONG-TERM CURRENT USE OF INSULIN (HCC): ICD-10-CM

## 2022-04-14 DIAGNOSIS — F33.1 MAJOR DEPRESSIVE DISORDER, RECURRENT, MODERATE (HCC): ICD-10-CM

## 2022-04-14 DIAGNOSIS — N20.0 NEPHROLITHIASIS, URIC ACID: ICD-10-CM

## 2022-04-14 DIAGNOSIS — R91.1 PULMONARY NODULE: ICD-10-CM

## 2022-04-14 DIAGNOSIS — I10 HYPERTENSION, UNSPECIFIED TYPE: ICD-10-CM

## 2022-04-14 DIAGNOSIS — J45.20 MILD INTERMITTENT ASTHMA WITHOUT COMPLICATION: ICD-10-CM

## 2022-04-14 PROBLEM — F33.0 MAJOR DEPRESSIVE DISORDER, RECURRENT, MILD (HCC): Status: RESOLVED | Noted: 2022-04-14 | Resolved: 2022-04-14

## 2022-04-14 PROBLEM — F33.9 MAJOR DEPRESSIVE DISORDER, RECURRENT, UNSPECIFIED (HCC): Status: ACTIVE | Noted: 2022-04-14

## 2022-04-14 PROBLEM — F33.9 MAJOR DEPRESSIVE DISORDER, RECURRENT, UNSPECIFIED (HCC): Status: RESOLVED | Noted: 2022-04-14 | Resolved: 2022-04-14

## 2022-04-14 PROBLEM — F33.0 MAJOR DEPRESSIVE DISORDER, RECURRENT, MILD (HCC): Status: ACTIVE | Noted: 2022-04-14

## 2022-04-14 LAB
BILIRUB UR QL STRIP: NEGATIVE
GLUCOSE UR-MCNC: NEGATIVE MG/DL
KETONES P FAST UR STRIP-MCNC: NEGATIVE MG/DL
PH UR STRIP: 5.5 [PH] (ref 4.6–8)
PROT UR QL STRIP: NORMAL
SP GR UR STRIP: 1.03 (ref 1–1.03)
UA UROBILINOGEN AMB POC: NORMAL (ref 0.2–1)
URINALYSIS CLARITY POC: NORMAL
URINALYSIS COLOR POC: YELLOW
URINE BLOOD POC: NORMAL
URINE LEUKOCYTES POC: NORMAL
URINE NITRITES POC: NEGATIVE

## 2022-04-14 PROCEDURE — 3046F HEMOGLOBIN A1C LEVEL >9.0%: CPT | Performed by: INTERNAL MEDICINE

## 2022-04-14 PROCEDURE — G8417 CALC BMI ABV UP PARAM F/U: HCPCS | Performed by: INTERNAL MEDICINE

## 2022-04-14 PROCEDURE — G8427 DOCREV CUR MEDS BY ELIG CLIN: HCPCS | Performed by: INTERNAL MEDICINE

## 2022-04-14 PROCEDURE — 1101F PT FALLS ASSESS-DOCD LE1/YR: CPT | Performed by: INTERNAL MEDICINE

## 2022-04-14 PROCEDURE — 3017F COLORECTAL CA SCREEN DOC REV: CPT | Performed by: INTERNAL MEDICINE

## 2022-04-14 PROCEDURE — G8536 NO DOC ELDER MAL SCRN: HCPCS | Performed by: INTERNAL MEDICINE

## 2022-04-14 PROCEDURE — G9717 DOC PT DX DEP/BP F/U NT REQ: HCPCS | Performed by: INTERNAL MEDICINE

## 2022-04-14 PROCEDURE — G8754 DIAS BP LESS 90: HCPCS | Performed by: INTERNAL MEDICINE

## 2022-04-14 PROCEDURE — G9899 SCRN MAM PERF RSLTS DOC: HCPCS | Performed by: INTERNAL MEDICINE

## 2022-04-14 PROCEDURE — 1090F PRES/ABSN URINE INCON ASSESS: CPT | Performed by: INTERNAL MEDICINE

## 2022-04-14 PROCEDURE — G8752 SYS BP LESS 140: HCPCS | Performed by: INTERNAL MEDICINE

## 2022-04-14 PROCEDURE — 81003 URINALYSIS AUTO W/O SCOPE: CPT | Performed by: INTERNAL MEDICINE

## 2022-04-14 PROCEDURE — 99214 OFFICE O/P EST MOD 30 MIN: CPT | Performed by: INTERNAL MEDICINE

## 2022-04-14 PROCEDURE — G0439 PPPS, SUBSEQ VISIT: HCPCS | Performed by: INTERNAL MEDICINE

## 2022-04-14 PROCEDURE — G8399 PT W/DXA RESULTS DOCUMENT: HCPCS | Performed by: INTERNAL MEDICINE

## 2022-04-14 PROCEDURE — 2022F DILAT RTA XM EVC RTNOPTHY: CPT | Performed by: INTERNAL MEDICINE

## 2022-04-14 RX ORDER — BUPROPION HYDROCHLORIDE 150 MG/1
150 TABLET ORAL
Qty: 30 TABLET | Refills: 0 | Status: SHIPPED | OUTPATIENT
Start: 2022-04-14 | End: 2022-05-12 | Stop reason: SDUPTHER

## 2022-04-14 NOTE — PROGRESS NOTES
This is the Subsequent Medicare Annual Wellness Exam, performed 12 months or more after the Initial AWV or the last Subsequent AWV    I have reviewed the patient's medical history in detail and updated the computerized patient record. Assessment/Plan   Education and counseling provided:  Are appropriate based on today's review and evaluation    1. Medicare annual wellness visit, subsequent       Depression Risk Factor Screening     3 most recent PHQ Screens 4/11/2022   Little interest or pleasure in doing things More than half the days   Feeling down, depressed, irritable, or hopeless More than half the days   Total Score PHQ 2 4   Trouble falling or staying asleep, or sleeping too much More than half the days   Feeling tired or having little energy Nearly every day   Poor appetite, weight loss, or overeating Several days   Feeling bad about yourself - or that you are a failure or have let yourself or your family down Several days   Trouble concentrating on things such as school, work, reading, or watching TV Several days   Moving or speaking so slowly that other people could have noticed; or the opposite being so fidgety that others notice Not at all   Thoughts of being better off dead, or hurting yourself in some way Not at all   PHQ 9 Score 12   How difficult have these problems made it for you to do your work, take care of your home and get along with others Somewhat difficult       Alcohol & Drug Abuse Risk Screen   Do you average more than 1 drink per night or more than 7 drinks a week?: (P) No  On any one occasion in the past three months have you had more than 3 drinks containing alcohol?: (P) No            Functional Ability and Level of Safety   Hearing:  Hearing: (P) Patient reports hearing is good      Activities of Daily Living:   The home contains: (P) no safety equipment,rugs  Functional ADLs: (P) Patient does total self care     Ambulation:  Patient ambulates: (P) with no difficulty     Fall Risk:  Fall Risk Assessment, last 12 mths 4/14/2022   Able to walk? Yes   Fall in past 12 months? 1   Do you feel unsteady? 0   Are you worried about falling 0   Is TUG test greater than 12 seconds? 0   Is the gait abnormal? 0   Number of falls in past 12 months 1   Fall with injury? 1     Abuse Screen:  Do you ever feel afraid of your partner?: (P) No  Are you in a relationship with someone who physically or mentally threatens you?: (P) No  Is it safe for you to go home?: (P) Yes        Cognitive Screening   Has your family/caregiver stated any concerns about your memory?: (P) No     Cognitive Screening: normal    Health Maintenance Due   There are no preventive care reminders to display for this patient. Patient Care Team   Patient Care Team:  Crys Miller MD as PCP - [facility] Cristiano Rodriguez MD as PCP - [facility]  Tahmina Silver MD (Endocrinology)  Sita Meyers MD as Physician (Ophthalmology)  Bard Cortes as Physician (Optometry)  Cecille Almendarez MD as Surgeon (General Surgery)  Michael Juan MD as Surgeon (General Surgery)  Darryle Marion, NP as Nurse Practitioner (General Surgery)  Maki Mata NP as Nurse Practitioner (General Surgery)  Augustine Leung DC (Chiropractic)  Jadon Foster MD (Pulmonary Disease)  Too Hernandez MD (Orthopedic Surgery)    History     Patient Active Problem List   Diagnosis Code    Allergic rhinitis, cause unspecified J30.9    Hyperlipidemia with target LDL less than 100 E78.5    Type 2 diabetes mellitus with microalbuminuria, with long-term current use of insulin ([facility]) E11.29, R80.9, Z79.4    Essential hypertension I10    Nephrolithiasis, uric acid N20.0    Depression F32. A    Severe obesity with body mass index (BMI) of 35.0 to 39.9 with serious comorbidity ([facility]) E66.01    Major depressive disorder, recurrent, moderate F33.1     Past Medical History:   Diagnosis Date    Adverse effect of anesthesia     woke during surgical procedure with MAC and cath    Allergic rhinitis     Anemia     Asthma     mild    Calculus 2.28/11    EXCESS URIC ACID; HAS STONES CURRENTLY    Chronic pain     Color vision deficiency     Depression     Diabetes ([address]     Hypercholesterolemia     Hypertension     Incisional hernia 10/17/2012    Pancreatitis 1969    CYST ON PANCREAS BURST BEFORE SURG FOR REMOVAL    Trigger thumb of left hand     and right hand      Past Surgical History:   Procedure Laterality Date    COLONOSCOPY N/A 10/1/2020    COLONOSCOPY performed [facility]COPY    HX CATARACT REMOVAL Bilateral 2015    Trwoo Hernadez    HX COLONOSCOPY      HX ENDOSCOPY  2/2020    HX GI      pancreatitis; CYST    HX GI  05/29/15    hernia repair, #2    HX GI  2014    HERNIA REPAIR #1    HX GI  [id number]    incisional hernia repair with component separation    HX GYN      5 dilation and curratages    HX HEENT  1988    T&A    HX HEENT  1993    SEPTOPLASTY    HX HEENT      LASER SURGERY BOTH EYES    HX HEENT  7-2-15    left catarac surgery    HX HEENT  7-16-15    right catarac surgery    HX HERNIA REPAIR  1-14-16    recurrent ventral incisional hernia repair-[facility]. Kermit Shrestha    HX HERNIA REPAIR  2015    HX HERNIA REPAIR  08/01/2016    hernia mesh repair    HX HYSTERECTOMY  1982    HX OOPHORECTOMY Bilateral 3/11    benign ovarian cyst    HX ORTHOPAEDIC  1982    TUMOR EXCISED L MIDDLE FINGER    HX OTHER [address] (WO[place] DURING)    HX OTHER SURGICAL  08/04/2016    exploratory lap, DIONNE    HX OTHER SURGICAL Right 03/2022    capsule release-shoulder    HX OVARIAN CYST REMOVAL  3/2011    HX SKIN BIOPSY      moles on back (size of dime) and some on face removed    [place] ABDOMEN SURGERY PROC UNLISTED  2014    hernia repair    [place] ABDOMEN SURGERY [address]    pancreatic cyst (ruptured) pancreatitis    [place] CARDIAC SURG PROCEDURE UNLIST  2002 CARDIAC CATH FOR PALPITATIONS     Current Outpatient Medications   Medication Sig Dispense Refill    FreeStyle Colby 2 Sensor kit APPLY SENSOR TO ARM TO CHECK BLOOD SUGAR AND CHANGE EVERY 14 DAYS AS DIRECTED 2 Kit 3    allopurinoL (ZYLOPRIM) 300 mg tablet TAKE 1 TABLET BY MOUTH EVERY DAY 30 Tablet 6    fluticasone propion-salmeteroL (Advair Diskus) 100-50 mcg/dose diskus inhaler Take 1 Puff by inhalation daily.  albuterol sulfate 90 mcg/actuation aebs Take  by inhalation as needed for Wheezing.  fenofibrate (LOFIBRA) 54 mg tablet TAKE 1 TABLET BY MOUTH EVERY DAY 30 Tablet 5    simvastatin (ZOCOR) 40 mg tablet TAKE 1 TABLET BY MOUTH EVERYDAY AT BEDTIME 30 Tablet 5    alendronate (FOSAMAX) 70 mg tablet Take 1 Tablet by mouth every seven (7) days. In am with 8 ounces of water. Remain upright and nothing by mouth for 30 minutes after . 12 Tablet 1    nystatin (MYCOSTATIN) topical cream Apply  to affected area two (2) times a day. As needed. 30 g 1    valsartan (DIOVAN) 80 mg tablet TAKE 1 TABLET BY MOUTH EVERY DAY 30 Tablet 5    escitalopram oxalate (LEXAPRO) 20 mg tablet TAKE 1 TABLET BY MOUTH EVERY DAY (Patient taking differently: Take 20 mg by mouth daily.) 90 Tablet 1    L. liam-L. gavin-LShelia urbina-LShelia rham (AZO Complete Feminine Balance) 5 billion cell cap Take 1 Tablet by mouth daily.  Tiadylt  mg capsule TAKE 1 CAPSULE BY MOUTH EVERY DAY      omeprazole (PRILOSEC) 20 mg capsule TAKE 1 CAPSULE BY MOUTH EVERY DAY 90 Cap 3    clobetasoL (TEMOVATE) 0.05 % topical cream two (2) times daily as needed.  acetaminophen (Tylenol Extra Strength) 500 mg tablet Take  by mouth every six (6) hours as needed for Pain.  nystatin (Nystop) powder APPLY TO AFFECTED AREA TWO (2) TIMES A DAY. AS NEEDED 60 g 1    spironolactone (ALDACTONE) 25 mg tablet Take 25 mg by mouth daily.  glimepiride (AMARYL) 4 mg tablet Take 4 mg by mouth two (2) times a day.   6    metFORMIN ER (GLUCOPHAGE XR) 500 mg tablet Take 1,000 mg by mouth two (2) times a day. 6    HUMALOG KWIKPEN INSULIN 100 unit/mL kwikpen INJECT 5 UNITS PER 15 GRAMS OF CARBS UP  UNITS PER DAY SUBCUTANEOUSLY. Add 5 units to carb ratio at dinner  2    NOHEMY PEN NEEDLE 32 gauge x 5/32\" ndle USE AS DIRECTED FOR INSULIN INJECTIONS 4 TIMES A DAY  3    TRESIBA FLEXTOUCH U-200 200 unit/mL (3 mL) inpn 80 Units daily. 6    Insulin Syringe-Needle U-100 (BD INSULIN SYRINGE ULT-FINE II) 1 mL 31 x 5/16\" syrg USE 4 TIMES A DAY AS DIRECTED 100 Syringe 5    aspirin 81 mg tablet Take 81 mg by mouth daily. Allergies   Allergen Reactions    Adhesive Tape Contact Dermatitis     Blistering UNDER STERI-STRIPS    Contrast Agent [Iodine] Hives    Pcn [Penicillins] Hives    Levaquin [Levofloxacin] Nausea and Vomiting    Niacin Hives and Itching    Shellfish Derived Hives and Swelling     CRAB       Family History   Problem Relation Age of Onset    Lung Disease Mother         copd    Cancer Father         LUNG, LIVER, skin    Other Brother         ACROMEGALY    Cancer Brother         melanoma    Anesth Problems Neg Hx      Social History     Tobacco Use    Smoking status: Never Smoker    Smokeless tobacco: Never Used   Substance Use Topics    Alcohol use: Never         Bev Minor MD     Rbaia Maguire is a 77 y.o. female who was also seen today for a follow-up visit. Assessment & Plan:   1. Medicare annual wellness visit, subsequent  Health maintenance reviewed and updated with patient today at visit. 2. Type 2 diabetes mellitus with microalbuminuria, with long-term current use of insulin ([address]  Not at goal and she is continue to work on medication adjustment with endocrine. Offered referral for diabetes education or to meet with our Pharm. D. to discuss her diabetic medications and diabetic diet further and she declined that today.   3. Chronic renal impairment, stage 3a (HCC)  Continue to avoid NSAIDs increase water intake and will check lab work through her endocrinologist and send to me. 4. Hypertension, unspecified type  At goal continue current medication  5. Major depressive disorder, recurrent, moderate (HCC)  Reviewed treatment options and will add Wellbutrin 150 mg every morning and she will continue with current Lexapro. Follow-up in 4 weeks. I gave her the name of psychiatry and counselors to think about going ahead and setting up an appointment in case she is not improving with the addition of the Wellbutrin. She is agreeable with this plan. 6. Mild intermittent asthma without complication  At goal continue current medication  7. Pulmonary nodule  Repeat chest CT September 2022  8. Nephrolithiasis, uric acid  Continue with allopurinol   9. Severe obesity with body mass index (BMI) of 35.0 to 39.9 with serious comorbidity ([address]  Reviewed weight loss strategies  10. Bladder infection  Has completed antibiotics no symptoms urine dip showed trace leuks and will send for formal urinalysis reflex to culture. -     AMB POC URINALYSIS DIP STICK AUTO W/O MICRO  -     URINALYSIS W/ REFLEX CULTURE; Future   follow up 4 weeks. .       Subjective:   Asher Schneider was seen for diabetes, hypertension, depression, asthma, uric acid nephrolithiasis. She was recently in the emergency room with a bladder infection and denies any urinary symptoms today. She completed a course of antibiotics. Diabetes: This is followed by Dr. Mychal Domínguez in [facility]. Her A1c's have not been controlled. They have been making adjustments in her medications. And she has an upcoming appointment. Has lab work planned with endocrine next week. She reports following a diabetic diet. BS ranging 100-300. Asthma: Also followed by pulmonary and has been well controlled on her current inhalers. Rare use of albuterol. She will be due for her next chest CT in September 2022. She also reports that her depression symptoms have not been controlled.   No suicidal thoughts. She is taking her medications as directed. No side effects. PHQ 9 Score: 12 (4/11/2022 10:00 AM)  Thoughts of being better off dead, or hurting yourself in some way: 0 (4/11/2022 10:00 AM)        Review of Systems   Constitutional: Negative for fever and weight loss. HENT: Negative for congestion and sore throat. Eyes: Negative. Respiratory: Positive for shortness of breath (Very infrequent now on current asthma medications). Negative for cough and wheezing. Cardiovascular: Negative for chest pain and leg swelling. Gastrointestinal: Negative for abdominal pain, constipation, diarrhea and heartburn. Genitourinary: Negative for dysuria. Musculoskeletal: Positive for back pain (spinal stenosis and DDD lumbar) and joint pain (right shoulder seeing ortho). Neurological: Negative for dizziness. Psychiatric/Behavioral: Positive for depression. Negative for suicidal ideas. - per HPI    Physical Exam  Vitals and nursing note reviewed. Constitutional:       General: She is not in acute distress. Appearance: She is well-developed. HENT:      Head: Normocephalic and atraumatic. Right Ear: Tympanic membrane and ear canal normal.      Left Ear: Tympanic membrane and ear canal normal.      Nose: Nose normal.   Eyes:      Conjunctiva/sclera: Conjunctivae normal.      Pupils: Pupils are equal, round, and reactive to light. Neck:      Thyroid: No thyromegaly. Cardiovascular:      Rate and Rhythm: Normal rate and regular rhythm. Heart sounds: Normal heart sounds. No murmur heard. Pulmonary:      Effort: Pulmonary effort is normal.      Breath sounds: Normal breath sounds. Chest:      Comments: Breast exam: breasts appear normal, no suspicious masses, no skin or nipple changes or axillary nodes. Abdominal:      General: Bowel sounds are normal.      Palpations: Abdomen is soft. There is no mass. Tenderness: There is no abdominal tenderness.    Musculoskeletal: Cervical back: Normal range of motion. Right lower leg: No edema. Left lower leg: No edema. Lymphadenopathy:      Cervical: No cervical adenopathy. Skin:     Findings: No rash. Neurological:      Cranial Nerves: No cranial nerve deficit. Sensory: No sensory deficit. Psychiatric:         Mood and Affect: Mood is depressed. Visit Vitals  /64   Pulse 85   Temp 97.5 °F (36.4 °C) (Temporal)   Resp 16   Ht 5' 4.06\" (1.627 m)   Wt 210 lb (95.3 kg)   SpO2 98%   BMI 35.98 kg/m²          Aspects of this note may have been generated using voice recognition software. Despite editing, there may be some syntax errors   I have discussed the diagnosis with the patient and the intended plan as seen in the above orders. The patient has received an after-visit summary and questions were answered concerning future plans. I have discussed any recommended medication side effects and warnings with the patient as well.   She has expressed understanding of the diagnosis and plan    Denton Schaefer MD

## 2022-04-14 NOTE — PATIENT INSTRUCTIONS
Medicare Wellness Visit, Female     The best way to live healthy is to have a lifestyle where you eat a well-balanced diet, exercise regularly, limit alcohol use, and quit all forms of tobacco/nicotine, if applicable. Regular preventive services are another way to keep healthy. Preventive services (vaccines, screening tests, monitoring & exams) can help personalize your care plan, which helps you manage your own care. Screening tests can find health problems at the earliest stages, when they are easiest to treat. [facility] follows the current, evidence-based guidelines published by the [facility] Angelo Sabillon ([facility]) when recommending preventive services for our patients. Because we follow these guidelines, sometimes recommendations change over time as research supports it. (For example, mammograms used to be recommended annually. Even though Medicare will still pay for an annual mammogram, the newer guidelines recommend a mammogram every two years for women of average risk). Of course, you and your doctor may decide to screen more often for some diseases, based on your risk and your co-morbidities (chronic disease you are already diagnosed with). Preventive services for you include:  - Medicare offers their members a free annual wellness visit, which is time for you and your primary care provider to discuss and plan for your preventive service needs. Take advantage of this benefit every year!  -All adults over the age of 72 should receive the recommended pneumonia vaccines. Current USPSTF guidelines recommend a series of two vaccines for the best pneumonia protection.   -All adults should have a flu vaccine yearly and a tetanus vaccine every 10 years.   -All adults age 48 and older should receive the shingles vaccines (series of two vaccines).       -All adults age 38-68 who are overweight should have a diabetes screening test once every three years.   -All adults born between [birth year] and 1965 should be screened once for Hepatitis C.  -Other screening tests and preventive services for persons with diabetes include: an eye exam to screen for diabetic retinopathy, a kidney function test, a foot exam, and stricter control over your cholesterol.   -Cardiovascular screening for adults with routine risk involves an electrocardiogram (ECG) at intervals determined by your doctor.   -Colorectal cancer screenings should be done for adults age 54-65 with no increased risk factors for colorectal cancer. There are a number of acceptable methods of screening for this type of cancer. Each test has its own benefits and drawbacks. Discuss with your doctor what is most appropriate for you during your annual wellness visit. The different tests include: colonoscopy (considered the best screening method), a fecal occult blood test, a fecal DNA test, and sigmoidoscopy.    -A bone mass density test is recommended when a woman turns 65 to screen for osteoporosis. This test is only recommended one time, as a screening. Some providers will use this same test as a disease monitoring tool if you already have osteoporosis. -Breast cancer screenings are recommended every other year for women of normal risk, age 54-69.  -Cervical cancer screenings for women over age 72 are only recommended with certain risk factors. Here is a list of your current Health Maintenance items (your personalized list of preventive services) with a due date: There are no preventive care reminders to display for this patient.

## 2022-04-16 LAB
APPEARANCE UR: CLEAR
BACTERIA #/AREA URNS HPF: ABNORMAL /[HPF]
BACTERIA UR CULT: NORMAL
BILIRUB UR QL STRIP: NEGATIVE
CASTS URNS QL MICRO: ABNORMAL /LPF
COLOR UR: YELLOW
EPI CELLS #/AREA URNS HPF: ABNORMAL /HPF (ref 0–10)
GLUCOSE UR QL STRIP: NEGATIVE
HGB UR QL STRIP: ABNORMAL
KETONES UR QL STRIP: NEGATIVE
LEUKOCYTE ESTERASE UR QL STRIP: ABNORMAL
MICRO URNS: ABNORMAL
NITRITE UR QL STRIP: NEGATIVE
PH UR STRIP: 5 [PH] (ref 5–7.5)
PROT UR QL STRIP: ABNORMAL
RBC #/AREA URNS HPF: ABNORMAL /HPF (ref 0–2)
SP GR UR STRIP: 1.02 (ref 1–1.03)
SPECIMEN STATUS REPORT, ROLRST: NORMAL
URINALYSIS REFLEX, 377202: ABNORMAL
UROBILINOGEN UR STRIP-MCNC: 0.2 MG/DL (ref 0.2–1)
WBC #/AREA URNS HPF: ABNORMAL /HPF (ref 0–5)

## 2022-04-18 DIAGNOSIS — R82.998 WHITE BLOOD CELLS PRESENT ON URINE MICROSCOPY: ICD-10-CM

## 2022-04-18 DIAGNOSIS — N30.90 BLADDER INFECTION: Primary | ICD-10-CM

## 2022-04-18 NOTE — LETTER
5/26/2022 11:18 AM    Ms. Rosales Headings  [address]  [facility] [address]    Ms. Melissa Henriquez,    Below you will find your most recent results. Orders Only on 04/18/2022   Component Date Value Ref Range Status    Specific Gravity 04/28/2022 1.024  1.005 - 1.030 Final    pH (UA) 04/28/2022 5.5  5.0 - 7.5 Final    Color 04/28/2022 Yellow  Yellow Final    Appearance 04/28/2022 Clear  Clear Final    Leukocyte Esterase 04/28/2022 2+* Negative Final    Protein 04/28/2022 2+* Negative/Trace Final    Glucose 04/28/2022 1+* Negative Final    Ketone 04/28/2022 Negative  Negative Final    Blood 04/28/2022 Trace* Negative Final    Bilirubin 04/28/2022 Negative  Negative Final    Urobilinogen 04/28/2022 0.2  0.2 - 1.0 mg/dL Final    Nitrites 04/28/2022 Negative  Negative Final    Microscopic Examination 04/28/2022 See additional order   Final    Microscopic was indicated and was performed.  URINALYSIS REFLEX 04/28/2022 Comment   Final    This specimen has reflexed to a Urine Culture.  WBC 04/28/2022 11-30* 0 - 5 /hpf Final    RBC 04/28/2022 0-2  0 - 2 /hpf Final    Epithelial cells 04/28/2022 0-10  0 - 10 /hpf Final    Casts 04/28/2022 None seen  None seen /lpf Final    Bacteria 04/28/2022 Few  None seen/Few Final    Urine Culture, Routine 04/28/2022    Final                    Value:Mixed urogenital andie  Less than 10,000 colonies/mL     Recommendations: We are continuing to see some white cells in your urine but no evidence of bacterial infection on urine culture.  I would not recommend we treat with antibiotics at this time.  Please let me know if you develop any urinary symptoms.     Sincerely,      Levon Giordano MD

## 2022-04-27 NOTE — PROGRESS NOTES
Notified patient per provider Mychart result note. Patient will complete tomorrow. Understanding verbalized.

## 2022-05-01 LAB
APPEARANCE UR: CLEAR
BACTERIA #/AREA URNS HPF: ABNORMAL /[HPF]
BACTERIA UR CULT: NORMAL
BILIRUB UR QL STRIP: NEGATIVE
CASTS URNS QL MICRO: ABNORMAL /LPF
COLOR UR: YELLOW
EPI CELLS #/AREA URNS HPF: ABNORMAL /HPF (ref 0–10)
GLUCOSE UR QL STRIP: ABNORMAL
HGB UR QL STRIP: ABNORMAL
KETONES UR QL STRIP: NEGATIVE
LEUKOCYTE ESTERASE UR QL STRIP: ABNORMAL
MICRO URNS: ABNORMAL
NITRITE UR QL STRIP: NEGATIVE
PH UR STRIP: 5.5 [PH] (ref 5–7.5)
PROT UR QL STRIP: ABNORMAL
RBC #/AREA URNS HPF: ABNORMAL /HPF (ref 0–2)
SP GR UR STRIP: 1.02 (ref 1–1.03)
URINALYSIS REFLEX, 377202: ABNORMAL
UROBILINOGEN UR STRIP-MCNC: 0.2 MG/DL (ref 0.2–1)
WBC #/AREA URNS HPF: ABNORMAL /HPF (ref 0–5)

## 2022-05-12 ENCOUNTER — OFFICE VISIT (OUTPATIENT)
Dept: INTERNAL MEDICINE CLINIC | Age: 67
End: 2022-05-12
Payer: COMMERCIAL

## 2022-05-12 VITALS
TEMPERATURE: 97.3 F | OXYGEN SATURATION: 99 % | SYSTOLIC BLOOD PRESSURE: 138 MMHG | WEIGHT: 209 LBS | DIASTOLIC BLOOD PRESSURE: 76 MMHG | HEIGHT: 64 IN | BODY MASS INDEX: 35.68 KG/M2 | HEART RATE: 72 BPM | RESPIRATION RATE: 16 BRPM

## 2022-05-12 DIAGNOSIS — F32.5 MAJOR DEPRESSIVE DISORDER WITH SINGLE EPISODE, IN FULL REMISSION (HCC): Primary | ICD-10-CM

## 2022-05-12 PROBLEM — F33.1 MAJOR DEPRESSIVE DISORDER, RECURRENT, MODERATE (HCC): Status: RESOLVED | Noted: 2022-04-14 | Resolved: 2022-05-12

## 2022-05-12 PROCEDURE — 99213 OFFICE O/P EST LOW 20 MIN: CPT | Performed by: INTERNAL MEDICINE

## 2022-05-12 PROCEDURE — 3017F COLORECTAL CA SCREEN DOC REV: CPT | Performed by: INTERNAL MEDICINE

## 2022-05-12 PROCEDURE — G8399 PT W/DXA RESULTS DOCUMENT: HCPCS | Performed by: INTERNAL MEDICINE

## 2022-05-12 PROCEDURE — G8752 SYS BP LESS 140: HCPCS | Performed by: INTERNAL MEDICINE

## 2022-05-12 PROCEDURE — G8417 CALC BMI ABV UP PARAM F/U: HCPCS | Performed by: INTERNAL MEDICINE

## 2022-05-12 PROCEDURE — 1090F PRES/ABSN URINE INCON ASSESS: CPT | Performed by: INTERNAL MEDICINE

## 2022-05-12 PROCEDURE — G8536 NO DOC ELDER MAL SCRN: HCPCS | Performed by: INTERNAL MEDICINE

## 2022-05-12 PROCEDURE — G8754 DIAS BP LESS 90: HCPCS | Performed by: INTERNAL MEDICINE

## 2022-05-12 PROCEDURE — G9899 SCRN MAM PERF RSLTS DOC: HCPCS | Performed by: INTERNAL MEDICINE

## 2022-05-12 PROCEDURE — 1101F PT FALLS ASSESS-DOCD LE1/YR: CPT | Performed by: INTERNAL MEDICINE

## 2022-05-12 PROCEDURE — G8427 DOCREV CUR MEDS BY ELIG CLIN: HCPCS | Performed by: INTERNAL MEDICINE

## 2022-05-12 PROCEDURE — G9717 DOC PT DX DEP/BP F/U NT REQ: HCPCS | Performed by: INTERNAL MEDICINE

## 2022-05-12 RX ORDER — ESCITALOPRAM OXALATE 20 MG/1
20 TABLET ORAL DAILY
Qty: 90 TABLET | Refills: 1 | Status: SHIPPED | OUTPATIENT
Start: 2022-05-12

## 2022-05-12 RX ORDER — BUPROPION HYDROCHLORIDE 150 MG/1
150 TABLET ORAL
Qty: 90 TABLET | Refills: 1 | Status: SHIPPED | OUTPATIENT
Start: 2022-05-12

## 2022-05-12 RX ORDER — DILTIAZEM HYDROCHLORIDE 120 MG/1
120 CAPSULE, EXTENDED RELEASE ORAL DAILY
COMMUNITY

## 2022-05-12 NOTE — PROGRESS NOTES
Stefany Chakraborty is a 79 y.o. female who was seen today for a follow-up visit. Assessment & Plan:   1. Major depressive disorder with single episode, in full remission ([facility]  -     escitalopram oxalate (LEXAPRO) 20 mg tablet; Take 1 Tablet by mouth daily. , Normal, Disp-90 Tablet, R-1     Orders Placed This Encounter    buPROPion XL (WELLBUTRIN XL) 150 mg tablet    escitalopram oxalate (LEXAPRO) 20 mg tablet    . Well-controlled on current regimen with addition of Wellbutrin. Subjective:   Stefany Chakraborty was seen for Depression  Since addition of Wellbutrin a month ago she is feeling significantly improved. She notes increased motivation to follow her diabetic diet and be healthier. She has an upcoming appointment with endocrine to help with management of her diabetes which has not been at goal.  She is taking medications with no side effects. 3 most recent PHQ Screens 5/12/2022   Little interest or pleasure in doing things Several days   Feeling down, depressed, irritable, or hopeless Several days   Total Score PHQ 2 2   Trouble falling or staying asleep, or sleeping too much Not at all   Feeling tired or having little energy Several days   Poor appetite, weight loss, or overeating Several days   Feeling bad about yourself - or that you are a failure or have let yourself or your family down Not at all   Trouble concentrating on things such as school, work, reading, or watching TV Not at all   Moving or speaking so slowly that other people could have noticed; or the opposite being so fidgety that others notice Not at all   Thoughts of being better off dead, or hurting yourself in some way Not at all   PHQ 9 Score 4   How difficult have these problems made it for you to do your work, take care of your home and get along with others -          Review of Systems   Respiratory: Negative for shortness of breath. Cardiovascular: Negative for chest pain and leg swelling.    Gastrointestinal: Negative for abdominal pain, constipation and nausea. - per HPI    Physical Exam  Vitals and nursing note reviewed. Constitutional:       General: She is not in acute distress. Appearance: She is well-developed. HENT:      Head: Normocephalic and atraumatic. Cardiovascular:      Rate and Rhythm: Normal rate and regular rhythm. Pulmonary:      Effort: Pulmonary effort is normal.      Breath sounds: Normal breath sounds. Abdominal:      General: Bowel sounds are normal.      Palpations: Abdomen is soft. Tenderness: There is no abdominal tenderness. Psychiatric:         Mood and Affect: Mood normal.       Visit Vitals  /76   Pulse 72   Temp 97.3 °F (36.3 °C) (Temporal)   Resp 16   Ht 5' 4.06\" (1.627 m)   Wt 209 lb (94.8 kg)   SpO2 99%   BMI 35.81 kg/m²        Aspects of this note may have been generated using voice recognition software. Despite editing, there may be some syntax errors   I have discussed the diagnosis with the patient and the intended plan as seen in the above orders. The patient has received an after-visit summary and questions were answered concerning future plans. I have discussed any recommended medication side effects and warnings with the patient as well.   She has expressed understanding of the diagnosis and plan    Kim Contreras MD

## 2022-05-16 LAB — HBA1C MFR BLD HPLC: 9 %

## 2022-07-08 RX ORDER — FLASH GLUCOSE SENSOR
KIT MISCELLANEOUS
Qty: 2 KIT | Refills: 3 | Status: SHIPPED | OUTPATIENT
Start: 2022-07-08 | End: 2022-10-18 | Stop reason: SDUPTHER

## 2022-10-10 DIAGNOSIS — E78.5 HYPERLIPIDEMIA WITH TARGET LDL LESS THAN 100: Primary | ICD-10-CM

## 2022-10-10 RX ORDER — SIMVASTATIN 40 MG/1
40 TABLET, FILM COATED ORAL
Qty: 30 TABLET | Refills: 0 | Status: SHIPPED | OUTPATIENT
Start: 2022-10-10

## 2022-10-10 NOTE — TELEPHONE ENCOUNTER
Requested Prescriptions     Pending Prescriptions Disp Refills    simvastatin (ZOCOR) 40 mg tablet 30 Tablet 5     Sig: Take 1 Tablet by mouth nightly.      [facility]/pharmacy #[id number]  [phone]

## 2022-10-18 ENCOUNTER — TELEPHONE (OUTPATIENT)
Dept: INTERNAL MEDICINE CLINIC | Age: 67
End: 2022-10-18

## 2022-10-18 DIAGNOSIS — E11.29 TYPE 2 DIABETES MELLITUS WITH MICROALBUMINURIA, WITH LONG-TERM CURRENT USE OF INSULIN (HCC): Primary | ICD-10-CM

## 2022-10-18 DIAGNOSIS — R80.9 TYPE 2 DIABETES MELLITUS WITH MICROALBUMINURIA, WITH LONG-TERM CURRENT USE OF INSULIN (HCC): Primary | ICD-10-CM

## 2022-10-18 DIAGNOSIS — Z79.4 TYPE 2 DIABETES MELLITUS WITH MICROALBUMINURIA, WITH LONG-TERM CURRENT USE OF INSULIN (HCC): Primary | ICD-10-CM

## 2022-10-18 RX ORDER — FLASH GLUCOSE SENSOR
KIT MISCELLANEOUS
Qty: 2 KIT | Refills: 3 | Status: SHIPPED | OUTPATIENT
Start: 2022-10-18

## 2022-10-18 NOTE — TELEPHONE ENCOUNTER
"Oncology Rooming Note    June 9, 2017 11:53 AM   Joey Guerrero is a 44 year old male who presents for:    Chief Complaint   Patient presents with     Oncology Clinic Visit     neuroendocrine tumor      Initial Vitals: /55  Pulse 52  Temp 97.4  F (36.3  C) (Oral)  Resp 12  Ht 1.877 m (6' 1.9\")  Wt 110.6 kg (243 lb 14.4 oz)  SpO2 99%  BMI 31.4 kg/m2 Estimated body mass index is 31.4 kg/(m^2) as calculated from the following:    Height as of this encounter: 1.877 m (6' 1.9\").    Weight as of this encounter: 110.6 kg (243 lb 14.4 oz). Body surface area is 2.4 meters squared.  No Pain (0) Comment: Data Unavailable   No LMP for male patient.  Allergies reviewed: Yes  Medications reviewed: Yes    Medications: Medication refills not needed today.  Pharmacy name entered into [facility]:    [facility] [address] [place], [place] - [address] [facility] DRUG STORE [id number] [facility] [address] AT [facility] OF  & MAIN    Clinical concerns: no doc was NOT notified.    5 minutes for nursing intake (face to face time)     Tita Rogers CMA              " Reason for call:   Patient and pharmacy have requested a refill on the Hinckley 2 free style sensor. Patient is leaving to go out of town and will need to change the sensor while she is away.   [phone]    Is this a new problem: yes     Date of last appointment:  5/12/2022     Can we respond via [employer]: no    Best call back number:     Ra Vernon (Self) [phone] [facility])

## 2022-11-04 RX ORDER — ALENDRONATE SODIUM 70 MG/1
TABLET ORAL
Qty: 4 TABLET | Refills: 1 | Status: SHIPPED | OUTPATIENT
Start: 2022-11-04

## 2022-11-10 LAB
CREATININE, EXTERNAL: 0.88
HBA1C MFR BLD HPLC: 10.7 %
LDL-C, EXTERNAL: 72
MICROALBUMIN UR TEST STR-MCNC: 346.4 MG/DL

## 2022-11-22 ENCOUNTER — OFFICE VISIT (OUTPATIENT)
Dept: INTERNAL MEDICINE CLINIC | Age: 67
End: 2022-11-22
Payer: COMMERCIAL

## 2022-11-22 VITALS
OXYGEN SATURATION: 96 % | TEMPERATURE: 97.3 F | HEIGHT: 64 IN | RESPIRATION RATE: 16 BRPM | WEIGHT: 198.6 LBS | DIASTOLIC BLOOD PRESSURE: 72 MMHG | HEART RATE: 75 BPM | SYSTOLIC BLOOD PRESSURE: 122 MMHG | BODY MASS INDEX: 33.9 KG/M2

## 2022-11-22 DIAGNOSIS — R80.9 TYPE 2 DIABETES MELLITUS WITH MICROALBUMINURIA, WITH LONG-TERM CURRENT USE OF INSULIN (HCC): Primary | ICD-10-CM

## 2022-11-22 DIAGNOSIS — Z79.4 TYPE 2 DIABETES MELLITUS WITH MICROALBUMINURIA, WITH LONG-TERM CURRENT USE OF INSULIN (HCC): Primary | ICD-10-CM

## 2022-11-22 DIAGNOSIS — F32.5 MAJOR DEPRESSIVE DISORDER WITH SINGLE EPISODE, IN FULL REMISSION (HCC): ICD-10-CM

## 2022-11-22 DIAGNOSIS — E66.09 CLASS 1 OBESITY DUE TO EXCESS CALORIES WITH SERIOUS COMORBIDITY AND BODY MASS INDEX (BMI) OF 34.0 TO 34.9 IN ADULT: ICD-10-CM

## 2022-11-22 DIAGNOSIS — I10 ESSENTIAL HYPERTENSION: ICD-10-CM

## 2022-11-22 DIAGNOSIS — Z78.0 POSTMENOPAUSAL: ICD-10-CM

## 2022-11-22 DIAGNOSIS — E78.5 HYPERLIPIDEMIA WITH TARGET LDL LESS THAN 100: ICD-10-CM

## 2022-11-22 DIAGNOSIS — E11.29 TYPE 2 DIABETES MELLITUS WITH MICROALBUMINURIA, WITH LONG-TERM CURRENT USE OF INSULIN (HCC): Primary | ICD-10-CM

## 2022-11-22 DIAGNOSIS — Z12.31 VISIT FOR SCREENING MAMMOGRAM: ICD-10-CM

## 2022-11-22 DIAGNOSIS — R91.1 PULMONARY NODULE: ICD-10-CM

## 2022-11-22 DIAGNOSIS — M85.89 OSTEOPENIA OF MULTIPLE SITES: ICD-10-CM

## 2022-11-22 PROBLEM — N18.31 CHRONIC RENAL IMPAIRMENT, STAGE 3A (HCC): Status: RESOLVED | Noted: 2022-04-14 | Resolved: 2022-11-22

## 2022-11-22 PROBLEM — E66.01 SEVERE OBESITY WITH BODY MASS INDEX (BMI) OF 35.0 TO 39.9 WITH SERIOUS COMORBIDITY (HCC): Status: RESOLVED | Noted: 2018-06-22 | Resolved: 2022-11-22

## 2022-11-22 PROCEDURE — 1090F PRES/ABSN URINE INCON ASSESS: CPT | Performed by: INTERNAL MEDICINE

## 2022-11-22 PROCEDURE — G8754 DIAS BP LESS 90: HCPCS | Performed by: INTERNAL MEDICINE

## 2022-11-22 PROCEDURE — G8752 SYS BP LESS 140: HCPCS | Performed by: INTERNAL MEDICINE

## 2022-11-22 PROCEDURE — 2022F DILAT RTA XM EVC RTNOPTHY: CPT | Performed by: INTERNAL MEDICINE

## 2022-11-22 PROCEDURE — G8427 DOCREV CUR MEDS BY ELIG CLIN: HCPCS | Performed by: INTERNAL MEDICINE

## 2022-11-22 PROCEDURE — G8536 NO DOC ELDER MAL SCRN: HCPCS | Performed by: INTERNAL MEDICINE

## 2022-11-22 PROCEDURE — G8399 PT W/DXA RESULTS DOCUMENT: HCPCS | Performed by: INTERNAL MEDICINE

## 2022-11-22 PROCEDURE — 3017F COLORECTAL CA SCREEN DOC REV: CPT | Performed by: INTERNAL MEDICINE

## 2022-11-22 PROCEDURE — G8417 CALC BMI ABV UP PARAM F/U: HCPCS | Performed by: INTERNAL MEDICINE

## 2022-11-22 PROCEDURE — G9899 SCRN MAM PERF RSLTS DOC: HCPCS | Performed by: INTERNAL MEDICINE

## 2022-11-22 PROCEDURE — 1101F PT FALLS ASSESS-DOCD LE1/YR: CPT | Performed by: INTERNAL MEDICINE

## 2022-11-22 PROCEDURE — G9717 DOC PT DX DEP/BP F/U NT REQ: HCPCS | Performed by: INTERNAL MEDICINE

## 2022-11-22 PROCEDURE — 99214 OFFICE O/P EST MOD 30 MIN: CPT | Performed by: INTERNAL MEDICINE

## 2022-11-22 RX ORDER — ROSUVASTATIN CALCIUM 10 MG/1
10 TABLET, COATED ORAL
Qty: 90 TABLET | Refills: 0 | Status: SHIPPED | OUTPATIENT
Start: 2022-11-22

## 2022-11-22 NOTE — PROGRESS NOTES
Ayaan Fregoso is a 79 y.o. female who was seen today for a follow-up visit. Assessment & Plan:   1. Type 2 diabetes mellitus with microalbuminuria, with long-term current use of insulin ([facility])  Assessment & Plan:   uncontrolled, continue current medications and endocrine recently adjusted there insulin dose. Working on [address] [place] [address]. 2. Hyperlipidemia with target LDL less than 100  Assessment & Plan:   well controlled, Her cardiologist has her on diltiazem which is a potential interaction with her simvastatin and reviewed changing to Crestor which she is agreeable to. We will plan to recheck a lipid panel AST ALT and in 3 months and she will follow-up appointment in 3 months. She will contact me if any muscle aches or side effects on the rosuvastatin. Orders:  -     CBC WITH AUTOMATED DIFF; Future  -     LIPID PANEL; Future  -     AST; Future  -     ALT; Future  3. Essential hypertension  Assessment & Plan:   well controlled, continue current medications  4. Major depressive disorder with single episode, in full remission [facility])  Assessment & Plan:   well controlled, continue current medications  5. Pulmonary nodule  Assessment & Plan:   Due for repeat chest CT to monitor pulmonary nodules. Patient agreeable plan and chest CT ordered. Orders:  -     CT CHEST WO CONT; Future  6. Osteopenia of multiple sites  Assessment & Plan:  hx of humerus fx from [facility] and has wanted to be aggressive with management and started on fosamax in 2021. Tolerating med well and no gerd and reviewed ca vit D and exercise. Reviewed last bone density. Continue with Fosamax. Due for repeat bone density. 7. Postmenopausal  -     DEXA BONE DENSITY STUDY AXIAL; Future  8. Class 1 obesity due to excess calories with serious comorbidity and body mass index (BMI) of 34.0 to 34.9 in adult  9. Visit for screening mammogram  -     [facility] 3D GRANT W MAMMO BI SCREENING INCL CAD;  Future    Follow-up and Dispositions    Return in about 3 months (around 2/22/2023) for follow up. Subjective:   Joanne Bernabe was seen for follow-up diabetes, hyperlipidemia, hypertension, depression. Taking medication with no side effects. Has been able to lose 11 pounds. She is followed by endocrine who has recently adjusted her insulin as her blood sugars have not been controlled. She brings in lab work from endocrine and A1c is 10.7. Exercise: walking Diet not following diabetic diet but has started working on improving her diet. Osteopenia with hx of humerus fx from [facility] and has wanted to be aggressive with management and started on fosamax in 2021. Tolerating med well and no gerd and ca vit D and exercise. Review of Systems   Respiratory:  Negative for shortness of breath. Cardiovascular:  Negative for chest pain and leg swelling. Gastrointestinal:  Negative for abdominal pain and heartburn. - per HPI    Physical Exam  Vitals and nursing note reviewed. Constitutional:       General: She is not in acute distress. Appearance: She is well-developed. HENT:      Head: Normocephalic and atraumatic. Cardiovascular:      Rate and Rhythm: Normal rate and regular rhythm. Pulmonary:      Effort: Pulmonary effort is normal.      Breath sounds: Normal breath sounds. No wheezing. Abdominal:      General: Bowel sounds are normal. There is no distension. Palpations: Abdomen is soft. There is no mass. Tenderness: There is no abdominal tenderness. Musculoskeletal:      Right lower leg: No edema. Left lower leg: No edema. Psychiatric:         Mood and Affect: Mood normal.     Visit Vitals  /72   Pulse 75   Temp 97.3 °F (36.3 °C) (Temporal)   Resp 16   Ht 5' 4.06\" (1.627 m)   Wt 198 lb 9.6 oz (90.1 kg)   SpO2 96%   BMI 34.03 kg/m²      Aspects of this note may have been generated using voice recognition software.  Despite editing, there may be some syntax errors   I have discussed the diagnosis with the patient and the intended plan as seen in the above orders. The patient has received an after-visit summary and questions were answered concerning future plans. I have discussed any recommended medication side effects and warnings with the patient as well.   She has expressed understanding of the diagnosis and plan    Tyler Braun MD

## 2022-11-22 NOTE — ASSESSMENT & PLAN NOTE
well controlled, Her cardiologist has her on diltiazem which is a potential interaction with her simvastatin and reviewed changing to Crestor which she is agreeable to. We will plan to recheck a lipid panel AST ALT and in 3 months and she will follow-up appointment in 3 months. She will contact me if any muscle aches or side effects on the rosuvastatin.

## 2022-11-22 NOTE — ASSESSMENT & PLAN NOTE
uncontrolled, continue current medications and endocrine recently adjusted there insulin dose. Working on [address]

## 2022-11-22 NOTE — ASSESSMENT & PLAN NOTE
hx of humerus fx from [facility] and has wanted to be aggressive with management and started on fosamax in 2021. Tolerating med well and no gerd and reviewed ca vit D and exercise. Reviewed last bone density. Continue with Fosamax. Due for repeat bone density.

## 2022-12-06 RX ORDER — ALENDRONATE SODIUM 70 MG/1
TABLET ORAL
Qty: 4 TABLET | Refills: 1 | Status: SHIPPED | OUTPATIENT
Start: 2022-12-06

## 2022-12-20 ENCOUNTER — HOSPITAL ENCOUNTER (OUTPATIENT)
Dept: CT IMAGING | Age: 67
Discharge: HOME | End: 2022-12-20
Attending: INTERNAL MEDICINE
Payer: COMMERCIAL

## 2022-12-20 ENCOUNTER — HOSPITAL ENCOUNTER (OUTPATIENT)
Dept: BONE DENSITY | Age: 67
Discharge: HOME | End: 2022-12-20
Attending: INTERNAL MEDICINE
Payer: COMMERCIAL

## 2022-12-20 ENCOUNTER — APPOINTMENT (OUTPATIENT)
Dept: MAMMOGRAPHY | Age: 67
End: 2022-12-20
Attending: INTERNAL MEDICINE
Payer: COMMERCIAL

## 2022-12-20 DIAGNOSIS — R91.1 PULMONARY NODULE: ICD-10-CM

## 2022-12-20 DIAGNOSIS — Z78.0 POSTMENOPAUSAL: ICD-10-CM

## 2022-12-20 PROCEDURE — 71250 CT THORAX DX C-: CPT

## 2022-12-20 PROCEDURE — 77080 DXA BONE DENSITY AXIAL: CPT

## 2022-12-23 DIAGNOSIS — M81.0 AGE RELATED OSTEOPOROSIS, UNSPECIFIED PATHOLOGICAL FRACTURE PRESENCE: Primary | ICD-10-CM

## 2022-12-23 DIAGNOSIS — M81.0 AGE-RELATED OSTEOPOROSIS WITHOUT CURRENT PATHOLOGICAL FRACTURE: ICD-10-CM

## 2022-12-23 DIAGNOSIS — E78.5 HYPERLIPIDEMIA WITH TARGET LDL LESS THAN 100: ICD-10-CM

## 2022-12-23 NOTE — PROGRESS NOTES
See phone note. Reviewed with patient. Continue with Fosamax. Recheck bone density in 2 years. Calcium, vitamin D and weightbearing exercise.

## 2022-12-23 NOTE — PROGRESS NOTES
Fax copy of chest CT to pulmonary Dr Luke Singh. See phone note with patient. Reviewed recent findings on chest CT and increasing size of 2 nodules in right and left lower lobe of lung. She is referred back to her pulmonologist for evaluation and management of suspicious lung nodules. She will contact Dr. Luke Singh to make appointment.

## 2022-12-23 NOTE — PROGRESS NOTES
Reviewed with patient findings of recent chest CT for follow-up pulmonary nodule. 2 nodules noted to right lower lobe and left lower lobe increased in size the largest measuring 7 mm and were considered suspicious. He is recommended to have a chest CT repeat in 3 months. I have referred her back to her pulmonologist Dr. Ector Denver for further evaluation and management of changes in these pulmonary nodules which are of concern. She is agreeable to this plan and will contact Dr. Jonathan Frances office for an appointment. In addition reviewed her bone density which shows significant fracture risk she is currently on Fosamax for the past year and this was started after a fracture of her humerus from a ground-level fall in 2021. In 2022 she has fallen 2 times fractured ribs. As she is only been on 1 year Fosamax would continue with Fosamax and counseled regarding 1200 mg of calcium a day via supplement as she gets very little in her diet. Also recommend vitamin D3 1000 international units daily. And we will check her vitamin D level.

## 2022-12-31 LAB — HBA1C MFR BLD HPLC: 12.2 %

## 2023-01-06 ENCOUNTER — PATIENT OUTREACH (OUTPATIENT)
Dept: CASE MANAGEMENT | Age: 68
End: 2023-01-06

## 2023-01-06 NOTE — PROGRESS NOTES
Ambulatory Care Management Note    Date/Time:  1/6/2023 12:49 PM    This patient was received as a referral from [address] [facility]. Ambulatory Care Manager outreached to patient today to offer care management services. Introduction to self and role of care manager provided. Pt was asked if she would like this ACM to assist her with DM mgmt (A1c on 11/10/22 was 10.7). Pt replied, \"No thank you. I'm working with my doctor on getting this down, but thank you for asking. \"  Following the call, ACM sent pt a reminder of recommended future appt's and testing issued by her PCP via [employer] message. Patient declined care management services at this time. No follow up call was scheduled. However, patient has Ambulatory Care Manager's contact number for any questions or concerns going forward and was encouraged to call prn.    Pt verbalized acknowledgment.  Soham Denson

## 2023-01-09 RX ORDER — ALENDRONATE SODIUM 70 MG/1
TABLET ORAL
Qty: 4 TABLET | Refills: 1 | Status: SHIPPED | OUTPATIENT
Start: 2023-01-09

## 2023-02-09 RX ORDER — ALENDRONATE SODIUM 70 MG/1
TABLET ORAL
Qty: 4 TABLET | Refills: 1 | Status: SHIPPED | OUTPATIENT
Start: 2023-02-09

## 2023-02-23 ENCOUNTER — PATIENT OUTREACH (OUTPATIENT)
Dept: CASE MANAGEMENT | Age: 68
End: 2023-02-23

## 2023-02-23 NOTE — PROGRESS NOTES
Called patient to discuss DM program/CCM. Patient reports she is working with her endocrinologist and declines CCM at this time. She shared that she has attended diabetes education programs in the past.   Discussed pending labs and lab slip mailed to patient. She will call to schedule appt with [facility] after labs drawn. Mammogram due, provided scheduling phone number.

## 2023-03-07 ENCOUNTER — TRANSCRIBE ORDER (OUTPATIENT)
Dept: SCHEDULING | Age: 68
End: 2023-03-07

## 2023-03-07 DIAGNOSIS — Z12.31 VISIT FOR SCREENING MAMMOGRAM: Primary | ICD-10-CM

## 2023-03-13 ENCOUNTER — HOSPITAL ENCOUNTER (OUTPATIENT)
Dept: MAMMOGRAPHY | Age: 68
Discharge: HOME | End: 2023-03-13
Attending: INTERNAL MEDICINE
Payer: COMMERCIAL

## 2023-03-13 DIAGNOSIS — Z12.31 VISIT FOR SCREENING MAMMOGRAM: ICD-10-CM

## 2023-03-13 PROCEDURE — 77063 BREAST TOMOSYNTHESIS BI: CPT

## 2023-03-29 LAB
25(OH)D3+25(OH)D2 SERPL-MCNC: 15.5 NG/ML (ref 30–100)
ALBUMIN SERPL-MCNC: 4.2 G/DL (ref 3.8–4.8)
ALBUMIN/GLOB SERPL: 1.7 {RATIO} (ref 1.2–2.2)
ALP SERPL-CCNC: 95 IU/L (ref 44–121)
ALT SERPL-CCNC: 14 IU/L (ref 0–32)
AST SERPL-CCNC: 16 IU/L (ref 0–40)
BASOPHILS # BLD AUTO: 0 X10E3/UL (ref 0–0.2)
BASOPHILS NFR BLD AUTO: 0 %
BILIRUB SERPL-MCNC: <0.2 MG/DL (ref 0–1.2)
BUN SERPL-MCNC: 34 MG/DL (ref 8–27)
BUN/CREAT SERPL: 25 (ref 12–28)
CALCIUM SERPL-MCNC: 9.7 MG/DL (ref 8.7–10.3)
CHLORIDE SERPL-SCNC: 98 MMOL/L (ref 96–106)
CHOLEST SERPL-MCNC: 213 MG/DL (ref 100–199)
CO2 SERPL-SCNC: 23 MMOL/L (ref 20–29)
CREAT SERPL-MCNC: 1.34 MG/DL (ref 0.57–1)
EGFRCR SERPLBLD CKD-EPI 2021: 43 ML/MIN/1.73
EOSINOPHIL # BLD AUTO: 0.2 X10E3/UL (ref 0–0.4)
EOSINOPHIL NFR BLD AUTO: 2 %
ERYTHROCYTE [DISTWIDTH] IN BLOOD BY AUTOMATED COUNT: 14 % (ref 11.7–15.4)
GLOBULIN SER CALC-MCNC: 2.5 G/DL (ref 1.5–4.5)
GLUCOSE SERPL-MCNC: 219 MG/DL (ref 70–99)
HCT VFR BLD AUTO: 37.4 % (ref 34–46.6)
HDLC SERPL-MCNC: 46 MG/DL
HGB BLD-MCNC: 12.3 G/DL (ref 11.1–15.9)
IMM GRANULOCYTES # BLD AUTO: 0 X10E3/UL (ref 0–0.1)
IMM GRANULOCYTES NFR BLD AUTO: 1 %
LDLC SERPL CALC-MCNC: 117 MG/DL (ref 0–99)
LYMPHOCYTES # BLD AUTO: 2.4 X10E3/UL (ref 0.7–3.1)
LYMPHOCYTES NFR BLD AUTO: 29 %
MCH RBC QN AUTO: 28 PG (ref 26.6–33)
MCHC RBC AUTO-ENTMCNC: 32.9 G/DL (ref 31.5–35.7)
MCV RBC AUTO: 85 FL (ref 79–97)
MONOCYTES # BLD AUTO: 0.4 X10E3/UL (ref 0.1–0.9)
MONOCYTES NFR BLD AUTO: 4 %
NEUTROPHILS # BLD AUTO: 5.3 X10E3/UL (ref 1.4–7)
NEUTROPHILS NFR BLD AUTO: 64 %
PLATELET # BLD AUTO: 264 X10E3/UL (ref 150–450)
POTASSIUM SERPL-SCNC: 4.7 MMOL/L (ref 3.5–5.2)
PROT SERPL-MCNC: 6.7 G/DL (ref 6–8.5)
RBC # BLD AUTO: 4.39 X10E6/UL (ref 3.77–5.28)
SODIUM SERPL-SCNC: 139 MMOL/L (ref 134–144)
TRIGL SERPL-MCNC: 288 MG/DL (ref 0–149)
VLDLC SERPL CALC-MCNC: 50 MG/DL (ref 5–40)
WBC # BLD AUTO: 8.3 X10E3/UL (ref 3.4–10.8)

## 2023-04-04 ENCOUNTER — TELEPHONE (OUTPATIENT)
Dept: INTERNAL MEDICINE CLINIC | Age: 68
End: 2023-04-04

## 2023-04-04 RX ORDER — ROSUVASTATIN CALCIUM 20 MG/1
20 TABLET, COATED ORAL
Qty: 90 TABLET | Refills: 0 | Status: SHIPPED
Start: 2023-04-04

## 2023-04-04 RX ORDER — ERGOCALCIFEROL 1.25 MG/1
50000 CAPSULE ORAL
Qty: 12 CAPSULE | Refills: 0 | Status: SHIPPED
Start: 2023-04-04

## 2023-04-04 NOTE — TELEPHONE ENCOUNTER
Verified patient identity with two identifiers. Spoke with patient by phone. Per Dr Murtaza Darnell informed patient of need [facility]  [facility]. Gave message from MD regarding lab results and change in medication. Patient verbalized understanding.

## 2023-06-15 PROBLEM — Z87.19 HISTORY OF ACUTE PANCREATITIS: Status: ACTIVE | Noted: 2023-06-15

## 2023-07-05 ENCOUNTER — TELEPHONE (OUTPATIENT)
Age: 68
End: 2023-07-05

## 2023-07-05 RX ORDER — NYSTATIN 100000 [USP'U]/G
POWDER TOPICAL
Qty: 60 G | Refills: 1 | Status: SHIPPED | OUTPATIENT
Start: 2023-07-05

## 2023-07-05 NOTE — TELEPHONE ENCOUNTER
Medication Refill Request    Tom France is requesting a refill of the following medication(s):   Nystop 100,000 unit/GM Powder  Please send refill to:     [facility] [id number] [facility] [phone]  [address] [place] [address]  Phone: [phone] Fax: [phone]

## 2023-07-10 DIAGNOSIS — E78.5 HYPERLIPIDEMIA WITH TARGET LDL LESS THAN 100: ICD-10-CM

## 2023-07-10 DIAGNOSIS — I10 ESSENTIAL HYPERTENSION: Primary | ICD-10-CM

## 2023-07-11 RX ORDER — FENOFIBRATE 54 MG/1
TABLET ORAL
Qty: 90 TABLET | Refills: 1 | Status: SHIPPED | OUTPATIENT
Start: 2023-07-11

## 2023-08-04 ENCOUNTER — HOSPITAL ENCOUNTER (OUTPATIENT)
Age: 68
Discharge: HOME | End: 2023-08-04
Payer: COMMERCIAL

## 2023-08-04 DIAGNOSIS — S69.91XA RIGHT WRIST INJURY, INITIAL ENCOUNTER: ICD-10-CM

## 2023-08-04 PROCEDURE — 73221 MRI JOINT UPR EXTREM W/O DYE: CPT

## 2023-08-17 RX ORDER — OMEPRAZOLE 20 MG/1
CAPSULE, DELAYED RELEASE ORAL
Qty: 90 CAPSULE | Refills: 3 | Status: SHIPPED | OUTPATIENT
Start: 2023-08-17

## 2023-08-23 DIAGNOSIS — E55.9 VITAMIN D DEFICIENCY, UNSPECIFIED: ICD-10-CM

## 2023-08-23 DIAGNOSIS — F32.5 MAJOR DEPRESSIVE DISORDER, SINGLE EPISODE, IN FULL REMISSION (HCC): ICD-10-CM

## 2023-08-24 RX ORDER — ERGOCALCIFEROL 1.25 MG/1
CAPSULE ORAL
Qty: 12 CAPSULE | Refills: 1 | Status: SHIPPED | OUTPATIENT
Start: 2023-08-24

## 2023-08-25 RX ORDER — ESCITALOPRAM OXALATE 20 MG/1
TABLET ORAL
Qty: 90 TABLET | Refills: 0 | Status: SHIPPED | OUTPATIENT
Start: 2023-08-25 | End: 2023-10-20 | Stop reason: SDUPTHER

## 2023-08-25 RX ORDER — ALLOPURINOL 300 MG/1
TABLET ORAL
Qty: 90 TABLET | Refills: 0 | Status: SHIPPED | OUTPATIENT
Start: 2023-08-25 | End: 2023-10-20 | Stop reason: SDUPTHER

## 2023-08-25 RX ORDER — VALSARTAN 160 MG/1
TABLET ORAL
Qty: 90 TABLET | Refills: 0 | Status: SHIPPED | OUTPATIENT
Start: 2023-08-25 | End: 2023-10-20 | Stop reason: SDUPTHER

## 2023-08-25 RX ORDER — BUPROPION HYDROCHLORIDE 150 MG/1
TABLET ORAL
Qty: 90 TABLET | Refills: 0 | Status: SHIPPED | OUTPATIENT
Start: 2023-08-25 | End: 2023-10-20 | Stop reason: SDUPTHER

## 2023-10-04 ENCOUNTER — OFFICE VISIT (OUTPATIENT)
Age: 68
End: 2023-10-04
Payer: MEDICARE

## 2023-10-04 VITALS
SYSTOLIC BLOOD PRESSURE: 145 MMHG | TEMPERATURE: 98.4 F | DIASTOLIC BLOOD PRESSURE: 83 MMHG | WEIGHT: 200.4 LBS | RESPIRATION RATE: 16 BRPM | OXYGEN SATURATION: 96 % | HEIGHT: 64 IN | BODY MASS INDEX: 34.21 KG/M2 | HEART RATE: 87 BPM

## 2023-10-04 DIAGNOSIS — E78.5 HYPERLIPIDEMIA WITH TARGET LDL LESS THAN 100: ICD-10-CM

## 2023-10-04 DIAGNOSIS — F33.1 MODERATE EPISODE OF RECURRENT MAJOR DEPRESSIVE DISORDER (HCC): ICD-10-CM

## 2023-10-04 DIAGNOSIS — E11.29 TYPE 2 DIABETES MELLITUS WITH MICROALBUMINURIA, WITH LONG-TERM CURRENT USE OF INSULIN (HCC): ICD-10-CM

## 2023-10-04 DIAGNOSIS — Z79.4 TYPE 2 DIABETES MELLITUS WITH MICROALBUMINURIA, WITH LONG-TERM CURRENT USE OF INSULIN (HCC): ICD-10-CM

## 2023-10-04 DIAGNOSIS — J06.9 VIRAL URI WITH COUGH: ICD-10-CM

## 2023-10-04 DIAGNOSIS — E55.9 VITAMIN D DEFICIENCY, UNSPECIFIED: ICD-10-CM

## 2023-10-04 DIAGNOSIS — R05.1 ACUTE COUGH: ICD-10-CM

## 2023-10-04 DIAGNOSIS — R80.9 TYPE 2 DIABETES MELLITUS WITH MICROALBUMINURIA, WITH LONG-TERM CURRENT USE OF INSULIN (HCC): ICD-10-CM

## 2023-10-04 DIAGNOSIS — E66.09 CLASS 1 OBESITY DUE TO EXCESS CALORIES WITH SERIOUS COMORBIDITY AND BODY MASS INDEX (BMI) OF 34.0 TO 34.9 IN ADULT: ICD-10-CM

## 2023-10-04 DIAGNOSIS — I10 ESSENTIAL HYPERTENSION: Primary | ICD-10-CM

## 2023-10-04 LAB
EXP DATE SOLUTION: NORMAL
EXP DATE SWAB: NORMAL
EXPIRATION DATE: NORMAL
LOT NUMBER POC: NORMAL
LOT NUMBER SOLUTION: NORMAL
LOT NUMBER SWAB: NORMAL
SARS-COV-2 RNA, POC: NEGATIVE

## 2023-10-04 PROCEDURE — 1036F TOBACCO NON-USER: CPT | Performed by: INTERNAL MEDICINE

## 2023-10-04 PROCEDURE — 1123F ACP DISCUSS/DSCN MKR DOCD: CPT | Performed by: INTERNAL MEDICINE

## 2023-10-04 PROCEDURE — G8417 CALC BMI ABV UP PARAM F/U: HCPCS | Performed by: INTERNAL MEDICINE

## 2023-10-04 PROCEDURE — 2022F DILAT RTA XM EVC RTNOPTHY: CPT | Performed by: INTERNAL MEDICINE

## 2023-10-04 PROCEDURE — 87635 SARS-COV-2 COVID-19 AMP PRB: CPT | Performed by: INTERNAL MEDICINE

## 2023-10-04 PROCEDURE — G8484 FLU IMMUNIZE NO ADMIN: HCPCS | Performed by: INTERNAL MEDICINE

## 2023-10-04 PROCEDURE — 99214 OFFICE O/P EST MOD 30 MIN: CPT | Performed by: INTERNAL MEDICINE

## 2023-10-04 PROCEDURE — G8427 DOCREV CUR MEDS BY ELIG CLIN: HCPCS | Performed by: INTERNAL MEDICINE

## 2023-10-04 PROCEDURE — 3017F COLORECTAL CA SCREEN DOC REV: CPT | Performed by: INTERNAL MEDICINE

## 2023-10-04 PROCEDURE — 1090F PRES/ABSN URINE INCON ASSESS: CPT | Performed by: INTERNAL MEDICINE

## 2023-10-04 PROCEDURE — PBSHW AMB POC COVID-19 COV: Performed by: INTERNAL MEDICINE

## 2023-10-04 PROCEDURE — 3078F DIAST BP <80 MM HG: CPT | Performed by: INTERNAL MEDICINE

## 2023-10-04 PROCEDURE — 3046F HEMOGLOBIN A1C LEVEL >9.0%: CPT | Performed by: INTERNAL MEDICINE

## 2023-10-04 PROCEDURE — G8399 PT W/DXA RESULTS DOCUMENT: HCPCS | Performed by: INTERNAL MEDICINE

## 2023-10-04 PROCEDURE — 3074F SYST BP LT 130 MM HG: CPT | Performed by: INTERNAL MEDICINE

## 2023-10-04 ASSESSMENT — PATIENT HEALTH QUESTIONNAIRE - PHQ9
7. TROUBLE CONCENTRATING ON THINGS, SUCH AS READING THE NEWSPAPER OR WATCHING TELEVISION: 2
4. FEELING TIRED OR HAVING LITTLE ENERGY: 3
1. LITTLE INTEREST OR PLEASURE IN DOING THINGS: 2
2. FEELING DOWN, DEPRESSED OR HOPELESS: 1
SUM OF ALL RESPONSES TO PHQ9 QUESTIONS 1 & 2: 3
SUM OF ALL RESPONSES TO PHQ QUESTIONS 1-9: 12
SUM OF ALL RESPONSES TO PHQ QUESTIONS 1-9: 12
3. TROUBLE FALLING OR STAYING ASLEEP: 2
8. MOVING OR SPEAKING SO SLOWLY THAT OTHER PEOPLE COULD HAVE NOTICED. OR THE OPPOSITE, BEING SO FIGETY OR RESTLESS THAT YOU HAVE BEEN MOVING AROUND A LOT MORE THAN USUAL: 0
10. IF YOU CHECKED OFF ANY PROBLEMS, HOW DIFFICULT HAVE THESE PROBLEMS MADE IT FOR YOU TO DO YOUR WORK, TAKE CARE OF THINGS AT HOME, OR GET ALONG WITH OTHER PEOPLE: 1
5. POOR APPETITE OR OVEREATING: 1
SUM OF ALL RESPONSES TO PHQ QUESTIONS 1-9: 12
SUM OF ALL RESPONSES TO PHQ QUESTIONS 1-9: 12
9. THOUGHTS THAT YOU WOULD BE BETTER OFF DEAD, OR OF HURTING YOURSELF: 0
6. FEELING BAD ABOUT YOURSELF - OR THAT YOU ARE A FAILURE OR HAVE LET YOURSELF OR YOUR FAMILY DOWN: 1

## 2023-10-04 NOTE — PROGRESS NOTES
Frankey Harness is a 76 y.o. female who was seen in clinic today (10/4/2023). Assessment & Plan:   Below is the assessment and plan developed based on review of pertinent history, physical exam, labs, studies, and medications. 1. Essential hypertension  Assessment & Plan:   Borderline controlled, continue current medications and lifestyle modifications recommended  2. Moderate episode of recurrent major depressive disorder [facility])  Assessment & Plan:   Uncontrolled, Reviewed referral to psychiatry and recommendation for counseling. She does not wish to pursue this at this time. Denies any suicidal ideation we will continue with current medications. 3. Acute cough  -     AMB POC COVID-19 COV  4. Viral URI with cough  Comments:  Counseled and continue with over-the-counter medications for symptomatic relief. COVID-negative today. 5. Type 2 diabetes mellitus with microalbuminuria, with long-term current use of insulin ([address])  Assessment & Plan:   Not at goal severely out-of-control. Reviewed with her importance of controlling diabetes and concern for end organ damage. She is followed by Dr. Anita Mckinney in endocrinology and have asked her to reach out to her endocrinologist to assist her with adjusting her medications and to follow a diabetic diet. We will also send her most recent lab work. Recheck A1c today. Orders:  -     Hemoglobin A1C; Future  -     Basic Metabolic Panel; Future  6. Vitamin D deficiency, unspecified  -     Vitamin D 25 Hydroxy; Future  7. Class 1 obesity due to excess calories with serious comorbidity and body mass index (BMI) of 34.0 to 34.9 in adult  Assessment & Plan:   . Counseled regarding elevated BMI and current weight goals. Reviewed weight loss strategies including dietary changes and exercise.      8. Hyperlipidemia with target LDL less than 100  Assessment & Plan:   At goal, continue current medications and lifestyle modifications recommended     Return in about 3 months

## 2023-10-05 PROBLEM — F33.0 MAJOR DEPRESSIVE DISORDER, RECURRENT, MILD (HCC): Status: ACTIVE | Noted: 2023-10-05

## 2023-10-05 PROBLEM — F33.0 MAJOR DEPRESSIVE DISORDER, RECURRENT, MILD (HCC): Status: RESOLVED | Noted: 2023-10-05 | Resolved: 2023-10-05

## 2023-10-05 PROBLEM — F33.9 MAJOR DEPRESSIVE DISORDER, RECURRENT, UNSPECIFIED (HCC): Status: ACTIVE | Noted: 2023-10-05

## 2023-10-05 ASSESSMENT — ENCOUNTER SYMPTOMS
ABDOMINAL PAIN: 0
SHORTNESS OF BREATH: 0

## 2023-10-05 NOTE — ASSESSMENT & PLAN NOTE
Uncontrolled, Reviewed referral to psychiatry and recommendation for counseling. She does not wish to pursue this at this time. Denies any suicidal ideation we will continue with current medications.

## 2023-10-05 NOTE — ASSESSMENT & PLAN NOTE
.Counseled regarding elevated BMI and current weight goals. Reviewed weight loss strategies including dietary changes and exercise.

## 2023-10-05 NOTE — ASSESSMENT & PLAN NOTE
Not at goal severely out-of-control. Reviewed with her importance of controlling diabetes and concern for end organ damage. She is followed by Dr. Juan Solo in endocrinology and have asked her to reach out to her endocrinologist to assist her with adjusting her medications and to follow a diabetic diet.

## 2023-10-10 LAB
25(OH)D3+25(OH)D2 SERPL-MCNC: 33 NG/ML (ref 30–100)
BUN SERPL-MCNC: 25 MG/DL (ref 8–27)
BUN/CREAT SERPL: 25 (ref 12–28)
CALCIUM SERPL-MCNC: 9.4 MG/DL (ref 8.7–10.3)
CHLORIDE SERPL-SCNC: 99 MMOL/L (ref 96–106)
CO2 SERPL-SCNC: 20 MMOL/L (ref 20–29)
CREAT SERPL-MCNC: 1 MG/DL (ref 0.57–1)
EGFRCR SERPLBLD CKD-EPI 2021: 61 ML/MIN/1.73
GLUCOSE SERPL-MCNC: 164 MG/DL (ref 70–99)
HBA1C MFR BLD: 11 % (ref 4.8–5.6)
POTASSIUM SERPL-SCNC: 4.8 MMOL/L (ref 3.5–5.2)
SODIUM SERPL-SCNC: 140 MMOL/L (ref 134–144)

## 2023-10-20 DIAGNOSIS — E78.5 HYPERLIPIDEMIA WITH TARGET LDL LESS THAN 100: ICD-10-CM

## 2023-10-20 DIAGNOSIS — F32.5 MAJOR DEPRESSIVE DISORDER, SINGLE EPISODE, IN FULL REMISSION (HCC): ICD-10-CM

## 2023-10-20 RX ORDER — ROSUVASTATIN CALCIUM 10 MG/1
10 TABLET, COATED ORAL DAILY
Qty: 90 TABLET | Refills: 2 | Status: SHIPPED | OUTPATIENT
Start: 2023-10-20

## 2023-10-20 RX ORDER — ALENDRONATE SODIUM 70 MG/1
TABLET ORAL
Qty: 12 TABLET | Refills: 3 | Status: SHIPPED | OUTPATIENT
Start: 2023-10-20

## 2023-10-20 RX ORDER — ALLOPURINOL 300 MG/1
300 TABLET ORAL DAILY
Qty: 90 TABLET | Refills: 1 | Status: SHIPPED | OUTPATIENT
Start: 2023-10-20

## 2023-10-20 RX ORDER — VALSARTAN 160 MG/1
160 TABLET ORAL DAILY
Qty: 90 TABLET | Refills: 1 | Status: SHIPPED | OUTPATIENT
Start: 2023-10-20

## 2023-10-20 RX ORDER — FENOFIBRATE 54 MG/1
54 TABLET ORAL DAILY
Qty: 90 TABLET | Refills: 2 | Status: SHIPPED | OUTPATIENT
Start: 2023-10-20

## 2023-10-20 RX ORDER — BUPROPION HYDROCHLORIDE 150 MG/1
150 TABLET ORAL EVERY MORNING
Qty: 90 TABLET | Refills: 1 | Status: SHIPPED | OUTPATIENT
Start: 2023-10-20

## 2023-10-20 RX ORDER — ESCITALOPRAM OXALATE 20 MG/1
20 TABLET ORAL DAILY
Qty: 90 TABLET | Refills: 1 | Status: SHIPPED | OUTPATIENT
Start: 2023-10-20

## 2023-12-05 ENCOUNTER — TELEPHONE (OUTPATIENT)
Age: 68
End: 2023-12-05

## 2024-01-11 LAB — HBA1C MFR BLD HPLC: 10.6 %

## 2024-01-23 ENCOUNTER — OFFICE VISIT (OUTPATIENT)
Age: 69
End: 2024-01-23
Payer: COMMERCIAL

## 2024-01-23 VITALS
RESPIRATION RATE: 16 BRPM | OXYGEN SATURATION: 96 % | DIASTOLIC BLOOD PRESSURE: 64 MMHG | TEMPERATURE: 97.8 F | HEIGHT: 61 IN | SYSTOLIC BLOOD PRESSURE: 138 MMHG | WEIGHT: 202.4 LBS | HEART RATE: 73 BPM | BODY MASS INDEX: 38.21 KG/M2

## 2024-01-23 DIAGNOSIS — E66.01 SEVERE OBESITY (BMI 35.0-39.9) WITH COMORBIDITY (HCC): ICD-10-CM

## 2024-01-23 DIAGNOSIS — E78.5 HYPERLIPIDEMIA WITH TARGET LDL LESS THAN 100: ICD-10-CM

## 2024-01-23 DIAGNOSIS — Z12.31 VISIT FOR SCREENING MAMMOGRAM: ICD-10-CM

## 2024-01-23 DIAGNOSIS — E11.29 TYPE 2 DIABETES MELLITUS WITH MICROALBUMINURIA, WITH LONG-TERM CURRENT USE OF INSULIN (HCC): Primary | ICD-10-CM

## 2024-01-23 DIAGNOSIS — R80.9 TYPE 2 DIABETES MELLITUS WITH MICROALBUMINURIA, WITH LONG-TERM CURRENT USE OF INSULIN (HCC): Primary | ICD-10-CM

## 2024-01-23 DIAGNOSIS — F33.0 MILD EPISODE OF RECURRENT MAJOR DEPRESSIVE DISORDER (HCC): ICD-10-CM

## 2024-01-23 DIAGNOSIS — Z79.4 TYPE 2 DIABETES MELLITUS WITH MICROALBUMINURIA, WITH LONG-TERM CURRENT USE OF INSULIN (HCC): Primary | ICD-10-CM

## 2024-01-23 DIAGNOSIS — I10 ESSENTIAL HYPERTENSION: ICD-10-CM

## 2024-01-23 PROCEDURE — G8484 FLU IMMUNIZE NO ADMIN: HCPCS | Performed by: INTERNAL MEDICINE

## 2024-01-23 PROCEDURE — 3046F HEMOGLOBIN A1C LEVEL >9.0%: CPT | Performed by: INTERNAL MEDICINE

## 2024-01-23 PROCEDURE — 2022F DILAT RTA XM EVC RTNOPTHY: CPT | Performed by: INTERNAL MEDICINE

## 2024-01-23 PROCEDURE — 1090F PRES/ABSN URINE INCON ASSESS: CPT | Performed by: INTERNAL MEDICINE

## 2024-01-23 PROCEDURE — 99214 OFFICE O/P EST MOD 30 MIN: CPT | Performed by: INTERNAL MEDICINE

## 2024-01-23 PROCEDURE — 1036F TOBACCO NON-USER: CPT | Performed by: INTERNAL MEDICINE

## 2024-01-23 PROCEDURE — 3075F SYST BP GE 130 - 139MM HG: CPT | Performed by: INTERNAL MEDICINE

## 2024-01-23 PROCEDURE — G8399 PT W/DXA RESULTS DOCUMENT: HCPCS | Performed by: INTERNAL MEDICINE

## 2024-01-23 PROCEDURE — G8427 DOCREV CUR MEDS BY ELIG CLIN: HCPCS | Performed by: INTERNAL MEDICINE

## 2024-01-23 PROCEDURE — 3078F DIAST BP <80 MM HG: CPT | Performed by: INTERNAL MEDICINE

## 2024-01-23 PROCEDURE — 1123F ACP DISCUSS/DSCN MKR DOCD: CPT | Performed by: INTERNAL MEDICINE

## 2024-01-23 PROCEDURE — G8417 CALC BMI ABV UP PARAM F/U: HCPCS | Performed by: INTERNAL MEDICINE

## 2024-01-23 PROCEDURE — 3017F COLORECTAL CA SCREEN DOC REV: CPT | Performed by: INTERNAL MEDICINE

## 2024-01-23 RX ORDER — ALLOPURINOL 300 MG/1
300 TABLET ORAL DAILY
Qty: 90 TABLET | Refills: 1 | Status: SHIPPED | OUTPATIENT
Start: 2024-01-23

## 2024-01-23 RX ORDER — ESCITALOPRAM OXALATE 20 MG/1
20 TABLET ORAL DAILY
Qty: 90 TABLET | Refills: 1 | Status: SHIPPED | OUTPATIENT
Start: 2024-01-23

## 2024-01-23 RX ORDER — ROSUVASTATIN CALCIUM 10 MG/1
10 TABLET, COATED ORAL DAILY
Qty: 90 TABLET | Refills: 3 | Status: SHIPPED | OUTPATIENT
Start: 2024-01-23

## 2024-01-23 RX ORDER — VALSARTAN 160 MG/1
160 TABLET ORAL DAILY
Qty: 90 TABLET | Refills: 1 | Status: SHIPPED | OUTPATIENT
Start: 2024-01-23

## 2024-01-23 RX ORDER — BUPROPION HYDROCHLORIDE 150 MG/1
150 TABLET ORAL EVERY MORNING
Qty: 90 TABLET | Refills: 1 | Status: SHIPPED | OUTPATIENT
Start: 2024-01-23

## 2024-01-23 RX ORDER — FENOFIBRATE 54 MG/1
54 TABLET ORAL DAILY
Qty: 90 TABLET | Refills: 3 | Status: SHIPPED | OUTPATIENT
Start: 2024-01-23

## 2024-01-23 ASSESSMENT — PATIENT HEALTH QUESTIONNAIRE - PHQ9
8. MOVING OR SPEAKING SO SLOWLY THAT OTHER PEOPLE COULD HAVE NOTICED. OR THE OPPOSITE, BEING SO FIGETY OR RESTLESS THAT YOU HAVE BEEN MOVING AROUND A LOT MORE THAN USUAL: 0
4. FEELING TIRED OR HAVING LITTLE ENERGY: 1
3. TROUBLE FALLING OR STAYING ASLEEP: 1
10. IF YOU CHECKED OFF ANY PROBLEMS, HOW DIFFICULT HAVE THESE PROBLEMS MADE IT FOR YOU TO DO YOUR WORK, TAKE CARE OF THINGS AT HOME, OR GET ALONG WITH OTHER PEOPLE: 0
7. TROUBLE CONCENTRATING ON THINGS, SUCH AS READING THE NEWSPAPER OR WATCHING TELEVISION: 0
2. FEELING DOWN, DEPRESSED OR HOPELESS: 0
SUM OF ALL RESPONSES TO PHQ QUESTIONS 1-9: 5
SUM OF ALL RESPONSES TO PHQ QUESTIONS 1-9: 5
SUM OF ALL RESPONSES TO PHQ9 QUESTIONS 1 & 2: 1
5. POOR APPETITE OR OVEREATING: 0
SUM OF ALL RESPONSES TO PHQ QUESTIONS 1-9: 5
1. LITTLE INTEREST OR PLEASURE IN DOING THINGS: 1
6. FEELING BAD ABOUT YOURSELF - OR THAT YOU ARE A FAILURE OR HAVE LET YOURSELF OR YOUR FAMILY DOWN: 2
SUM OF ALL RESPONSES TO PHQ QUESTIONS 1-9: 5

## 2024-01-23 ASSESSMENT — ENCOUNTER SYMPTOMS
SHORTNESS OF BREATH: 0
ABDOMINAL PAIN: 0

## 2024-01-23 NOTE — PROGRESS NOTES
Shilpa Garcia is a 68 y.o. female who was seen in clinic today (1/23/2024).      Assessment & Plan:   Below is the assessment and plan developed based on review of pertinent history, physical exam, labs, studies, and medications.  1. Type 2 diabetes mellitus with microalbuminuria, with long-term current use of insulin (HCC)  Assessment & Plan:   Not at goal with most recent A1c is improved.  She is continuing to work with her endocrinologist to adjust her insulin and other medications as well as her diabetic diet.  Orders:  -     Microalbumin / Creatinine Urine Ratio; Future  2. Essential hypertension  Assessment & Plan:  She forgot to take medication today.  Blood pressure borderline today.  Continue with current medications.  3. Mild episode of recurrent major depressive disorder (HCC)  Assessment & Plan:  Overall improved, continue current medications  4. Hyperlipidemia with target LDL less than 100  -     fenofibrate (TRICOR) 54 MG tablet; Take 1 tablet by mouth daily [facility] pharmacy: Please dispense generic fenofibrate unless prescriber denote, Disp-90 tablet, R-3Normal  5. Severe obesity (BMI 35.0-39.9) with comorbidity (HCC)  6. Visit for screening mammogram  -     [facility] JOSE DIGITAL SCREEN BILATERAL; Future     Return in about 5 months (around 6/23/2024).   Subjective/Objective:   Shilpa was seen today for Hypertension   follow up active medical problems and medication management.   Patient Active Problem List   Diagnosis    Nephrolithiasis, uric acid    Type 2 diabetes mellitus with microalbuminuria, with long-term current use of insulin (HCC)    Hyperlipidemia with target LDL less than 100    Essential hypertension    Class 1 obesity due to excess calories with serious comorbidity and body mass index (BMI) of 34.0 to 34.9 in adult    Osteopenia of multiple sites    Pulmonary nodule    History of acute pancreatitis    Major depressive disorder, recurrent, unspecified   Reviewed lab work recently done by

## 2024-01-23 NOTE — PATIENT INSTRUCTIONS
A Healthy Lifestyle: Care Instructions  A healthy lifestyle can help you feel good, have more energy, and stay at a weight that's healthy for you. You can share a healthy lifestyle with your friends and family. And you can do it on your own.    Eat meals with your friends or family. You could try cooking together.   Plan activities with other people. Go for a walk with a friend, try a free online fitness class, or join a sports league.     Eat a variety of healthy foods. These include fruits, vegetables, whole grains, low-fat dairy, and lean protein.   Choose healthy portions of food. You can use the Nutrition Facts label on food packages as a guide.     Eat more fruits and vegetables. You could add vegetables to sandwiches or add fruit to cereal.   Drink water when you are thirsty. Limit soda, juice, and sports drinks.     Try to exercise most days. Aim for at least 2½ hours of exercise each week.   Keep moving. Work in the garden or take your dog on a walk. Use the stairs instead of the elevator.     If you use tobacco or nicotine, try to quit. Ask your doctor about programs and medicines to help you quit.   Limit alcohol. Men should have no more than 2 drinks a day. Women should have no more than 1. For some people, no alcohol is the best choice.   Follow-up care is a key part of your treatment and safety. Be sure to make and go to all appointments, and call your doctor if you are having problems. It's also a good idea to know your test results and keep a list of the medicines you take.  Where can you learn more?  Go to https://www.[employer].net/patientEd and enter U807 to learn more about \"A Healthy Lifestyle: Care Instructions.\"  Current as of: November 14, 2022               Content Version: 13.7  © 2597-1220 [employer].   Care instructions adapted under license by [employer]. If you have questions about a medical condition or this instruction, always ask your healthcare professional.

## 2024-01-23 NOTE — ASSESSMENT & PLAN NOTE
Not at goal with most recent A1c is improved.  She is continuing to work with her endocrinologist to adjust her insulin and other medications as well as her diabetic diet.

## 2024-01-23 NOTE — ASSESSMENT & PLAN NOTE
She forgot to take medication today.  Blood pressure borderline today.  Continue with current medications.

## 2024-01-24 LAB
ALBUMIN/CREAT UR: 752 MG/G CREAT (ref 0–29)
CREAT UR-MCNC: 155.6 MG/DL
MICROALBUMIN UR-MCNC: 1170.7 UG/ML

## 2024-02-24 DIAGNOSIS — Z79.4 TYPE 2 DIABETES MELLITUS WITH DIABETIC NEPHROPATHY, WITH LONG-TERM CURRENT USE OF INSULIN (HCC): Primary | ICD-10-CM

## 2024-02-24 DIAGNOSIS — E11.21 TYPE 2 DIABETES MELLITUS WITH DIABETIC NEPHROPATHY, WITH LONG-TERM CURRENT USE OF INSULIN (HCC): Primary | ICD-10-CM

## 2024-03-14 ENCOUNTER — HOSPITAL ENCOUNTER (OUTPATIENT)
Age: 69
Discharge: HOME | End: 2024-03-14
Payer: MEDICARE

## 2024-03-14 VITALS — WEIGHT: 205 LBS | HEIGHT: 64 IN | BODY MASS INDEX: 35 KG/M2

## 2024-03-14 DIAGNOSIS — Z12.31 VISIT FOR SCREENING MAMMOGRAM: ICD-10-CM

## 2024-03-14 PROCEDURE — 77063 BREAST TOMOSYNTHESIS BI: CPT

## 2024-03-21 ENCOUNTER — TELEPHONE (OUTPATIENT)
Age: 69
End: 2024-03-21

## 2024-03-21 NOTE — TELEPHONE ENCOUNTER
JULIETHM for pt re nurse note. Advised pt she can now self [facility] or call the office. Number provided. 03/21/24 DAVIN

## 2024-04-27 LAB — HBA1C MFR BLD HPLC: 8.4 %

## 2024-06-20 ENCOUNTER — OFFICE VISIT (OUTPATIENT)
Age: 69
End: 2024-06-20
Payer: MEDICARE

## 2024-06-20 VITALS
HEIGHT: 64 IN | HEART RATE: 77 BPM | SYSTOLIC BLOOD PRESSURE: 124 MMHG | OXYGEN SATURATION: 98 % | DIASTOLIC BLOOD PRESSURE: 60 MMHG | RESPIRATION RATE: 16 BRPM | BODY MASS INDEX: 34.66 KG/M2 | TEMPERATURE: 97.5 F | WEIGHT: 203 LBS

## 2024-06-20 DIAGNOSIS — E11.29 TYPE 2 DIABETES MELLITUS WITH MICROALBUMINURIA, WITH LONG-TERM CURRENT USE OF INSULIN (HCC): Primary | ICD-10-CM

## 2024-06-20 DIAGNOSIS — E78.5 HYPERLIPIDEMIA WITH TARGET LDL LESS THAN 100: ICD-10-CM

## 2024-06-20 DIAGNOSIS — F33.1 MODERATE EPISODE OF RECURRENT MAJOR DEPRESSIVE DISORDER (HCC): ICD-10-CM

## 2024-06-20 DIAGNOSIS — N20.0 NEPHROLITHIASIS, URIC ACID: ICD-10-CM

## 2024-06-20 DIAGNOSIS — E55.9 VITAMIN D DEFICIENCY: ICD-10-CM

## 2024-06-20 DIAGNOSIS — R91.1 PULMONARY NODULE: ICD-10-CM

## 2024-06-20 DIAGNOSIS — R80.9 TYPE 2 DIABETES MELLITUS WITH MICROALBUMINURIA, WITH LONG-TERM CURRENT USE OF INSULIN (HCC): Primary | ICD-10-CM

## 2024-06-20 DIAGNOSIS — Z79.4 TYPE 2 DIABETES MELLITUS WITH MICROALBUMINURIA, WITH LONG-TERM CURRENT USE OF INSULIN (HCC): Primary | ICD-10-CM

## 2024-06-20 DIAGNOSIS — M85.89 OSTEOPENIA OF MULTIPLE SITES: ICD-10-CM

## 2024-06-20 PROBLEM — F33.9 MAJOR DEPRESSIVE DISORDER, RECURRENT, UNSPECIFIED (HCC): Status: RESOLVED | Noted: 2023-10-05 | Resolved: 2024-06-20

## 2024-06-20 PROCEDURE — 99214 OFFICE O/P EST MOD 30 MIN: CPT | Performed by: INTERNAL MEDICINE

## 2024-06-20 PROCEDURE — 3017F COLORECTAL CA SCREEN DOC REV: CPT | Performed by: INTERNAL MEDICINE

## 2024-06-20 PROCEDURE — G8399 PT W/DXA RESULTS DOCUMENT: HCPCS | Performed by: INTERNAL MEDICINE

## 2024-06-20 PROCEDURE — 3046F HEMOGLOBIN A1C LEVEL >9.0%: CPT | Performed by: INTERNAL MEDICINE

## 2024-06-20 PROCEDURE — 1123F ACP DISCUSS/DSCN MKR DOCD: CPT | Performed by: INTERNAL MEDICINE

## 2024-06-20 PROCEDURE — 1090F PRES/ABSN URINE INCON ASSESS: CPT | Performed by: INTERNAL MEDICINE

## 2024-06-20 PROCEDURE — 1036F TOBACCO NON-USER: CPT | Performed by: INTERNAL MEDICINE

## 2024-06-20 PROCEDURE — G8417 CALC BMI ABV UP PARAM F/U: HCPCS | Performed by: INTERNAL MEDICINE

## 2024-06-20 PROCEDURE — 3074F SYST BP LT 130 MM HG: CPT | Performed by: INTERNAL MEDICINE

## 2024-06-20 PROCEDURE — 3078F DIAST BP <80 MM HG: CPT | Performed by: INTERNAL MEDICINE

## 2024-06-20 PROCEDURE — 2022F DILAT RTA XM EVC RTNOPTHY: CPT | Performed by: INTERNAL MEDICINE

## 2024-06-20 PROCEDURE — G8427 DOCREV CUR MEDS BY ELIG CLIN: HCPCS | Performed by: INTERNAL MEDICINE

## 2024-06-20 RX ORDER — EMPAGLIFLOZIN 25 MG/1
TABLET, FILM COATED ORAL
COMMUNITY
Start: 2024-05-03

## 2024-06-20 SDOH — ECONOMIC STABILITY: HOUSING INSECURITY
IN THE LAST 12 MONTHS, WAS THERE A TIME WHEN YOU DID NOT HAVE A STEADY PLACE TO SLEEP OR SLEPT IN A SHELTER (INCLUDING NOW)?: NO

## 2024-06-20 SDOH — ECONOMIC STABILITY: FOOD INSECURITY: WITHIN THE PAST 12 MONTHS, THE FOOD YOU BOUGHT JUST DIDN'T LAST AND YOU DIDN'T HAVE MONEY TO GET MORE.: NEVER TRUE

## 2024-06-20 SDOH — ECONOMIC STABILITY: FOOD INSECURITY: WITHIN THE PAST 12 MONTHS, YOU WORRIED THAT YOUR FOOD WOULD RUN OUT BEFORE YOU GOT MONEY TO BUY MORE.: NEVER TRUE

## 2024-06-20 SDOH — ECONOMIC STABILITY: TRANSPORTATION INSECURITY
IN THE PAST 12 MONTHS, HAS LACK OF TRANSPORTATION KEPT YOU FROM MEETINGS, WORK, OR FROM GETTING THINGS NEEDED FOR DAILY LIVING?: NO

## 2024-06-20 SDOH — ECONOMIC STABILITY: INCOME INSECURITY: HOW HARD IS IT FOR YOU TO PAY FOR THE VERY BASICS LIKE FOOD, HOUSING, MEDICAL CARE, AND HEATING?: NOT HARD AT ALL

## 2024-06-20 ASSESSMENT — PATIENT HEALTH QUESTIONNAIRE - PHQ9
9. THOUGHTS THAT YOU WOULD BE BETTER OFF DEAD, OR OF HURTING YOURSELF: NOT AT ALL
7. TROUBLE CONCENTRATING ON THINGS, SUCH AS READING THE NEWSPAPER OR WATCHING TELEVISION: NOT AT ALL
8. MOVING OR SPEAKING SO SLOWLY THAT OTHER PEOPLE COULD HAVE NOTICED. OR THE OPPOSITE, BEING SO FIGETY OR RESTLESS THAT YOU HAVE BEEN MOVING AROUND A LOT MORE THAN USUAL: NOT AT ALL
2. FEELING DOWN, DEPRESSED OR HOPELESS: SEVERAL DAYS
SUM OF ALL RESPONSES TO PHQ QUESTIONS 1-9: 12
SUM OF ALL RESPONSES TO PHQ QUESTIONS 1-9: 12
1. LITTLE INTEREST OR PLEASURE IN DOING THINGS: MORE THAN HALF THE DAYS
SUM OF ALL RESPONSES TO PHQ QUESTIONS 1-9: 12
SUM OF ALL RESPONSES TO PHQ QUESTIONS 1-9: 12
5. POOR APPETITE OR OVEREATING: NEARLY EVERY DAY
4. FEELING TIRED OR HAVING LITTLE ENERGY: NEARLY EVERY DAY
6. FEELING BAD ABOUT YOURSELF - OR THAT YOU ARE A FAILURE OR HAVE LET YOURSELF OR YOUR FAMILY DOWN: NEARLY EVERY DAY
10. IF YOU CHECKED OFF ANY PROBLEMS, HOW DIFFICULT HAVE THESE PROBLEMS MADE IT FOR YOU TO DO YOUR WORK, TAKE CARE OF THINGS AT HOME, OR GET ALONG WITH OTHER PEOPLE: NOT DIFFICULT AT ALL
SUM OF ALL RESPONSES TO PHQ9 QUESTIONS 1 & 2: 3
3. TROUBLE FALLING OR STAYING ASLEEP: NOT AT ALL

## 2024-06-20 ASSESSMENT — ENCOUNTER SYMPTOMS
ABDOMINAL PAIN: 0
SHORTNESS OF BREATH: 0

## 2024-06-20 NOTE — ASSESSMENT & PLAN NOTE
High FRAX and started on fosamax 2021 and tolerating well. Reviewed last bone density. Ca vit D3 and weight bearing exercises.

## 2024-06-20 NOTE — ASSESSMENT & PLAN NOTE
Monitored by specialist- no acute findings meriting change in the plan   Nodule have been stable.

## 2024-06-20 NOTE — ASSESSMENT & PLAN NOTE
Unclear control, continue current medications pending work up below, medication adherence emphasized, and lifestyle modifications recommended

## 2024-06-20 NOTE — PATIENT INSTRUCTIONS

## 2024-06-20 NOTE — PROGRESS NOTES
WATER REMAIN UPRIGHT & NOTHING BY MOUTH X30 MINS AFTER . 12 tablet 3    vitamin D (ERGOCALCIFEROL) 1.25 MG (43417 UT) CAPS capsule TAKE 1 CAPSULE BY MOUTH EVERY 7 DAYS 12 capsule 1    omeprazole (PRILOSEC) 20 MG delayed release capsule TAKE 1 CAPSULE BY MOUTH EVERY DAY 90 capsule 3    nystatin (MYCOSTATIN) 941456 UNIT/GM powder APPLY TO AFFECTED AREA TWO (2) TIMES A DAY AS NEEDED 60 g 1    Probiotic Product (PROBIOTIC DAILY PO) Probiotic      aspirin 81 MG EC tablet aspirin 81 mg tablet,delayed release      ibuprofen (ADVIL;MOTRIN) 200 MG CAPS capsule Advil      Continuous Blood Gluc Sensor (FREESTYLE ELY 2 SENSOR) MISC APPLY SENSOR TO ARM TO CHECK BLOOD SUGAR AND CHANGE EVERY 14 DAYS AS DIRECTED      acetaminophen (TYLENOL) 500 MG tablet Take by mouth every 6 hours as needed      clobetasol (TEMOVATE) 0.05 % cream 2 times daily as needed      glimepiride (AMARYL) 4 MG tablet Take 1 tablet by mouth 2 times daily      Insulin Degludec (TRESIBA FLEXTOUCH) 200 UNIT/ML SOPN 80 Units daily      insulin lispro, 1 Unit Dial, (HUMALOG/ADMELOG) 100 UNIT/ML SOPN INJECT 5 UNITS PER 15 GRAMS OF CARBS UP  UNITS PER DAY SUBCUTANEOUSLY.  Add 5 units to carb ratio at dinner      metFORMIN (GLUCOPHAGE-XR) 500 MG extended release tablet Take 2 tablets by mouth 2 times daily      fluticasone-salmeterol (ADVAIR) 100-50 MCG/ACT AEPB diskus inhaler        No current facility-administered medications for this visit.      Review of Systems   Respiratory:  Negative for shortness of breath.    Cardiovascular:  Negative for chest pain and leg swelling.   Gastrointestinal:  Negative for abdominal pain.          Physical Exam  Vitals and nursing note reviewed.   Constitutional:       General: She is not in acute distress.  Cardiovascular:      Rate and Rhythm: Normal rate and regular rhythm.   Pulmonary:      Effort: Pulmonary effort is normal.      Breath sounds: Normal breath sounds.   Abdominal:      General: Bowel sounds are normal.  Yes

## 2024-06-20 NOTE — ASSESSMENT & PLAN NOTE
Check level and if at goal plan to stop ergocalciferol and switch to a daily vitamin D3 supplement

## 2024-06-20 NOTE — ASSESSMENT & PLAN NOTE
At goal, continue current medications reviewed April 2024 lab work from her endocrinologist.  Will scanned to chart.

## 2024-06-21 LAB
25(OH)D3+25(OH)D2 SERPL-MCNC: 48 NG/ML (ref 30–100)
BASOPHILS # BLD AUTO: 0.1 X10E3/UL (ref 0–0.2)
BASOPHILS NFR BLD AUTO: 1 %
EOSINOPHIL # BLD AUTO: 0.2 X10E3/UL (ref 0–0.4)
EOSINOPHIL NFR BLD AUTO: 3 %
ERYTHROCYTE [DISTWIDTH] IN BLOOD BY AUTOMATED COUNT: 15.1 % (ref 11.7–15.4)
HCT VFR BLD AUTO: 38 % (ref 34–46.6)
HGB BLD-MCNC: 12 G/DL (ref 11.1–15.9)
IMM GRANULOCYTES # BLD AUTO: 0 X10E3/UL (ref 0–0.1)
IMM GRANULOCYTES NFR BLD AUTO: 0 %
LYMPHOCYTES # BLD AUTO: 2.1 X10E3/UL (ref 0.7–3.1)
LYMPHOCYTES NFR BLD AUTO: 25 %
MCH RBC QN AUTO: 27 PG (ref 26.6–33)
MCHC RBC AUTO-ENTMCNC: 31.6 G/DL (ref 31.5–35.7)
MCV RBC AUTO: 86 FL (ref 79–97)
MONOCYTES # BLD AUTO: 0.4 X10E3/UL (ref 0.1–0.9)
MONOCYTES NFR BLD AUTO: 4 %
NEUTROPHILS # BLD AUTO: 5.8 X10E3/UL (ref 1.4–7)
NEUTROPHILS NFR BLD AUTO: 67 %
PLATELET # BLD AUTO: 288 X10E3/UL (ref 150–450)
RBC # BLD AUTO: 4.44 X10E6/UL (ref 3.77–5.28)
URATE SERPL-MCNC: 4.2 MG/DL (ref 3–7.2)
WBC # BLD AUTO: 8.6 X10E3/UL (ref 3.4–10.8)

## 2024-06-23 RX ORDER — BUPROPION HYDROCHLORIDE 150 MG/1
150 TABLET ORAL EVERY MORNING
Qty: 90 TABLET | Refills: 1 | Status: SHIPPED | OUTPATIENT
Start: 2024-06-23

## 2024-06-23 RX ORDER — VALSARTAN 160 MG/1
160 TABLET ORAL DAILY
Qty: 90 TABLET | Refills: 1 | Status: SHIPPED | OUTPATIENT
Start: 2024-06-23

## 2024-06-23 RX ORDER — ESCITALOPRAM OXALATE 20 MG/1
20 TABLET ORAL DAILY
Qty: 90 TABLET | Refills: 1 | Status: SHIPPED | OUTPATIENT
Start: 2024-06-23

## 2024-06-25 DIAGNOSIS — E55.9 VITAMIN D DEFICIENCY, UNSPECIFIED: ICD-10-CM

## 2024-06-25 NOTE — TELEPHONE ENCOUNTER
Chief Complaint   Patient presents with    Medication Refill     Last Appointment with Dr. Poole:  6/20/2024   No future appointments.

## 2024-06-27 RX ORDER — ERGOCALCIFEROL 1.25 MG/1
50000 CAPSULE ORAL WEEKLY
Qty: 12 CAPSULE | Refills: 1 | OUTPATIENT
Start: 2024-06-27

## 2024-06-27 NOTE — TELEPHONE ENCOUNTER
Notify pt vitamin D level was very good and will stop weekly ergocalciferol and start daily vitamin D3 2000 iu daily otc to maintain vit D level

## 2024-08-08 ENCOUNTER — TELEPHONE (OUTPATIENT)
Age: 69
End: 2024-08-08

## 2024-10-03 LAB
HBA1C MFR BLD HPLC: 9.1 %
MICROALBUMIN/CREATININE RATIO, EXTERNAL: 111

## 2024-10-09 ENCOUNTER — OFFICE VISIT (OUTPATIENT)
Age: 69
End: 2024-10-09
Payer: MEDICARE

## 2024-10-09 VITALS
SYSTOLIC BLOOD PRESSURE: 118 MMHG | BODY MASS INDEX: 34.76 KG/M2 | DIASTOLIC BLOOD PRESSURE: 60 MMHG | HEART RATE: 84 BPM | RESPIRATION RATE: 16 BRPM | HEIGHT: 64 IN | OXYGEN SATURATION: 98 % | WEIGHT: 203.6 LBS | TEMPERATURE: 97.8 F

## 2024-10-09 DIAGNOSIS — Z12.31 VISIT FOR SCREENING MAMMOGRAM: ICD-10-CM

## 2024-10-09 DIAGNOSIS — Z00.00 MEDICARE ANNUAL WELLNESS VISIT, SUBSEQUENT: Primary | ICD-10-CM

## 2024-10-09 DIAGNOSIS — I10 ESSENTIAL HYPERTENSION: ICD-10-CM

## 2024-10-09 DIAGNOSIS — F33.1 MODERATE EPISODE OF RECURRENT MAJOR DEPRESSIVE DISORDER (HCC): ICD-10-CM

## 2024-10-09 DIAGNOSIS — Z79.4 TYPE 2 DIABETES MELLITUS WITH STAGE 3A CHRONIC KIDNEY DISEASE, WITH LONG-TERM CURRENT USE OF INSULIN (HCC): ICD-10-CM

## 2024-10-09 DIAGNOSIS — Z79.4 TYPE 2 DIABETES MELLITUS WITH MICROALBUMINURIA, WITH LONG-TERM CURRENT USE OF INSULIN (HCC): ICD-10-CM

## 2024-10-09 DIAGNOSIS — N18.31 TYPE 2 DIABETES MELLITUS WITH STAGE 3A CHRONIC KIDNEY DISEASE, WITH LONG-TERM CURRENT USE OF INSULIN (HCC): ICD-10-CM

## 2024-10-09 DIAGNOSIS — E11.22 TYPE 2 DIABETES MELLITUS WITH STAGE 3A CHRONIC KIDNEY DISEASE, WITH LONG-TERM CURRENT USE OF INSULIN (HCC): ICD-10-CM

## 2024-10-09 DIAGNOSIS — E11.29 TYPE 2 DIABETES MELLITUS WITH MICROALBUMINURIA, WITH LONG-TERM CURRENT USE OF INSULIN (HCC): ICD-10-CM

## 2024-10-09 DIAGNOSIS — Z78.0 POSTMENOPAUSAL: ICD-10-CM

## 2024-10-09 DIAGNOSIS — R80.9 TYPE 2 DIABETES MELLITUS WITH MICROALBUMINURIA, WITH LONG-TERM CURRENT USE OF INSULIN (HCC): ICD-10-CM

## 2024-10-09 DIAGNOSIS — R29.898 WEAKNESS OF BOTH LOWER EXTREMITIES: ICD-10-CM

## 2024-10-09 PROCEDURE — 3078F DIAST BP <80 MM HG: CPT | Performed by: INTERNAL MEDICINE

## 2024-10-09 PROCEDURE — 2022F DILAT RTA XM EVC RTNOPTHY: CPT | Performed by: INTERNAL MEDICINE

## 2024-10-09 PROCEDURE — 1036F TOBACCO NON-USER: CPT | Performed by: INTERNAL MEDICINE

## 2024-10-09 PROCEDURE — 99214 OFFICE O/P EST MOD 30 MIN: CPT | Performed by: INTERNAL MEDICINE

## 2024-10-09 PROCEDURE — G8427 DOCREV CUR MEDS BY ELIG CLIN: HCPCS | Performed by: INTERNAL MEDICINE

## 2024-10-09 PROCEDURE — 3017F COLORECTAL CA SCREEN DOC REV: CPT | Performed by: INTERNAL MEDICINE

## 2024-10-09 PROCEDURE — G8484 FLU IMMUNIZE NO ADMIN: HCPCS | Performed by: INTERNAL MEDICINE

## 2024-10-09 PROCEDURE — 1090F PRES/ABSN URINE INCON ASSESS: CPT | Performed by: INTERNAL MEDICINE

## 2024-10-09 PROCEDURE — 1123F ACP DISCUSS/DSCN MKR DOCD: CPT | Performed by: INTERNAL MEDICINE

## 2024-10-09 PROCEDURE — G8417 CALC BMI ABV UP PARAM F/U: HCPCS | Performed by: INTERNAL MEDICINE

## 2024-10-09 PROCEDURE — 3046F HEMOGLOBIN A1C LEVEL >9.0%: CPT | Performed by: INTERNAL MEDICINE

## 2024-10-09 PROCEDURE — G8399 PT W/DXA RESULTS DOCUMENT: HCPCS | Performed by: INTERNAL MEDICINE

## 2024-10-09 PROCEDURE — G0439 PPPS, SUBSEQ VISIT: HCPCS | Performed by: INTERNAL MEDICINE

## 2024-10-09 PROCEDURE — 3074F SYST BP LT 130 MM HG: CPT | Performed by: INTERNAL MEDICINE

## 2024-10-09 RX ORDER — ROSUVASTATIN CALCIUM 10 MG/1
10 TABLET, COATED ORAL DAILY
Qty: 90 TABLET | Refills: 3 | Status: SHIPPED | OUTPATIENT
Start: 2024-10-09

## 2024-10-09 RX ORDER — HYDROCHLOROTHIAZIDE 25 MG/1
1 TABLET ORAL
COMMUNITY

## 2024-10-09 ASSESSMENT — PATIENT HEALTH QUESTIONNAIRE - PHQ9
1. LITTLE INTEREST OR PLEASURE IN DOING THINGS: NEARLY EVERY DAY
10. IF YOU CHECKED OFF ANY PROBLEMS, HOW DIFFICULT HAVE THESE PROBLEMS MADE IT FOR YOU TO DO YOUR WORK, TAKE CARE OF THINGS AT HOME, OR GET ALONG WITH OTHER PEOPLE: VERY DIFFICULT
SUM OF ALL RESPONSES TO PHQ9 QUESTIONS 1 & 2: 5
SUM OF ALL RESPONSES TO PHQ QUESTIONS 1-9: 13
SUM OF ALL RESPONSES TO PHQ QUESTIONS 1-9: 13
7. TROUBLE CONCENTRATING ON THINGS, SUCH AS READING THE NEWSPAPER OR WATCHING TELEVISION: SEVERAL DAYS
9. THOUGHTS THAT YOU WOULD BE BETTER OFF DEAD, OR OF HURTING YOURSELF: NOT AT ALL
5. POOR APPETITE OR OVEREATING: NOT AT ALL
SUM OF ALL RESPONSES TO PHQ QUESTIONS 1-9: 13
3. TROUBLE FALLING OR STAYING ASLEEP: NEARLY EVERY DAY
SUM OF ALL RESPONSES TO PHQ QUESTIONS 1-9: 13
6. FEELING BAD ABOUT YOURSELF - OR THAT YOU ARE A FAILURE OR HAVE LET YOURSELF OR YOUR FAMILY DOWN: NOT AT ALL
2. FEELING DOWN, DEPRESSED OR HOPELESS: MORE THAN HALF THE DAYS
8. MOVING OR SPEAKING SO SLOWLY THAT OTHER PEOPLE COULD HAVE NOTICED. OR THE OPPOSITE, BEING SO FIGETY OR RESTLESS THAT YOU HAVE BEEN MOVING AROUND A LOT MORE THAN USUAL: SEVERAL DAYS
4. FEELING TIRED OR HAVING LITTLE ENERGY: NEARLY EVERY DAY

## 2024-10-09 ASSESSMENT — ENCOUNTER SYMPTOMS
BLOOD IN STOOL: 0
ABDOMINAL PAIN: 0
COUGH: 0
SHORTNESS OF BREATH: 0
CONSTIPATION: 0
SORE THROAT: 0
NAUSEA: 0
DIARRHEA: 0
BACK PAIN: 0

## 2024-10-09 ASSESSMENT — LIFESTYLE VARIABLES
HOW OFTEN DO YOU HAVE A DRINK CONTAINING ALCOHOL: NEVER
HOW MANY STANDARD DRINKS CONTAINING ALCOHOL DO YOU HAVE ON A TYPICAL DAY: PATIENT DOES NOT DRINK

## 2024-10-09 NOTE — ASSESSMENT & PLAN NOTE
Chronic, not at goal (unstable), much of her symptoms are being driven by acute financial and family stressors.  She was affective if I financial scam which is continuing to have negative impact on her mood.  Continue with current medications Lexapro and Wellbutrin and I think she would really benefit from counseling and we will refer her to Magaly Gaspar.  She is agreeable to this plan.

## 2024-10-09 NOTE — ASSESSMENT & PLAN NOTE
Not at goal and monitored by her endocrinologist.  Reviewed her last A1c with her.  The majority of her poor control is related to missing medications and not following diabetic diet.  We reviewed importance of medication and dietary compliance.

## 2024-10-09 NOTE — PROGRESS NOTES
Medicare Annual Wellness Visit    Shilpa Garcia is here for Medicare AWV    Assessment & Plan   Medicare annual wellness visit, subsequent  Health maintenance reviewed and updated with patient today at visit.  Reviewed recommendations for vaccines.  Essential hypertension  Assessment & Plan:   Chronic, at goal (stable), continue current treatment plan  Moderate episode of recurrent major depressive disorder (HCC)  Assessment & Plan:   Chronic, not at goal (unstable), much of her symptoms are being driven by acute financial and family stressors.  She was affective if I financial scam which is continuing to have negative impact on her mood.  Continue with current medications Lexapro and Wellbutrin and I think she would really benefit from counseling and we will refer her to Magaly Gaspar.  She is agreeable to this plan.  Type 2 diabetes mellitus with microalbuminuria, with long-term current use of insulin (HCC)  Assessment & Plan:   Not at goal and monitored by her endocrinologist.  Reviewed her last A1c with her.  The majority of her poor control is related to missing medications and not following diabetic diet.  We reviewed importance of medication and dietary compliance.  Recent [facility] lab work is reviewed from her endocrinologist today and will scan to chart.  Postmenopausal  -     DEXA BONE DENSITY AXIAL SKELETON; Future  Visit for screening mammogram  -     [facility] JOSE DIGITAL SCREEN BILATERAL; Future  Weakness of both lower extremities  Comments:  Referral for physical therapy.  Orders:  -     [facility]  Type 2 diabetes mellitus with stage 3a chronic kidney disease, with long-term current use of insulin (HCC)  Comments:  Counseled importance of hydration and avoidance of NSAIDs given her CKD.  Possible episode of slurred speech noted by friend  If she has any further episodes where her speech is slurred or she is having acute worsening in her balance I have asked her to

## 2024-11-11 ENCOUNTER — OFFICE VISIT (OUTPATIENT)
Age: 69
End: 2024-11-11
Payer: COMMERCIAL

## 2024-11-11 DIAGNOSIS — F33.1 MODERATE EPISODE OF RECURRENT MAJOR DEPRESSIVE DISORDER (HCC): Primary | ICD-10-CM

## 2024-11-11 PROCEDURE — 90791 PSYCH DIAGNOSTIC EVALUATION: CPT | Performed by: SOCIAL WORKER

## 2024-11-11 PROCEDURE — 1036F TOBACCO NON-USER: CPT | Performed by: SOCIAL WORKER

## 2024-11-11 PROCEDURE — 1123F ACP DISCUSS/DSCN MKR DOCD: CPT | Performed by: SOCIAL WORKER

## 2024-11-11 NOTE — PROGRESS NOTES
In Office-Initial Evaluation Billing    Session Time     Start Time:    1:00 pm  Finish Time:  1:58 pm    Total time spent for this encounter:  58 minutes    We did set a follow-up appointment with this clinician.      Return in about 4 weeks (around 12/9/2024).     Therapy Modalities   Building Insight, Cognitive Behavioral, and Client Empowerment    Assessment / Plan     Shilpa Garcia is a 69 y.o. female who is alert and oriented X3.  She does not have any suicidal or homicidal ideations.  Patient is not psychotic or delusional.   She has not been psychiatrically admitted to a hospital. Ms. Garcia does have good eye contact during this interview. She is not verbal and engaging.  Patient does have good insight and judgement.  She is  a good candidate for short term therapy to address the above issues.      Psychotherapy Target Symptoms:  decreased ADLs , depressed mood, feelings of helplessness /  hopefulness, increased sleeping, poor sleep, poor self care, and initial assessment    Assessment:   initial     Plan:  continue psychotherapy    1. Moderate episode of recurrent major depressive disorder (HCC)       --Magaly Gaspar LCSW on 11/11/2024 at 3:54 PM    An electronic signature was used to authenticate this note.

## 2024-11-11 NOTE — PSYCHOTHERAPY
In Office-Initial Evaluation    Time Start:1:00 pm  Time End:1:58 pm    DX:    Diagnosis Orders   1. Moderate episode of recurrent major depressive disorder (HCC)                 Met with Ms. Garcia 2024 for our first appointment.  This patient comes in after speaking with her PCP re: her increased depression.  She states she was in counseling about 30 years ago due to an eating disorder (binge/not purging).  She has had some major stressors this year and states that she feels like \"everything has gone wrong this year.\"  She has diabetes that she is not managing well (sometimes takes her medication, other times doesn't), no energy, no motivation, wanting to sleep all the time.  She states that her house in in complete chaos in that she has boxes and files \"all over the house\" and doesn't feel in control of things. Other medical issues include back problems that result in the patient having to sit down after standing for a brief period, her vision in her right eye is very poor after laser surgery as well as has recently fallen and worries that her health is really deteriorating.    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  She states that she was extremely organized and could find any file, palak, policy, etc. At her organized office space.  Now with her house being so disorganized, she wonders how she got this way.  Ms. Garcia attributes her increase in depression and changes in her life occurred when her parents passed away.  She was very close to both her parents as well as her brother.  Her father  in  from cancer, her mother passed in  from complications of COPD and her brother  in 2018 from cancer.  She reports that she had a \"wonderful childhood and misses her family daily.\"  Although she is  (for the second time) and loves her supportive  (of 30 years), she feels like the holidays and her family traditions have been lost.  She is

## 2024-12-05 RX ORDER — ALENDRONATE SODIUM 70 MG/1
TABLET ORAL
Qty: 12 TABLET | Refills: 3 | Status: SHIPPED | OUTPATIENT
Start: 2024-12-05

## 2024-12-05 NOTE — TELEPHONE ENCOUNTER
Left message for a return call. Patient have orders for mammogram and bone density. Need to schedule. Provide patient with central scheduling when call back. Thanks

## 2024-12-09 ENCOUNTER — OFFICE VISIT (OUTPATIENT)
Age: 69
End: 2024-12-09
Payer: COMMERCIAL

## 2024-12-09 DIAGNOSIS — F33.1 MODERATE EPISODE OF RECURRENT MAJOR DEPRESSIVE DISORDER (HCC): Primary | ICD-10-CM

## 2024-12-09 PROCEDURE — 1123F ACP DISCUSS/DSCN MKR DOCD: CPT | Performed by: SOCIAL WORKER

## 2024-12-09 PROCEDURE — 90837 PSYTX W PT 60 MINUTES: CPT | Performed by: SOCIAL WORKER

## 2024-12-09 PROCEDURE — 1036F TOBACCO NON-USER: CPT | Performed by: SOCIAL WORKER

## 2024-12-09 NOTE — PROGRESS NOTES
In office Follow Up Billing    Session Time     Start Time:    1:00 pm  Finish Time:  1:56 pm    Total time spent for this encounter:  56 minutes    We did set a follow-up appointment with this clinician.      Return in about 5 weeks (around 1/13/2025).     Therapy Modalities   Client Empowerment, Homework / Exercises, and Stress Management    Assessment / Plan     Psychotherapy Target Symptoms:  change in daily functioning , depressed mood, increased sleeping, and lack of motivation/energy    Assessment:   minimal improvement -this patient reports she has thought of our treatment plan since our last appt.  She knows the things she needs to get done but admits to \"rolling over and going back to sleep.\"  She knows she is oversleeping as an excuse to avoid cleaning up her house but doesn't have the willpower to change it.  The patient did state that she cleaned and reorganized her bathroom and showed pictures of how that looked.  Her next project is her linen closet as it is disorganized and she stated she hasn't cleaned it out since before COVID.  The improvement for this patient is that she is more insightful into what she needs to do and is pushing herself a bit more to be more productive.  She will return in Jan.      Plan:  continue psychotherapy    1. Moderate episode of recurrent major depressive disorder (HCC)       --Magaly Gaspar LCSW on 12/9/2024 at 1:57 PM    An electronic signature was used to authenticate this note.

## 2024-12-09 NOTE — PSYCHOTHERAPY
In office -Follow Up    Time Start:1:00 pm  Time End:1:56 pm    DX:    Diagnosis Orders   1. Moderate episode of recurrent major depressive disorder (HCC)             Met with Ms. Garcia today for our follow up appt. minimal improvement-this patient reports she has thought of our treatment plan since our last appt.  She knows the things she needs to get done but admits to \"rolling over and going back to sleep.\"  She knows she is oversleeping as an excuse to avoid cleaning up her house but doesn't have the willpower to change it.  The patient did state that she cleaned and reorganized her bathroom and showed pictures of how that looked.  Her next project is her linen closet as it is disorganized and she stated she hasn't cleaned it out since before COVID.  The improvement for this patient is that she is more insightful into what she needs to do and is pushing herself a bit more to be more productive.  She will return in Jan.      We did set a follow-up appointment with this clinician.      Follow-up appt date (if applicable) is:  January 15 @ 11 in office    Magaly Gaspar LCSW

## 2025-01-15 LAB — HBA1C MFR BLD HPLC: 11.4 %

## 2025-01-21 LAB — HBA1C MFR BLD HPLC: 11.4 %

## 2025-03-17 NOTE — TELEPHONE ENCOUNTER
Call pt to schedule telemedicine visit for follow up. [FreeTextEntry1] : Today in the office in sterile conditions I injected the right and left knee lateral approach with 6 mL Synvisc 1.  Patient tolerated the procedures well.  Puncture sites covered with Band-Aid.  Icing if she has any soreness.  She has a follow-up with Dr. Hung in April.

## 2025-03-28 DIAGNOSIS — E78.5 HYPERLIPIDEMIA WITH TARGET LDL LESS THAN 100: ICD-10-CM

## 2025-03-31 RX ORDER — FENOFIBRATE 54 MG/1
54 TABLET ORAL DAILY
Qty: 90 TABLET | Refills: 1 | Status: SHIPPED | OUTPATIENT
Start: 2025-03-31

## 2025-04-28 RX ORDER — ALLOPURINOL 300 MG/1
300 TABLET ORAL DAILY
Qty: 90 TABLET | Refills: 1 | Status: SHIPPED | OUTPATIENT
Start: 2025-04-28

## 2025-05-13 RX ORDER — OMEPRAZOLE 20 MG/1
CAPSULE, DELAYED RELEASE ORAL DAILY
Qty: 90 CAPSULE | Refills: 0 | Status: SHIPPED | OUTPATIENT
Start: 2025-05-13

## 2025-06-04 LAB — HBA1C MFR BLD HPLC: 11.9 %

## 2025-07-07 RX ORDER — BUPROPION HYDROCHLORIDE 150 MG/1
150 TABLET ORAL EVERY MORNING
Qty: 90 TABLET | Refills: 1 | Status: SHIPPED | OUTPATIENT
Start: 2025-07-07

## 2025-07-09 ENCOUNTER — OFFICE VISIT (OUTPATIENT)
Age: 70
End: 2025-07-09
Payer: COMMERCIAL

## 2025-07-09 VITALS
TEMPERATURE: 97.5 F | RESPIRATION RATE: 16 BRPM | WEIGHT: 199.8 LBS | OXYGEN SATURATION: 97 % | BODY MASS INDEX: 34.11 KG/M2 | DIASTOLIC BLOOD PRESSURE: 74 MMHG | HEIGHT: 64 IN | SYSTOLIC BLOOD PRESSURE: 138 MMHG | HEART RATE: 78 BPM

## 2025-07-09 DIAGNOSIS — E11.22 TYPE 2 DIABETES MELLITUS WITH STAGE 3A CHRONIC KIDNEY DISEASE, WITH LONG-TERM CURRENT USE OF INSULIN (HCC): ICD-10-CM

## 2025-07-09 DIAGNOSIS — F33.1 MODERATE EPISODE OF RECURRENT MAJOR DEPRESSIVE DISORDER (HCC): ICD-10-CM

## 2025-07-09 DIAGNOSIS — L30.9 DERMATITIS: ICD-10-CM

## 2025-07-09 DIAGNOSIS — Z78.0 POSTMENOPAUSAL: ICD-10-CM

## 2025-07-09 DIAGNOSIS — N18.31 TYPE 2 DIABETES MELLITUS WITH STAGE 3A CHRONIC KIDNEY DISEASE, WITH LONG-TERM CURRENT USE OF INSULIN (HCC): ICD-10-CM

## 2025-07-09 DIAGNOSIS — R91.1 PULMONARY NODULE: ICD-10-CM

## 2025-07-09 DIAGNOSIS — N20.0 NEPHROLITHIASIS, URIC ACID: ICD-10-CM

## 2025-07-09 DIAGNOSIS — M85.89 OSTEOPENIA OF MULTIPLE SITES: Primary | ICD-10-CM

## 2025-07-09 DIAGNOSIS — Z79.4 TYPE 2 DIABETES MELLITUS WITH STAGE 3A CHRONIC KIDNEY DISEASE, WITH LONG-TERM CURRENT USE OF INSULIN (HCC): ICD-10-CM

## 2025-07-09 DIAGNOSIS — I10 ESSENTIAL HYPERTENSION: ICD-10-CM

## 2025-07-09 DIAGNOSIS — Z12.31 VISIT FOR SCREENING MAMMOGRAM: ICD-10-CM

## 2025-07-09 DIAGNOSIS — E78.5 HYPERLIPIDEMIA WITH TARGET LDL LESS THAN 100: ICD-10-CM

## 2025-07-09 PROCEDURE — G8399 PT W/DXA RESULTS DOCUMENT: HCPCS | Performed by: INTERNAL MEDICINE

## 2025-07-09 PROCEDURE — 1036F TOBACCO NON-USER: CPT | Performed by: INTERNAL MEDICINE

## 2025-07-09 PROCEDURE — 1123F ACP DISCUSS/DSCN MKR DOCD: CPT | Performed by: INTERNAL MEDICINE

## 2025-07-09 PROCEDURE — 2022F DILAT RTA XM EVC RTNOPTHY: CPT | Performed by: INTERNAL MEDICINE

## 2025-07-09 PROCEDURE — 3017F COLORECTAL CA SCREEN DOC REV: CPT | Performed by: INTERNAL MEDICINE

## 2025-07-09 PROCEDURE — 3075F SYST BP GE 130 - 139MM HG: CPT | Performed by: INTERNAL MEDICINE

## 2025-07-09 PROCEDURE — G8417 CALC BMI ABV UP PARAM F/U: HCPCS | Performed by: INTERNAL MEDICINE

## 2025-07-09 PROCEDURE — 1090F PRES/ABSN URINE INCON ASSESS: CPT | Performed by: INTERNAL MEDICINE

## 2025-07-09 PROCEDURE — G8427 DOCREV CUR MEDS BY ELIG CLIN: HCPCS | Performed by: INTERNAL MEDICINE

## 2025-07-09 PROCEDURE — 99214 OFFICE O/P EST MOD 30 MIN: CPT | Performed by: INTERNAL MEDICINE

## 2025-07-09 PROCEDURE — 3078F DIAST BP <80 MM HG: CPT | Performed by: INTERNAL MEDICINE

## 2025-07-09 PROCEDURE — 3046F HEMOGLOBIN A1C LEVEL >9.0%: CPT | Performed by: INTERNAL MEDICINE

## 2025-07-09 RX ORDER — VALSARTAN 320 MG/1
320 TABLET ORAL DAILY
COMMUNITY

## 2025-07-09 SDOH — ECONOMIC STABILITY: FOOD INSECURITY: WITHIN THE PAST 12 MONTHS, THE FOOD YOU BOUGHT JUST DIDN'T LAST AND YOU DIDN'T HAVE MONEY TO GET MORE.: NEVER TRUE

## 2025-07-09 SDOH — ECONOMIC STABILITY: FOOD INSECURITY: WITHIN THE PAST 12 MONTHS, YOU WORRIED THAT YOUR FOOD WOULD RUN OUT BEFORE YOU GOT MONEY TO BUY MORE.: NEVER TRUE

## 2025-07-09 ASSESSMENT — PATIENT HEALTH QUESTIONNAIRE - PHQ9
4. FEELING TIRED OR HAVING LITTLE ENERGY: NEARLY EVERY DAY
8. MOVING OR SPEAKING SO SLOWLY THAT OTHER PEOPLE COULD HAVE NOTICED. OR THE OPPOSITE, BEING SO FIGETY OR RESTLESS THAT YOU HAVE BEEN MOVING AROUND A LOT MORE THAN USUAL: MORE THAN HALF THE DAYS
9. THOUGHTS THAT YOU WOULD BE BETTER OFF DEAD, OR OF HURTING YOURSELF: NOT AT ALL
1. LITTLE INTEREST OR PLEASURE IN DOING THINGS: NEARLY EVERY DAY
3. TROUBLE FALLING OR STAYING ASLEEP: MORE THAN HALF THE DAYS
7. TROUBLE CONCENTRATING ON THINGS, SUCH AS READING THE NEWSPAPER OR WATCHING TELEVISION: NOT AT ALL
10. IF YOU CHECKED OFF ANY PROBLEMS, HOW DIFFICULT HAVE THESE PROBLEMS MADE IT FOR YOU TO DO YOUR WORK, TAKE CARE OF THINGS AT HOME, OR GET ALONG WITH OTHER PEOPLE: VERY DIFFICULT
6. FEELING BAD ABOUT YOURSELF - OR THAT YOU ARE A FAILURE OR HAVE LET YOURSELF OR YOUR FAMILY DOWN: MORE THAN HALF THE DAYS
5. POOR APPETITE OR OVEREATING: SEVERAL DAYS
SUM OF ALL RESPONSES TO PHQ QUESTIONS 1-9: 15
2. FEELING DOWN, DEPRESSED OR HOPELESS: MORE THAN HALF THE DAYS
SUM OF ALL RESPONSES TO PHQ QUESTIONS 1-9: 15

## 2025-07-09 ASSESSMENT — ENCOUNTER SYMPTOMS
SHORTNESS OF BREATH: 0
ABDOMINAL PAIN: 0

## 2025-07-09 NOTE — PROGRESS NOTES
Shilpa Garcia is a 70 y.o. female who was seen in clinic today (7/9/2025).      Assessment & Plan:   Below is the assessment and plan developed based on review of pertinent history, physical exam, labs, studies, and medications.  1. Osteopenia of multiple sites  Assessment & Plan:   High FRAX and started on fosamax 2021 and tolerating well. Reviewed last bone density.  She is overdue for bone density and reviewed with her importance of getting this testing to monitor the benefits of her Fosamax and to assist with treatment decisions.  She reports she will get her bone density done.  Continue with Ca vit D3 and weight bearing exercises for her bone health.   2. Essential hypertension  Assessment & Plan:   Borderline.  Continue with current medication.  Her valsartan was recently increased by her nephrologist.  Continue to work on lifestyle changes.  3. Moderate episode of recurrent major depressive disorder (HCC)  Assessment & Plan:   Chronic, not at goal,   She did do 2 counseling sessions with Mikayla Gaspar but did not feel that it was helpful and did not want to continue with these.  She reports much of her depression is related to loss of her parents.  She does have some friends support.  we reviewed referral to psychiatry for further evaluation and medication management to see if we can get better control of her symptoms and she is agreeable to this plan.  She is given a handout with a list of psychiatrist to contact to make an appointment.  For now continue with current medications Lexapro and Wellbutrin. She is agreeable to this plan.  She does agree to contact me or go to the emergency room immediately if she develops any suicidal thoughts or plans to harm herself..  4. Type 2 diabetes mellitus with stage 3a chronic kidney disease, with long-term current use of insulin (HCC)  Assessment & Plan:   Not at goal and monitored by her endocrinologist.  Reviewed her last A1c with her.   We reviewed importance of

## 2025-07-09 NOTE — ASSESSMENT & PLAN NOTE
Borderline.  Continue with current medication.  Her valsartan was recently increased by her nephrologist.  Continue to work on lifestyle changes.

## 2025-07-09 NOTE — ASSESSMENT & PLAN NOTE
At goal, continue current medications reviewed recent 2025 lab work from her endocrinologist.  Will scan to chart.

## 2025-07-09 NOTE — ASSESSMENT & PLAN NOTE
Chronic, not at goal,   She did do 2 counseling sessions with Mikayla Gaspar but did not feel that it was helpful and did not want to continue with these.  She reports much of her depression is related to loss of her parents.  She does have some friends support.  we reviewed referral to psychiatry for further evaluation and medication management to see if we can get better control of her symptoms and she is agreeable to this plan.  She is given a handout with a list of psychiatrist to contact to make an appointment.  For now continue with current medications Lexapro and Wellbutrin. She is agreeable to this plan.  She does agree to contact me or go to the emergency room immediately if she develops any suicidal thoughts or plans to harm herself..

## 2025-07-09 NOTE — ASSESSMENT & PLAN NOTE
Not at goal and monitored by her endocrinologist.  Reviewed her last A1c with her.   We reviewed importance of medication and dietary compliance.  Her CKD is followed by nephrology.

## 2025-07-09 NOTE — ASSESSMENT & PLAN NOTE
High FRAX and started on fosamax 2021 and tolerating well. Reviewed last bone density.  She is overdue for bone density and reviewed with her importance of getting this testing to monitor the benefits of her Fosamax and to assist with treatment decisions.  She reports she will get her bone density done.  Continue with Ca vit D3 and weight bearing exercises for her bone health.

## 2025-08-21 ENCOUNTER — HOSPITAL ENCOUNTER (OUTPATIENT)
Age: 70
Discharge: HOME | End: 2025-08-24
Payer: MEDICARE

## 2025-08-21 VITALS — HEIGHT: 64 IN | WEIGHT: 200 LBS | BODY MASS INDEX: 34.15 KG/M2

## 2025-08-21 DIAGNOSIS — Z12.31 VISIT FOR SCREENING MAMMOGRAM: ICD-10-CM

## 2025-08-21 DIAGNOSIS — Z78.0 POSTMENOPAUSAL: ICD-10-CM

## 2025-08-21 PROCEDURE — 77067 SCR MAMMO BI INCL CAD: CPT

## 2025-08-21 PROCEDURE — 77080 DXA BONE DENSITY AXIAL: CPT

## 2025-08-25 RX ORDER — OMEPRAZOLE 20 MG/1
CAPSULE, DELAYED RELEASE ORAL DAILY
Qty: 30 CAPSULE | Refills: 0 | Status: SHIPPED | OUTPATIENT
Start: 2025-08-25

## (undated) DEVICE — YANKAUER,TAPERED BULBOUS TIP,W/O VENT: Brand: MEDLINE

## (undated) DEVICE — NEEDLE SPNL L4.75IN OD25GA QNCKE TYP SPINOCAN

## (undated) DEVICE — SYR 10ML LUER LOK 1/5ML GRAD --

## (undated) DEVICE — INFECTION CONTROL KIT SYS

## (undated) DEVICE — POLYLINED TOWEL: Brand: CONVERTORS

## (undated) DEVICE — BASIN EMSIS 16OZ GRAPHITE PLAS KID SHP MOLD GRAD FOR ORAL

## (undated) DEVICE — SOLUTION IV 1000ML 0.9% SOD CHL

## (undated) DEVICE — NEEDLE HYPO 25GA L1.5IN BVL ORIENTED ECLIPSE

## (undated) DEVICE — CATH IV AUTOGRD BC PNK 20GA 25 -- INSYTE

## (undated) DEVICE — ELECTRODE,RADIOTRANSLUCENT,FOAM,5PK: Brand: MEDLINE

## (undated) DEVICE — (D)PREP SKN CHLRAPRP APPL 26ML -- CONVERT TO ITEM 371833

## (undated) DEVICE — TRAP,MUCUS SPECIMEN, 80CC: Brand: MEDLINE

## (undated) DEVICE — 1200 GUARD II KIT W/5MM TUBE W/O VAC TUBE: Brand: GUARDIAN

## (undated) DEVICE — Device

## (undated) DEVICE — NEONATAL-ADULT SPO2 SENSOR: Brand: NELLCOR

## (undated) DEVICE — SET ADMIN 16ML TBNG L100IN 2 Y INJ SITE IV PIGGY BK DISP

## (undated) DEVICE — SYR 5ML 1/5 GRAD LL NSAF LF --

## (undated) DEVICE — ADULT SPO2 SENSOR: Brand: NELLCOR

## (undated) DEVICE — CONTAINER SPEC 20 ML LID NEUT BUFF FORMALIN 10 % POLYPR STS

## (undated) DEVICE — Z DISCONTINUED GLOVE SURG SZ 7.5 L12IN FNGR THK13MIL WHT ISOLEX

## (undated) DEVICE — SOLIDIFIER FLD 2OZ 1500CC N DISINF IN BTL DISP SAFESORB

## (undated) DEVICE — TOWEL 4 PLY TISS 19X30 SUE WHT

## (undated) DEVICE — SUTURE ETHLN SZ 4-0 L18IN NONABSORBABLE BLK L16MM PC-3 3/8 1864G

## (undated) DEVICE — DRAPE,REIN 53X77,STERILE: Brand: MEDLINE

## (undated) DEVICE — CATH IV AUTOGRD BC BLU 22GA 25 -- INSYTE

## (undated) DEVICE — NEEDLE HYPO 18GA L1.5IN PNK S STL HUB POLYPR SHLD REG BVL

## (undated) DEVICE — Z DISCONTINUED PER MEDLINE LINE GAS SAMPLING O2/CO2 LNG AD 13 FT NSL W/ TBNG FILTERLINE

## (undated) DEVICE — BLOCK BITE ENDOSCP AD 21 MM W/ DIL BLU LF DISP

## (undated) DEVICE — BAG SPEC BIOHZRD 10 X 10 IN --

## (undated) DEVICE — BIPOLAR FORCEPS CORD: Brand: VALLEYLAB

## (undated) DEVICE — STRAINER URIN CALC RNL MSH -- CONVERT TO ITEM 357634

## (undated) DEVICE — PADDING CST 4IN STERILE --

## (undated) DEVICE — BANDAGE COMPR 9 FTX4 IN SMOOTH COMFORTABLE SYNTH ESMRK LF

## (undated) DEVICE — BANDAGE COMPR SELF ADH 5 YDX2 IN TAN NS PREMIERPRO LTX

## (undated) DEVICE — SYR 3ML LL TIP 1/10ML GRAD --

## (undated) DEVICE — SNARE ENDOSCP M L240CM W27MM SHTH DIA2.4MM CHN 2.8MM OVL

## (undated) DEVICE — OCCLUSIVE GAUZE STRIP,3% BISMUTH TRIBROMOPHENATE IN PETROLATUM BLEND: Brand: XEROFORM

## (undated) DEVICE — SET IV EXTN L7IN 05ML PRIMING STD BOR MAXZERO CONN PINCH

## (undated) DEVICE — GAUZE SPONGES,12 PLY: Brand: CURITY

## (undated) DEVICE — STERILE POLYISOPRENE POWDER-FREE SURGICAL GLOVES: Brand: PROTEXIS

## (undated) DEVICE — FORCEPS BX L160CM DIA8MM GRSP DISECT CUP TIP NONLOCKING ROT

## (undated) DEVICE — NON-REM POLYHESIVE PATIENT RETURN ELECTRODE: Brand: VALLEYLAB

## (undated) DEVICE — DISPOSABLE TOURNIQUET CUFF SINGLE BLADDER, DUAL PORT AND QUICK CONNECT CONNECTOR: Brand: COLOR CUFF

## (undated) DEVICE — SKIN MARKER,REGULAR TIP WITH RULER AND LABELS: Brand: DEVON

## (undated) DEVICE — SOLIDIFIER MEDC 1200ML -- CONVERT TO 356117

## (undated) DEVICE — TOWEL SURG W17XL27IN STD BLU COT NONFENESTRATED PREWASHED

## (undated) DEVICE — DRAPE,HAND,STERILE: Brand: MEDLINE